# Patient Record
Sex: FEMALE | Race: WHITE | NOT HISPANIC OR LATINO | Employment: FULL TIME | URBAN - METROPOLITAN AREA
[De-identification: names, ages, dates, MRNs, and addresses within clinical notes are randomized per-mention and may not be internally consistent; named-entity substitution may affect disease eponyms.]

---

## 2017-10-31 ENCOUNTER — OFFICE VISIT (OUTPATIENT)
Dept: FAMILY MEDICINE | Facility: CLINIC | Age: 57
End: 2017-10-31
Payer: COMMERCIAL

## 2017-10-31 VITALS
HEIGHT: 67 IN | TEMPERATURE: 98.1 F | SYSTOLIC BLOOD PRESSURE: 104 MMHG | HEART RATE: 84 BPM | DIASTOLIC BLOOD PRESSURE: 70 MMHG | BODY MASS INDEX: 24.28 KG/M2 | OXYGEN SATURATION: 97 % | WEIGHT: 154.7 LBS

## 2017-10-31 DIAGNOSIS — R31.9 HEMATURIA, UNSPECIFIED TYPE: ICD-10-CM

## 2017-10-31 DIAGNOSIS — R39.9 SYMPTOMS INVOLVING URINARY SYSTEM: ICD-10-CM

## 2017-10-31 DIAGNOSIS — R10.9 FLANK PAIN: Primary | ICD-10-CM

## 2017-10-31 LAB
ALBUMIN UR-MCNC: NEGATIVE MG/DL
APPEARANCE UR: ABNORMAL
BACTERIA #/AREA URNS HPF: ABNORMAL /HPF
BILIRUB UR QL STRIP: NEGATIVE
COLOR UR AUTO: YELLOW
GLUCOSE UR STRIP-MCNC: NEGATIVE MG/DL
HGB UR QL STRIP: ABNORMAL
KETONES UR STRIP-MCNC: NEGATIVE MG/DL
LEUKOCYTE ESTERASE UR QL STRIP: NEGATIVE
NITRATE UR QL: NEGATIVE
NON-SQ EPI CELLS #/AREA URNS LPF: ABNORMAL /LPF
PH UR STRIP: 5.5 PH (ref 5–7)
RBC #/AREA URNS AUTO: ABNORMAL /HPF
SOURCE: ABNORMAL
SP GR UR STRIP: 1.02 (ref 1–1.03)
UROBILINOGEN UR STRIP-ACNC: 0.2 EU/DL (ref 0.2–1)
WBC #/AREA URNS AUTO: ABNORMAL /HPF

## 2017-10-31 PROCEDURE — 81001 URINALYSIS AUTO W/SCOPE: CPT | Performed by: PHYSICIAN ASSISTANT

## 2017-10-31 PROCEDURE — 99214 OFFICE O/P EST MOD 30 MIN: CPT | Performed by: PHYSICIAN ASSISTANT

## 2017-10-31 RX ORDER — TAMSULOSIN HYDROCHLORIDE 0.4 MG/1
0.4 CAPSULE ORAL DAILY
Qty: 30 CAPSULE | Refills: 0 | Status: SHIPPED | OUTPATIENT
Start: 2017-10-31 | End: 2018-09-05

## 2017-10-31 NOTE — NURSING NOTE
"Chief Complaint   Patient presents with     UTI       Initial /70 (BP Location: Right arm, Cuff Size: Adult Regular)  Pulse 84  Temp 98.1  F (36.7  C) (Oral)  Ht 5' 7\" (1.702 m)  Wt 154 lb 11.2 oz (70.2 kg)  SpO2 97%  BMI 24.23 kg/m2 Estimated body mass index is 24.23 kg/(m^2) as calculated from the following:    Height as of this encounter: 5' 7\" (1.702 m).    Weight as of this encounter: 154 lb 11.2 oz (70.2 kg).  Medication Reconciliation: complete   Aris Noel CMA      "

## 2017-10-31 NOTE — PROGRESS NOTES
SUBJECTIVE:   Joanne Padilla is a 57 year old female who presents to clinic today for the following health issues:    URINARY TRACT SYMPTOMS      Duration: 4 days    Description  frequency, urgency, back pain and kidney stone    Intensity:  moderate    Accompanying signs and symptoms:  Fever/chills: YES  Flank pain no   Nausea and vomiting: no   Vaginal symptoms: irritated   Abdominal/Pelvic Pain: no     History  History of frequent UTI's: no   History of kidney stones: YES  Sexually Active: no   Possibility of pregnancy: No    Precipitating or alleviating factors: None    Therapies tried and outcome: course of antibiotics - Zpack, increase fluid intake and ibuprofen   Outcome: ineffective         -Patient is a 58yo female with new onset urinary symptoms  -This follows a sinus infection treated with azithromycin  -On Friday she developed increased urinary frequency  -she denies any gross blood in the urine  -Some lower back pain, left sided, feeling worn down  -she denies any fever  -there is no vaginal irritation, or discharge  -not drinking enough fluids      Problem list and histories reviewed & adjusted, as indicated.  Additional history: as documented    Patient Active Problem List   Diagnosis     Tobacco abuse     Chronic rhinitis     Atopic dermatitis     Migraines     Past Surgical History:   Procedure Laterality Date      SECTION       COLONOSCOPY       DAVINCI LAPAROSCOPIC CHOLECYSTECTOMY WITHOUT GRAMS  2013    Procedure: DAVINCI LAPAROSCOPIC CHOLECYSTECTOMY WITHOUT GRAMS;  DAVINCI CHOLECYSTECTOMY;  Surgeon: Jac Stallings MD;  Location: [facility]     GYN SURGERY      repair of fallopian tubes and ovaries     LUMPECTOMY BREAST       LUMPECTOMY BREAST       NOSE SURGERY         Social History   Substance Use Topics     Smoking status: Current Every Day Smoker     Packs/day: 0.50     Years: 15.00     Types: Cigarettes     Smokeless tobacco: Never Used      Comment: Has not been smoking  "lately but has not quit, [place] quit plan -13      Alcohol use No     Family History   Problem Relation Age of Onset     C.A.D. Mother      MI early 50s;  59 heart problems     C.A.D. Father              Reviewed and updated as needed this visit by clinical staffTobacco  Allergies  Med Hx  Surg Hx  Fam Hx  Soc Hx      Reviewed and updated as needed this visit by Provider         ROS:  Constitutional, HEENT, cardiovascular, pulmonary, gi and gu systems are negative, except as otherwise noted.      OBJECTIVE:   /70 (BP Location: Right arm, Cuff Size: Adult Regular)  Pulse 84  Temp 98.1  F (36.7  C) (Oral)  Ht 5' 7\" (1.702 m)  Wt 154 lb 11.2 oz (70.2 kg)  SpO2 97%  BMI 24.23 kg/m2  Body mass index is 24.23 kg/(m^2).  GENERAL: healthy, alert and no distress  RESP: lungs clear to auscultation - no rales, rhonchi or wheezes  CV: regular rates and rhythm  ABDOMEN: soft, nontender, no hepatosplenomegaly, no masses and bowel sounds normal  BACK: mild CVA tenderness, no paralumbar tenderness    Diagnostic Test Results:  Results for orders placed or performed in visit on 10/31/17 (from the past 24 hour(s))   *UA reflex to Microscopic and Culture ([place] and [facility] (except [place] and [place])   Result Value Ref Range    Color Urine Yellow     Appearance Urine Slightly Cloudy     Glucose Urine Negative NEG^Negative mg/dL    Bilirubin Urine Negative NEG^Negative    Ketones Urine Negative NEG^Negative mg/dL    Specific Gravity Urine 1.020 1.003 - 1.035    Blood Urine Moderate (A) NEG^Negative    pH Urine 5.5 5.0 - 7.0 pH    Protein Albumin Urine Negative NEG^Negative mg/dL    Urobilinogen Urine 0.2 0.2 - 1.0 EU/dL    Nitrite Urine Negative NEG^Negative    Leukocyte Esterase Urine Negative NEG^Negative    Source Midstream Urine    Urine Microscopic   Result Value Ref Range    WBC Urine O - 2 OTO2^O - 2 /HPF    RBC Urine 10-25 (A) OTO2^O - 2 /HPF    Squamous Epithelial /LPF Urine Moderate (A) " FEW^Few /LPF    Bacteria Urine Many (A) NEG^Negative /HPF       ASSESSMENT/PLAN:   1. Flank pain  2. Hematuria, unspecified type  3. Symptoms involving urinary system  With hx of stone and urine today, along with exam, suspect stone. She admits that she does not drink much in the way of fluids. We did review other possible etiologies to her symptoms and she'll monitor for these. She is otherwise stable. We'll have her start flomax, push fluids and begin straining her urine. If symptoms worsen or dont improve over the next few days we've arranged for CT scan to further investigate.   - tamsulosin (FLOMAX) 0.4 MG capsule; Take 1 capsule (0.4 mg) by mouth daily  Dispense: 30 capsule; Refill: 0  - CT Abdomen Pelvis w/o & w Contrast; Future  - Stone analysis; Future  - *UA reflex to Microscopic and Culture ([place] and [facility] (except [place] and [place])  - Urine Microscopic      Fermín Larios PA-C  [facility]

## 2017-10-31 NOTE — MR AVS SNAPSHOT
"              After Visit Summary   10/31/2017    Joanne Padilla    MRN: [id number]           Patient Information     Date Of Birth          1960        Visit Information        Provider Department      10/31/2017 6:20 PM Fermín Larios PA-C [facility]        Today's Diagnoses     Flank pain    -  1    Hematuria, unspecified type        Symptoms involving urinary system           Follow-ups after your visit        Future tests that were ordered for you today     Open Future Orders        Priority Expected Expires Ordered    CT Abdomen Pelvis w/o & w Contrast Routine  10/31/2018 10/31/2017    Stone analysis Routine  10/31/2018 10/31/2017            Who to contact     If you have questions or need follow up information about today's clinic visit or your schedule please contact [facility] directly at [phone].  Normal or non-critical lab and imaging results will be communicated to you by MyChart, letter or phone within 4 business days after the clinic has received the results. If you do not hear from us within 7 days, please contact the clinic through MyChart or phone. If you have a critical or abnormal lab result, we will notify you by phone as soon as possible.  Submit refill requests through [employer] or call your pharmacy and they will forward the refill request to us. Please allow 3 business days for your refill to be completed.          Additional Information About Your Visit        MyChart Information     [employer] lets you send messages to your doctor, view your test results, renew your prescriptions, schedule appointments and more. To sign up, go to [email or web address] . Click on \"Log in\" on the left side of the screen, which will take you to the Welcome page. Then click on \"Sign up Now\" on the right side of the page.     You will be asked to enter the access code listed below, as well as some personal information. Please follow the directions to create " "your username and password.     Your access code is: [email or web address]  Expires: 2018  7:07 PM     Your access code will  in 90 days. If you need help or a new code, please call your [place] clinic or [phone].        Care EveryWhere ID     This is your Care EveryWhere ID. This could be used by other organizations to access your [place] medical records  [id number]        Your Vitals Were     Pulse Temperature Height Pulse Oximetry BMI (Body Mass Index)       84 98.1  F (36.7  C) (Oral) 5' 7\" (1.702 m) 97% 24.23 kg/m2        Blood Pressure from Last 3 Encounters:   10/31/17 104/70   13 104/62   13 100/60    Weight from Last 3 Encounters:   10/31/17 154 lb 11.2 oz (70.2 kg)   13 164 lb 7.4 oz (74.6 kg)   13 165 lb 9.1 oz (75.1 kg)              We Performed the Following     *UA reflex to Microscopic and Culture ([place] and [facility] (except [place] and [place])     Urine Microscopic          Today's Medication Changes          These changes are accurate as of: 10/31/17  7:07 PM.  If you have any questions, ask your nurse or doctor.               Start taking these medicines.        Dose/Directions    tamsulosin 0.4 MG capsule   Commonly known as:  FLOMAX   Used for:  Flank pain, Hematuria, unspecified type   Started by:  Fermín Larios PA-C        Dose:  0.4 mg   Take 1 capsule (0.4 mg) by mouth daily   Quantity:  30 capsule   Refills:  0            Where to get your medicines      [facility] [address] [facility] [address] [address] [facility] [address] [facility]  [address] [address] [facility] [address]     Phone:  [phone]     tamsulosin 0.4 MG capsule                Primary Care Provider Office Phone # Fax #    Lance Alcocer [phone]       [facility] [address]  [address]  [facility] [address]        Equal Access to Services     KHADIJAH DIAZ AH: Veena Marcus, " wahalleilani cazaresmarcialha, qaybta kanirmal marsh, isaac hernandezdhruv ah. So [facility] [phone].    ATENCIÓN: Si darrius hagan, tiene a gonzales disposición servicios gratuitos de asistencia lingüística. Modeame al [phone].    We comply with applicable federal civil rights laws and [place] laws. We do not discriminate on the basis of race, color, national origin, age, disability, sex, sexual orientation, or gender identity.            Thank you!     Thank you for choosing [facility]  for your care. Our goal is always to provide you with excellent care. Hearing back from our patients is one way we can continue to improve our services. Please take a few minutes to complete the written survey that you may receive in the mail after your visit with us. Thank you!             Your Updated Medication List - Protect others around you: Learn how to safely use, store and throw away your medicines at www.disposemymeds.org.          This list is accurate as of: 10/31/17  7:07 PM.  Always use your most recent med list.                   Brand Name Dispense Instructions for use Diagnosis    ORTHO TRI-CYCLEN LO PO      Take 1 tablet by mouth daily        tamsulosin 0.4 MG capsule    FLOMAX    30 capsule    Take 1 capsule (0.4 mg) by mouth daily    Flank pain, Hematuria, unspecified type       vitamin D 1000 UNITS capsule      Take 1 capsule by mouth daily

## 2018-01-04 ENCOUNTER — TELEPHONE (OUTPATIENT)
Dept: FAMILY MEDICINE | Facility: CLINIC | Age: 58
End: 2018-01-04

## 2018-01-04 NOTE — TELEPHONE ENCOUNTER
Panel Management Review      Patient has the following on her problem list: None      Composite cancer screening  Chart review shows that this patient is due/due soon for the following Pap Smear, Mammogram and Colonoscopy  Summary:    Patient is due/failing the following:   COLONOSCOPY, MAMMOGRAM, PAP and PHYSICAL    Action needed:   Patient needs office visit for physical and pap. Also due for a mammogram and a colonoscopy.    Type of outreach:    Phone, spoke to patient.  Patient stated that she would like to establish care here at [place], and will call back to schedule her physical.     Questions for provider review:    None                                                                                                                                    Cari Dias MA     Chart Closed.

## 2018-05-07 ENCOUNTER — TELEPHONE (OUTPATIENT)
Dept: FAMILY MEDICINE | Facility: CLINIC | Age: 58
End: 2018-05-07

## 2018-05-07 NOTE — TELEPHONE ENCOUNTER
Type of outreach:  Phone, spoke to patient.  Patient stated that she will try to get a physical scheduled.   Health Maintenance Due   Topic Date Due     PAP Q1 YR  06/20/1975     HIV SCREEN (SYSTEM ASSIGNED)  06/20/1980     HEPATITIS C SCREENING  06/20/1980     LIPID SCREEN Q5 YR FEMALE (SYSTEM ASSIGNED)  06/20/2007     MAMMO SCREEN Q2 YR (SYSTEM ASSIGNED)  06/20/2012     COLON CANCER SCREEN (SYSTEM ASSIGNED)  06/20/2012     TETANUS IMMUNIZATION (SYSTEM ASSIGNED)  01/01/2017     ADVANCE DIRECTIVE PLANNING Q5 YRS  06/20/2017     Due for physical and pap, also due for colon screening and mammogram.   Cari Dias MA

## 2018-09-05 ENCOUNTER — OFFICE VISIT (OUTPATIENT)
Dept: FAMILY MEDICINE | Facility: CLINIC | Age: 58
End: 2018-09-05
Payer: COMMERCIAL

## 2018-09-05 VITALS
HEART RATE: 87 BPM | SYSTOLIC BLOOD PRESSURE: 100 MMHG | RESPIRATION RATE: 17 BRPM | OXYGEN SATURATION: 99 % | WEIGHT: 171.1 LBS | TEMPERATURE: 99 F | DIASTOLIC BLOOD PRESSURE: 72 MMHG | BODY MASS INDEX: 26.8 KG/M2

## 2018-09-05 DIAGNOSIS — Z83.49 FAMILY HISTORY OF THYROID DISEASE: ICD-10-CM

## 2018-09-05 DIAGNOSIS — F43.9 STRESS: ICD-10-CM

## 2018-09-05 DIAGNOSIS — L30.9 DERMATITIS: Primary | ICD-10-CM

## 2018-09-05 PROCEDURE — 99214 OFFICE O/P EST MOD 30 MIN: CPT | Performed by: INTERNAL MEDICINE

## 2018-09-05 RX ORDER — DEXTROAMPHETAMINE SACCHARATE, AMPHETAMINE ASPARTATE, DEXTROAMPHETAMINE SULFATE AND AMPHETAMINE SULFATE 5; 5; 5; 5 MG/1; MG/1; MG/1; MG/1
20 TABLET ORAL DAILY
COMMUNITY
Start: 2018-08-29 | End: 2019-04-12

## 2018-09-05 RX ORDER — NORGESTIMATE AND ETHINYL ESTRADIOL 7DAYSX3 28
1 KIT ORAL DAILY
COMMUNITY
Start: 2018-05-16 | End: 2019-01-31

## 2018-09-05 RX ORDER — DOXYCYCLINE 100 MG/1
100 TABLET ORAL 2 TIMES DAILY
Qty: 28 TABLET | Refills: 0 | Status: SHIPPED | OUTPATIENT
Start: 2018-09-05 | End: 2019-01-31

## 2018-09-05 RX ORDER — PERMETHRIN 50 MG/G
CREAM TOPICAL
Qty: 60 G | Refills: 1 | Status: SHIPPED | OUTPATIENT
Start: 2018-09-05 | End: 2019-01-31

## 2018-09-05 RX ORDER — DEXTROAMPHETAMINE SACCHARATE, AMPHETAMINE ASPARTATE MONOHYDRATE, DEXTROAMPHETAMINE SULFATE AND AMPHETAMINE SULFATE 5; 5; 5; 5 MG/1; MG/1; MG/1; MG/1
20 CAPSULE, EXTENDED RELEASE ORAL DAILY
COMMUNITY
Start: 2018-07-06 | End: 2019-01-31

## 2018-09-05 NOTE — MR AVS SNAPSHOT
"              After Visit Summary   9/5/2018    Joanne Padilla    MRN: [id number]           Patient Information     Date Of Birth          1960        Visit Information        Provider Department      9/5/2018 10:00 AM Jody Trujillo MD [facility]        Today's Diagnoses     Dermatitis    -  1    Family history of thyroid disease        Stress           Follow-ups after your visit        Future tests that were ordered for you today     Open Future Orders        Priority Expected Expires Ordered    CBC with platelets differential Routine  10/5/2018 9/5/2018    TSH with free T4 reflex Routine  10/5/2018 9/5/2018    Comprehensive metabolic panel Routine  10/5/2018 9/5/2018    Cortisol Routine  10/5/2018 9/5/2018            Who to contact     If you have questions or need follow up information about today's clinic visit or your schedule please contact [facility] directly at [phone].  Normal or non-critical lab and imaging results will be communicated to you by MyChart, letter or phone within 4 business days after the clinic has received the results. If you do not hear from us within 7 days, please contact the clinic through [employer] or phone. If you have a critical or abnormal lab result, we will notify you by phone as soon as possible.  Submit refill requests through [employer] or call your pharmacy and they will forward the refill request to us. Please allow 3 business days for your refill to be completed.          Additional Information About Your Visit        MyChart Information     [employer] lets you send messages to your doctor, view your test results, renew your prescriptions, schedule appointments and more. To sign up, go to [email or web address] . Click on \"Log in\" on the left side of the screen, which will take you to the Welcome page. Then click on \"Sign up Now\" on the right side of the page.     You will be asked to enter the access code listed below, as " well as some personal information. Please follow the directions to create your username and password.     Your access code is: [email or web address]  Expires: 2018 10:47 AM     Your access code will  in 90 days. If you need help or a new code, please call your [place] clinic or [phone].        Care EveryWhere ID     This is your Care EveryWhere ID. This could be used by other organizations to access your [place] medical records  [id number]        Your Vitals Were     Pulse Temperature Respirations Last Period Pulse Oximetry BMI (Body Mass Index)    87 99  F (37.2  C) (Oral) 17 2018 (Exact Date) 99% 26.8 kg/m2       Blood Pressure from Last 3 Encounters:   18 100/72   10/31/17 104/70   13 104/62    Weight from Last 3 Encounters:   18 171 lb 1.6 oz (77.6 kg)   10/31/17 154 lb 11.2 oz (70.2 kg)   13 164 lb 7.4 oz (74.6 kg)                 Today's Medication Changes          These changes are accurate as of 18 10:47 AM.  If you have any questions, ask your nurse or doctor.               Start taking these medicines.        Dose/Directions    doxycycline Monohydrate 100 MG Tabs   Used for:  Dermatitis   Started by:  Jody Trujillo MD        Dose:  100 mg   Take 100 mg by mouth 2 times daily for 14 days   Quantity:  28 tablet   Refills:  0       permethrin 5 % cream   Commonly known as:  ELIMITE   Used for:  Dermatitis   Started by:  Jody Trujillo MD        Apply cream from head to toe (except the face); leave on for 8-14 hours then wash off with water; reapply in 1 week if live mites appear.   Quantity:  60 g   Refills:  1            Where to get your medicines      These medications were [facility] to [facility] [address]  [place] [address] [address] [address] [facility] [address] [facility]  [address] [place] [address]     Phone:  [phone]     doxycycline Monohydrate 100 MG Tabs    permethrin 5 % cream                Primary Care Provider  Office Phone # Fax #    Fermín Larios PA-C [phone]       [address] [address] [address]        Equal Access to Services     KHADIJAH DIAZ : Hadii peace ku hadambero Somangoali, waaxda luqadaha, qaybta kaalmada adeegyada, isaac bolaños laDianneverónica rucker. So [facility] [phone].    ATENCIÓN: Si habla español, tiene a gonzales disposición servicios gratuitos de asistencia lingüística. Llame al [phone].    We comply with applicable federal civil rights laws and [place] laws. We do not discriminate on the basis of race, color, national origin, age, disability, sex, sexual orientation, or gender identity.            Thank you!     Thank you for choosing [facility]  for your care. Our goal is always to provide you with excellent care. Hearing back from our patients is one way we can continue to improve our services. Please take a few minutes to complete the written survey that you may receive in the mail after your visit with us. Thank you!             Your Updated Medication List - Protect others around you: Learn how to safely use, store and throw away your medicines at www.disposemymeds.org.          This list is accurate as of 9/5/18 10:47 AM.  Always use your most recent med list.                   Brand Name Dispense Instructions for use Diagnosis    * ADDERALL XR 20 MG per 24 hr capsule   Generic drug:  amphetamine-dextroamphetamine      Take 20 mg by mouth daily        * amphetamine-dextroamphetamine 20 MG per tablet    ADDERALL     Take 20 mg by mouth daily        doxycycline Monohydrate 100 MG Tabs     28 tablet    Take 100 mg by mouth 2 times daily for 14 days    Dermatitis       permethrin 5 % cream    ELIMITE    60 g    Apply cream from head to toe (except the face); leave on for 8-14 hours then wash off with water; reapply in 1 week if live mites appear.    Dermatitis       TRINESSA (28) 0.18/0.215/0.25 MG-35 MCG per tablet   Generic drug:  norgestim-eth  estrad triphasic      Take 1 tablet by mouth daily        * Notice:  This list has 2 medication(s) that are the same as other medications prescribed for you. Read the directions carefully, and ask your doctor or other care provider to review them with you.

## 2018-09-05 NOTE — PROGRESS NOTES
SUBJECTIVE:   Joanne Padilla is a 58 year old female who presents to clinic today for the following health issues:      Rash, bumps and itching    Patient presents with:  Derm Problem: itching and has small sores all over her ankles and on other areas of her body.   itching is all over her body and gets small lumps that she scratches.   symptoms since            Duration: has been ongoing since     Description  Location: all over her body   Sores on her ankles and does scratch areas  Itching: severe    Intensity:  moderate    Accompanying signs and symptoms: as noted    History (similar episodes/previous evaluation): intermittent over several years ago, but this is much worse than she has ever experienced.     Precipitating or alleviating factors:  New exposures:  None  Recent travel: no      Therapies tried and outcome: hydrocortisone cream -  not effective and calamine lotion      Problem list and histories reviewed & adjusted, as indicated.  Additional history: as documented    Patient Active Problem List   Diagnosis     Tobacco abuse     Chronic rhinitis     Atopic dermatitis     Migraines     Past Surgical History:   Procedure Laterality Date      SECTION       COLONOSCOPY       DAVINCI LAPAROSCOPIC CHOLECYSTECTOMY WITHOUT GRAMS  2013    Procedure: DAVINCI LAPAROSCOPIC CHOLECYSTECTOMY WITHOUT GRAMS;  DAVINCI CHOLECYSTECTOMY;  Surgeon: Jac Stallings MD;  Location: [facility]     GYN SURGERY      repair of fallopian tubes and ovaries     LUMPECTOMY BREAST       LUMPECTOMY BREAST       NOSE SURGERY         Social History   Substance Use Topics     Smoking status: Current Every Day Smoker     Packs/day: 0.50     Years: 15.00     Types: Cigarettes     Smokeless tobacco: Never Used      Comment: Has not been smoking lately but has not quit, [place] -13      Alcohol use No     Family History   Problem Relation Age of Onset     C.A.D. Mother      MI early 50s;  59 heart problems      C.A.D. Father          Current Outpatient Prescriptions   Medication Sig Dispense Refill     amphetamine-dextroamphetamine (ADDERALL XR) 20 MG per 24 hr capsule Take 20 mg by mouth daily       amphetamine-dextroamphetamine (ADDERALL) 20 MG per tablet Take 20 mg by mouth daily       norgestim-eth estrad triphasic (TRINESSA, 28,) 0.18/0.215/0.25 MG-35 MCG per tablet Take 1 tablet by mouth daily       permethrin (ELIMITE) 5 % cream Apply cream from head to toe (except the face); leave on for 8-14 hours then wash off with water; reapply in 1 week if live mites appear. 60 g 1     Allergies   Allergen Reactions     Codeine Nausea     Latex      BP Readings from Last 3 Encounters:   09/05/18 100/72   10/31/17 104/70   09/24/13 104/62    Wt Readings from Last 3 Encounters:   09/05/18 171 lb 1.6 oz (77.6 kg)   10/31/17 154 lb 11.2 oz (70.2 kg)   09/24/13 164 lb 7.4 oz (74.6 kg)                  Labs reviewed in EPIC    Reviewed and updated as needed this visit by clinical staff  Tobacco  Allergies  Meds  Problems  Med Hx  Surg Hx  Fam Hx  Soc Hx        Reviewed and updated as needed this visit by Provider  Allergies  Meds  Problems         ROS:  CONSTITUTIONAL: NEGATIVE for fever, chills, change in weight  INTEGUMENTARY/SKIN: see above  RESP: NEGATIVE for significant cough or SOB  CV: NEGATIVE for chest pain, palpitations or peripheral edema  GI: NEGATIVE for nausea, abdominal pain, heartburn, or change in bowel habits  ENDOCRINE: family hx of thyroid disease  HEME/ALLERGY/IMMUNE: no hx of anemia or immune changes.  PSYCHIATRIC: NEGATIVE for changes in mood or affect    OBJECTIVE:     /72  Pulse 87  Temp 99  F (37.2  C) (Oral)  Resp 17  Wt 171 lb 1.6 oz (77.6 kg)  LMP 08/26/2018 (Exact Date)  SpO2 99%  BMI 26.8 kg/m2  Body mass index is 26.8 kg/(m^2).  GENERAL: healthy, alert and no distress  NECK: no adenopathy, no asymmetry, masses, or scars and thyroid normal to palpation  RESP: lungs clear to  auscultation - no rales, rhonchi or wheezes  CV: regular rates and rhythm, normal S1 S2, no S3 or S4, peripheral pulses strong and no peripheral edema  ABDOMEN: soft, nontender, no hepatosplenomegaly, no masses and bowel sounds normal  MS: no gross musculoskeletal defects noted, no edema  SKIN: currently, lower extremities with excoriations and secondary infection- around ankles an dlower extremities; ; also few on her trunk;  None in groin;  Hands/webs of fingers irritated dry skin without open areas.  NEURO: Normal strength and tone, mentation intact and speech normal  PSYCH: mentation appears normal, affect normal/bright      ASSESSMENT/PLAN:     (L30.9) Dermatitis  (primary encounter diagnosis)  Comment: possible scabies; no clear exposure; some with secondary infection.  Check with lab; unable to assess for scabies ( do not have necessary oil)  recommend antibiotics to treat secondary infection and topical for probable scabies; benefits outweigh risk.  Discussed course of treatment; she understands to application process for medications and to repeat in one week.   Plan: CBC with platelets differential, Comprehensive         metabolic panel, permethrin (ELIMITE) 5 %         cream, doxycycline Monohydrate 100 MG TABS          (Z83.49) Family history of thyroid disease  Comment: FH reviewed; pt is concerns about dry, itchy skin; she is concerned about thyroid.   Plan: TSH with free T4 reflex         (F43.9) Stress  Comment: increased stress and anxiety; reviewed pt request for labs.   Plan: Comprehensive metabolic panel, Cortisol            Jody Trujillo MD  Internal Medicine   [facility]

## 2018-09-06 DIAGNOSIS — Z83.49 FAMILY HISTORY OF THYROID DISEASE: ICD-10-CM

## 2018-09-06 DIAGNOSIS — F43.9 STRESS: ICD-10-CM

## 2018-09-06 DIAGNOSIS — L30.9 DERMATITIS: ICD-10-CM

## 2018-09-06 LAB
BASOPHILS # BLD AUTO: 0 10E9/L (ref 0–0.2)
BASOPHILS NFR BLD AUTO: 0.4 %
CORTIS SERPL-MCNC: 13.6 UG/DL (ref 4–22)
DIFFERENTIAL METHOD BLD: NORMAL
EOSINOPHIL # BLD AUTO: 0.1 10E9/L (ref 0–0.7)
EOSINOPHIL NFR BLD AUTO: 1.3 %
ERYTHROCYTE [DISTWIDTH] IN BLOOD BY AUTOMATED COUNT: 13.7 % (ref 10–15)
HCT VFR BLD AUTO: 41.1 % (ref 35–47)
HGB BLD-MCNC: 13.5 G/DL (ref 11.7–15.7)
LYMPHOCYTES # BLD AUTO: 2.2 10E9/L (ref 0.8–5.3)
LYMPHOCYTES NFR BLD AUTO: 29.3 %
MCH RBC QN AUTO: 31.2 PG (ref 26.5–33)
MCHC RBC AUTO-ENTMCNC: 32.8 G/DL (ref 31.5–36.5)
MCV RBC AUTO: 95 FL (ref 78–100)
MONOCYTES # BLD AUTO: 0.4 10E9/L (ref 0–1.3)
MONOCYTES NFR BLD AUTO: 4.8 %
NEUTROPHILS # BLD AUTO: 4.8 10E9/L (ref 1.6–8.3)
NEUTROPHILS NFR BLD AUTO: 64.2 %
PLATELET # BLD AUTO: 344 10E9/L (ref 150–450)
RBC # BLD AUTO: 4.33 10E12/L (ref 3.8–5.2)
WBC # BLD AUTO: 7.5 10E9/L (ref 4–11)

## 2018-09-06 PROCEDURE — 84443 ASSAY THYROID STIM HORMONE: CPT | Performed by: INTERNAL MEDICINE

## 2018-09-06 PROCEDURE — 80053 COMPREHEN METABOLIC PANEL: CPT | Performed by: INTERNAL MEDICINE

## 2018-09-06 PROCEDURE — 85025 COMPLETE CBC W/AUTO DIFF WBC: CPT | Performed by: INTERNAL MEDICINE

## 2018-09-06 PROCEDURE — 36415 COLL VENOUS BLD VENIPUNCTURE: CPT | Performed by: INTERNAL MEDICINE

## 2018-09-06 PROCEDURE — 82533 TOTAL CORTISOL: CPT | Performed by: INTERNAL MEDICINE

## 2018-09-07 ENCOUNTER — TELEPHONE (OUTPATIENT)
Dept: FAMILY MEDICINE | Facility: CLINIC | Age: 58
End: 2018-09-07

## 2018-09-07 LAB
ALBUMIN SERPL-MCNC: 3.3 G/DL (ref 3.4–5)
ALP SERPL-CCNC: 58 U/L (ref 40–150)
ALT SERPL W P-5'-P-CCNC: 16 U/L (ref 0–50)
ANION GAP SERPL CALCULATED.3IONS-SCNC: 9 MMOL/L (ref 3–14)
AST SERPL W P-5'-P-CCNC: 12 U/L (ref 0–45)
BILIRUB SERPL-MCNC: 0.3 MG/DL (ref 0.2–1.3)
BUN SERPL-MCNC: 17 MG/DL (ref 7–30)
CALCIUM SERPL-MCNC: 8.1 MG/DL (ref 8.5–10.1)
CHLORIDE SERPL-SCNC: 108 MMOL/L (ref 94–109)
CO2 SERPL-SCNC: 23 MMOL/L (ref 20–32)
CREAT SERPL-MCNC: 0.77 MG/DL (ref 0.52–1.04)
GFR SERPL CREATININE-BSD FRML MDRD: 77 ML/MIN/1.7M2
GLUCOSE SERPL-MCNC: 105 MG/DL (ref 70–99)
POTASSIUM SERPL-SCNC: 4.4 MMOL/L (ref 3.4–5.3)
PROT SERPL-MCNC: 6.9 G/DL (ref 6.8–8.8)
SODIUM SERPL-SCNC: 140 MMOL/L (ref 133–144)
TSH SERPL DL<=0.005 MIU/L-ACNC: 2 MU/L (ref 0.4–4)

## 2018-09-07 NOTE — TELEPHONE ENCOUNTER
Type of outreach:  None  Health Maintenance Due   Topic Date Due     PHQ-2 Q1 YR  06/20/1974     PAP Q1 YR  06/20/1975     HIV SCREEN (SYSTEM ASSIGNED)  06/20/1980     HEPATITIS C SCREENING  06/20/1980     LIPID SCREEN Q5 YR FEMALE (SYSTEM ASSIGNED)  06/20/2007     MAMMO SCREEN Q2 YR (SYSTEM ASSIGNED)  06/20/2012     COLON CANCER SCREEN (SYSTEM ASSIGNED)  06/20/2012     TETANUS IMMUNIZATION (SYSTEM ASSIGNED)  01/01/2017     ADVANCE DIRECTIVE PLANNING Q5 YRS  06/20/2017     INFLUENZA VACCINE (1) 09/01/2018     Patient seen on 9/5/18 and  clinic, Health Maintenance should have been discussed at this visit.   Cari Dias MA

## 2018-09-08 ENCOUNTER — HEALTH MAINTENANCE LETTER (OUTPATIENT)
Age: 58
End: 2018-09-08

## 2018-10-19 ENCOUNTER — ALLIED HEALTH/NURSE VISIT (OUTPATIENT)
Dept: NURSING | Facility: CLINIC | Age: 58
End: 2018-10-19
Payer: COMMERCIAL

## 2018-10-19 DIAGNOSIS — Z23 NEED FOR PROPHYLACTIC VACCINATION AND INOCULATION AGAINST INFLUENZA: Primary | ICD-10-CM

## 2018-10-19 PROCEDURE — 90682 RIV4 VACC RECOMBINANT DNA IM: CPT

## 2018-10-19 PROCEDURE — 90471 IMMUNIZATION ADMIN: CPT

## 2018-10-19 PROCEDURE — 99207 ZZC NO CHARGE NURSE ONLY: CPT

## 2018-10-19 NOTE — MR AVS SNAPSHOT
After Visit Summary   10/19/2018    Joanne Padilla    MRN: [id number]           Patient Information     Date Of Birth          1960        Visit Information        Provider Department      10/19/2018 2:30 PM  NURSE Mai [facility]        Today's Diagnoses     Need for prophylactic vaccination and inoculation against influenza    -  1       Follow-ups after your visit        Who to contact     If you have questions or need follow up information about today's clinic visit or your schedule please contact [facility] directly at [phone].  Normal or non-critical lab and imaging results will be communicated to you by [employer], letter or phone within 4 business days after the clinic has received the results. If you do not hear from us within 7 days, please contact the clinic through [facility] or phone. If you have a critical or abnormal lab result, we will notify you by phone as soon as possible.  Submit refill requests through Office Center or call your pharmacy and they will forward the refill request to us. Please allow 3 business days for your refill to be completed.          Additional Information About Your Visit        MyChart Information     Office Center gives you secure access to your electronic health record. If you see a primary care provider, you can also send messages to your care team and make appointments. If you have questions, please call your primary care clinic.  If you do not have a primary care provider, please call [phone] and they will assist you.        Care EveryWhere ID     This is your Care EveryWhere ID. This could be used by other organizations to access your [facility] medical records  [id number]         Blood Pressure from Last 3 Encounters:   09/05/18 100/72   10/31/17 104/70   09/24/13 104/62    Weight from Last 3 Encounters:   09/05/18 171 lb 1.6 oz (77.6 kg)   10/31/17 154 lb 11.2 oz (70.2 kg)   09/24/13 164 lb 7.4 oz (74.6 kg)               We Performed the Following     FLU VACCINE, (RIV4) RECOMBINANT HA  , IM (FluBlok, egg free) [28185]- >18 YRS (FMG recommended  50-64 YRS)     Vaccine Administration, Initial [15824]        Primary Care Provider Office Phone # Fax #    Fermín Larios PA-C [phone] [address] [facility] [address]        Equal Access to Services     [facility] JOE : Hadii aad ku hadasho Soomaali, waaxda luqadaha, qaybta kaalmada adeegyada, waxay idiin hayaan adeeg khmichelesh la'pattin . So [facility] [phone].    ATENCIÓN: Si habla espandrey, tiene a gonzales disposición servicios gratuitos de asistencia lingüística. Llame al [phone].    We comply with applicable federal civil rights laws and [place] laws. We do not discriminate on the basis of race, color, national origin, age, disability, sex, sexual orientation, or gender identity.            Thank you!     Thank you for choosing [facility]  for your care. Our goal is always to provide you with excellent care. Hearing back from our patients is one way we can continue to improve our services. Please take a few minutes to complete the written survey that you may receive in the mail after your visit with us. Thank you!             Your Updated Medication List - Protect others around you: Learn how to safely use, store and throw away your medicines at www.disposemymeds.org.          This list is accurate as of 10/19/18  3:12 PM.  Always use your most recent med list.                   Brand Name Dispense Instructions for use Diagnosis    * ADDERALL XR 20 MG per 24 hr capsule   Generic drug:  amphetamine-dextroamphetamine      Take 20 mg by mouth daily        * amphetamine-dextroamphetamine 20 MG per tablet    ADDERALL     Take 20 mg by mouth daily        permethrin 5 % cream    ELIMITE    60 g    Apply cream from head to toe (except the face); leave on for 8-14 hours then wash off with water; reapply in 1 week if live mites appear.     Dermatitis       TRINESSA (28) 0.18/0.215/0.25 MG-35 MCG per tablet   Generic drug:  norgestim-eth estrad triphasic      Take 1 tablet by mouth daily        * Notice:  This list has 2 medication(s) that are the same as other medications prescribed for you. Read the directions carefully, and ask your doctor or other care provider to review them with you.

## 2018-12-13 ENCOUNTER — OFFICE VISIT (OUTPATIENT)
Dept: FAMILY MEDICINE | Facility: CLINIC | Age: 58
End: 2018-12-13
Payer: COMMERCIAL

## 2018-12-13 VITALS
BODY MASS INDEX: 26.72 KG/M2 | WEIGHT: 176.3 LBS | HEART RATE: 96 BPM | HEIGHT: 68 IN | OXYGEN SATURATION: 100 % | SYSTOLIC BLOOD PRESSURE: 102 MMHG | RESPIRATION RATE: 20 BRPM | DIASTOLIC BLOOD PRESSURE: 62 MMHG | TEMPERATURE: 97.9 F

## 2018-12-13 DIAGNOSIS — Z12.11 SPECIAL SCREENING FOR MALIGNANT NEOPLASMS, COLON: ICD-10-CM

## 2018-12-13 DIAGNOSIS — Z12.31 ENCOUNTER FOR SCREENING MAMMOGRAM FOR BREAST CANCER: ICD-10-CM

## 2018-12-13 DIAGNOSIS — J32.9 RHINOSINUSITIS: Primary | ICD-10-CM

## 2018-12-13 PROCEDURE — 99213 OFFICE O/P EST LOW 20 MIN: CPT | Performed by: PHYSICIAN ASSISTANT

## 2018-12-13 RX ORDER — ALBUTEROL SULFATE 90 UG/1
2 AEROSOL, METERED RESPIRATORY (INHALATION)
COMMUNITY
Start: 2018-11-19 | End: 2019-04-12

## 2018-12-13 RX ORDER — ESTRADIOL 1 MG/1
TABLET ORAL
Refills: 1 | COMMUNITY
Start: 2018-10-24 | End: 2019-01-31

## 2018-12-13 RX ORDER — ESTRADIOL 1 MG/1
1 TABLET ORAL
COMMUNITY
Start: 2018-10-22 | End: 2019-01-31

## 2018-12-13 RX ORDER — LANOLIN ALCOHOL/MO/W.PET/CERES
1000 CREAM (GRAM) TOPICAL
COMMUNITY
Start: 2016-08-01 | End: 2020-08-05

## 2018-12-13 ASSESSMENT — MIFFLIN-ST. JEOR: SCORE: 1438.19

## 2018-12-13 NOTE — PROGRESS NOTES
SUBJECTIVE:   Joanne Padilla is a 58 year old female who presents to clinic today for the following health issues:    RESPIRATORY SYMPTOMS      Duration: 1 month worse this past week    Description  nasal congestion, rhinorrhea, sore throat, facial pain/pressure, ear pain bilateral, headache and hoarse voice    Severity: moderate    Accompanying signs and symptoms: None    History (predisposing factors):  none    Precipitating or alleviating factors: None    Therapies tried and outcome:  none    -Patient is a 59yo female with a hx of upper respiratory symptoms for the past month  -She does have hx of allergies that started the symptoms but they continued to get worse  -Within the past week her symptoms have worsened; discharge has gotten thicker and yellow  -Feeling sluggish  -there is frontal and maxillary pain; equilibrium feels off  -snot feels stuck  -no temps checked, but no specific fevers  -has not taken over the counter meds    -in the past she has used flonase but got a lot of nasal bleeding  -using humidifier regularly     Problem list and histories reviewed & adjusted, as indicated.  Additional history: as documented    Patient Active Problem List   Diagnosis     Tobacco abuse     Chronic rhinitis     Atopic dermatitis     Migraines     Past Surgical History:   Procedure Laterality Date      SECTION       COLONOSCOPY       DAVINCI LAPAROSCOPIC CHOLECYSTECTOMY WITHOUT GRAMS  2013    Procedure: DAVINCI LAPAROSCOPIC CHOLECYSTECTOMY WITHOUT GRAMS;  DAVINCI CHOLECYSTECTOMY;  Surgeon: Jac Stallings MD;  Location: [facility] OR     GYN SURGERY      repair of fallopian tubes and ovaries     LUMPECTOMY BREAST       LUMPECTOMY BREAST       NOSE SURGERY         Social History     Tobacco Use     Smoking status: Current Every Day Smoker     Packs/day: 0.50     Years: 15.00     Pack years: 7.50     Types: Cigarettes     Smokeless tobacco: Never Used     Tobacco comment: Has not been smoking lately but  "has not quit, [place] -13    Substance Use Topics     Alcohol use: No     Family History   Problem Relation Age of Onset     C.A.D. Mother         MI early 50s;  59 heart problems     C.A.D. Father            Reviewed and updated as needed this visit by clinical staff  Tobacco  Allergies  Meds  Med Hx  Surg Hx  Fam Hx  Soc Hx      Reviewed and updated as needed this visit by Provider         ROS:  Constitutional, HEENT, cardiovascular, pulmonary, gi and gu systems are negative, except as otherwise noted.    OBJECTIVE:     /62   Pulse 96   Temp 97.9  F (36.6  C) (Oral)   Resp 20   Ht 1.727 m (5' 8\")   Wt 80 kg (176 lb 4.8 oz)   SpO2 100%   BMI 26.81 kg/m    Body mass index is 26.81 kg/m .  GENERAL: healthy, alert and no distress  EYES: Eyes grossly normal to inspection, PERRL and conjunctivae and sclerae normal  HENT: normal cephalic/atraumatic, right ear: clear effusion, left ear: clear effusion, nasal mucosa edematous , oropharynx clear and sinuses: maxillary, frontal tenderness on bilateral  RESP: lungs clear to auscultation - no rales, rhonchi or wheezes  CV: regular rates and rhythm    Diagnostic Test Results:  none     ASSESSMENT/PLAN:   1. Rhinosinusitis  With duration and exam will start below. Begin otc supportive cares additionally. Follow-up if not improving.   - amoxicillin-clavulanate (AUGMENTIN) 875-125 MG tablet; Take 1 tablet by mouth 2 times daily for 10 days  Dispense: 20 tablet; Refill: 0    2. Encounter for screening mammogram for breast cancer  Due.  - *[place] Screening Digital Bilateral; Future    3. Special screening for malignant neoplasms, colon  Way overdue. Reviewed recommendations.   - GASTROENTEROLOGY ADULT REF PROCEDURE ONLY [facility] [phone]; No Provider Preference    RECOMMEND annual physical, will need pap in April      Fermín Larios PA-C  [facility]  "

## 2019-01-03 ENCOUNTER — TELEPHONE (OUTPATIENT)
Dept: FAMILY MEDICINE | Facility: CLINIC | Age: 59
End: 2019-01-03

## 2019-01-03 NOTE — TELEPHONE ENCOUNTER
Type of outreach:  None, discussed with patient at visit on 12/13/18.  Health Maintenance Due   Topic Date Due     HIV SCREEN (SYSTEM ASSIGNED)  06/20/1980     HEPATITIS C SCREENING  06/20/1980     LIPID SCREEN Q5 YR FEMALE (SYSTEM ASSIGNED)  06/20/2007     MAMMO SCREEN Q2 YR (SYSTEM ASSIGNED)  07/01/2009     COLON CANCER SCREEN (SYSTEM ASSIGNED)  06/20/2012     ZOSTER IMMUNIZATION (1 of 2) 06/20/2012     PAP Q1 YR  04/28/2017     ADVANCE DIRECTIVE PLANNING Q5 YRS  06/20/2017     Patient has mammogram scheduled for 1/4/19. Knows that she is due for a colonoscopy, order placed. Also knows she is due for pap and physical in April.   Cari Dias MA

## 2019-01-04 ENCOUNTER — ANCILLARY PROCEDURE (OUTPATIENT)
Dept: MAMMOGRAPHY | Facility: CLINIC | Age: 59
End: 2019-01-04
Attending: PHYSICIAN ASSISTANT

## 2019-01-04 DIAGNOSIS — Z12.31 ENCOUNTER FOR SCREENING MAMMOGRAM FOR BREAST CANCER: ICD-10-CM

## 2019-01-04 PROCEDURE — 77067 SCR MAMMO BI INCL CAD: CPT | Mod: TC

## 2019-01-30 ENCOUNTER — TELEPHONE (OUTPATIENT)
Dept: FAMILY MEDICINE | Facility: CLINIC | Age: 59
End: 2019-01-30

## 2019-01-30 RX ORDER — DEXTROAMPHETAMINE SACCHARATE, AMPHETAMINE ASPARTATE MONOHYDRATE, DEXTROAMPHETAMINE SULFATE AND AMPHETAMINE SULFATE 5; 5; 5; 5 MG/1; MG/1; MG/1; MG/1
20 CAPSULE, EXTENDED RELEASE ORAL EVERY MORNING
Qty: 30 CAPSULE | Status: CANCELLED | OUTPATIENT
Start: 2019-01-30

## 2019-01-30 RX ORDER — DEXTROAMPHETAMINE SACCHARATE, AMPHETAMINE ASPARTATE, DEXTROAMPHETAMINE SULFATE AND AMPHETAMINE SULFATE 5; 5; 5; 5 MG/1; MG/1; MG/1; MG/1
20 TABLET ORAL DAILY
Qty: 30 TABLET | Status: CANCELLED | OUTPATIENT
Start: 2019-01-30

## 2019-01-30 NOTE — TELEPHONE ENCOUNTER
Pt called today wanting Adderall refilled today by the end of the day. Adv that Sacha Larios and Dr Trujillo have not prescribed this medication and that they saw her for acute issues. She said that her and Sacha talked about the medications and refills and he said just call. Adv that I could send it to Sacha, but he is off today and it is not noted in his office note so another provider will not fill. Also adv that we get a lot of refill requests and she needs to give us at least 3 business days. She is very frustrated that she cannot get it today at 345pm. I offered to send to Sacha and offered appointment first thing 8am tomorrow morning. She was very hesitant did not want to schedule or know how she would do without her meds tomorrow. Adv that we have a pharm here or she could always go back to her last prescribing physician. Pt reluctantly scheduled (I put in a 40min appt). She is wanting to be prescribed Adderall 20mg xr q am and 20mg q afternoon.       checked.   12/30/18 #30 20 mg       #30 20 mg ER  11/30/18 #30 20 mg      #30  20 mg ER  11/2/18   #30  20 mg  10/30/18 #30  20 mg ER

## 2019-01-31 ENCOUNTER — OFFICE VISIT (OUTPATIENT)
Dept: FAMILY MEDICINE | Facility: CLINIC | Age: 59
End: 2019-01-31

## 2019-01-31 VITALS
RESPIRATION RATE: 12 BRPM | TEMPERATURE: 97 F | OXYGEN SATURATION: 100 % | BODY MASS INDEX: 27.95 KG/M2 | HEART RATE: 84 BPM | WEIGHT: 184.4 LBS | DIASTOLIC BLOOD PRESSURE: 80 MMHG | SYSTOLIC BLOOD PRESSURE: 115 MMHG | HEIGHT: 68 IN

## 2019-01-31 DIAGNOSIS — F98.8 ATTENTION DEFICIT DISORDER (ADD) WITHOUT HYPERACTIVITY: Primary | ICD-10-CM

## 2019-01-31 DIAGNOSIS — R09.81 NASAL CONGESTION: ICD-10-CM

## 2019-01-31 PROCEDURE — 99213 OFFICE O/P EST LOW 20 MIN: CPT | Performed by: PHYSICIAN ASSISTANT

## 2019-01-31 RX ORDER — DEXTROAMPHETAMINE SACCHARATE, AMPHETAMINE ASPARTATE, DEXTROAMPHETAMINE SULFATE AND AMPHETAMINE SULFATE 5; 5; 5; 5 MG/1; MG/1; MG/1; MG/1
20 TABLET ORAL 2 TIMES DAILY
Qty: 60 TABLET | Refills: 0 | Status: SHIPPED | OUTPATIENT
Start: 2019-01-31 | End: 2019-03-01

## 2019-01-31 ASSESSMENT — MIFFLIN-ST. JEOR: SCORE: 1474.93

## 2019-01-31 NOTE — PATIENT INSTRUCTIONS
Consider using generic mucinex or flonase to help with the current congestion    Come back soon for your physical and PAP

## 2019-01-31 NOTE — PROGRESS NOTES
SUBJECTIVE:   Joanne Padilla is a 58 year old female who presents to clinic today for the following health issues:    Patient is here to discuss ADHD and have medication refilled.   She has been on adderall xr 20 and adderall 20 sa for many years  She has a hx of ADD; diagnosed by Dr. Alcocer  Pt has been getting medication from [facility] (Dr. Alcocer)  She began with XR only but noted she was struggling in the afternoons   -added SA at that point  She does tolerate this well; denies any chest pain, palpitations  Does not affect sleep  Today found out her Ex stopped paying for insurance so was wondering about getting 20mg SA twice daily       Problem list and histories reviewed & adjusted, as indicated.  Additional history: as documented    Patient Active Problem List   Diagnosis     Tobacco abuse     Chronic rhinitis     Atopic dermatitis     Migraines     Past Surgical History:   Procedure Laterality Date      SECTION       COLONOSCOPY       DAVINCI LAPAROSCOPIC CHOLECYSTECTOMY WITHOUT GRAMS  2013    Procedure: DAVINCI LAPAROSCOPIC CHOLECYSTECTOMY WITHOUT GRAMS;  DAVINCI CHOLECYSTECTOMY;  Surgeon: Jac Stallings MD;  Location: [facility] OR     GYN SURGERY      repair of fallopian tubes and ovaries     LUMPECTOMY BREAST       LUMPECTOMY BREAST       NOSE SURGERY         Social History     Tobacco Use     Smoking status: Current Every Day Smoker     Packs/day: 0.50     Years: 15.00     Pack years: 7.50     Types: Cigarettes     Smokeless tobacco: Never Used     Tobacco comment: Has not been smoking lately but has not quit, [place] -13    Substance Use Topics     Alcohol use: No     Family History   Problem Relation Age of Onset     C.A.D. Mother         MI early 50s;  59 heart problems     C.A.D. Father            Reviewed and updated as needed this visit by clinical staff       Reviewed and updated as needed this visit by Provider         ROS:   CONSTITUTIONAL: NEGATIVE for  "fever, chills, change in weight  ENT/MOUTH: POSITIVE for nasal congestion, postnasal drainage and headache  RESP: NEGATIVE for significant cough or SOB  CV: NEGATIVE for chest pain, palpitations or peripheral edema  ROS otherwise negative    OBJECTIVE:     /80   Pulse 84   Temp 97  F (36.1  C) (Tympanic)   Resp 12   Ht 1.727 m (5' 8\")   Wt 83.6 kg (184 lb 6.4 oz)   SpO2 100%   BMI 28.04 kg/m    Body mass index is 28.04 kg/m .  GENERAL: healthy, alert and no distress  EYES: Eyes grossly normal to inspection, PERRL and conjunctivae and sclerae normal  HENT: normal cephalic/atraumatic, ear canals and TM's normal, nasal mucosa edematous , rhinorrhea thick, oropharynx raw and sinuses: maxillary, frontal tenderness on both sides  RESP: lungs clear to auscultation - no rales, rhonchi or wheezes  CV: regular rates and rhythm, normal S1 S2, no S3 or S4 and no murmur, click or rub  PSYCH: mentation appears normal, affect normal/bright    Diagnostic Test Results:  none     ASSESSMENT/PLAN:   1. Attention deficit disorder (ADD) without hyperactivity  She has been on this historically for quite some time, first dx and prescribed by her former primary care provider. Today she is without insurance and hesitant to pay for LA. Ok to trial SA for one month. Additionally, we reviewed the elevated doses that I would prefer to see her lower. Especially if there is some chance for other dx playing a role. Mood? We'll review at her next appt.   - amphetamine-dextroamphetamine (ADDERALL) 20 MG tablet; Take 1 tablet (20 mg) by mouth 2 times daily  Dispense: 60 tablet; Refill: 0    2. Nasal congestion  Recommend trial of flonase either in replacement of or instead of her antihistamines.      Fermín Larios PA-C  [facility]  "

## 2019-02-15 ENCOUNTER — TELEPHONE (OUTPATIENT)
Dept: FAMILY MEDICINE | Facility: CLINIC | Age: 59
End: 2019-02-15

## 2019-02-15 NOTE — TELEPHONE ENCOUNTER
Type of outreach:  None  Health Maintenance Due   Topic Date Due     HIV SCREEN (SYSTEM ASSIGNED)  06/20/1980     HEPATITIS C SCREENING  06/20/1980     LIPID SCREEN Q5 YR FEMALE (SYSTEM ASSIGNED)  06/20/2007     COLON CANCER SCREEN (SYSTEM ASSIGNED)  06/20/2012     ZOSTER IMMUNIZATION (1 of 2) 06/20/2012     PAP Q1 YR  04/28/2017     ADVANCE DIRECTIVE PLANNING Q5 YRS  06/20/2017     Pt will schedule a pap and PX sometime soon, discussed at last OV. Last pap was sometime in 2016.  Cari Dias MA

## 2019-03-01 DIAGNOSIS — F98.8 ATTENTION DEFICIT DISORDER (ADD) WITHOUT HYPERACTIVITY: ICD-10-CM

## 2019-03-01 RX ORDER — DEXTROAMPHETAMINE SACCHARATE, AMPHETAMINE ASPARTATE, DEXTROAMPHETAMINE SULFATE AND AMPHETAMINE SULFATE 5; 5; 5; 5 MG/1; MG/1; MG/1; MG/1
20 TABLET ORAL 2 TIMES DAILY
Qty: 60 TABLET | Refills: 0 | Status: SHIPPED | OUTPATIENT
Start: 2019-03-01 | End: 2019-04-12

## 2019-03-01 NOTE — TELEPHONE ENCOUNTER
Pt is calling today and only has one pill left. She wants it filled today.  Would like 3 scripts post dated.    Pt would like phone call. She wants to pick it up now. She works in [place].  [phone]

## 2019-03-01 NOTE — TELEPHONE ENCOUNTER
Requested Prescriptions   Signed Prescriptions Disp Refills     amphetamine-dextroamphetamine (ADDERALL) 20 MG tablet  Last Written Prescription Date:  03/01/2019  Last Fill Quantity: 60 tablet,  # refills: 0   Last office visit: 1/31/2019 with prescribing provider:  Fermín Larios PA-C    Future Office Visit:     60 tablet 0     Sig: Take 1 tablet (20 mg) by mouth 2 times daily    There is no refill protocol information for this order     Routing refill request to provider for review/approval because:  Drug not on the [facility] [place], P or [facility] refill protocol or controlled substance

## 2019-04-09 NOTE — PROGRESS NOTES
SUBJECTIVE:   Joanne Padilla is a 58 year old female who presents to clinic today for the following   health issues:    Medication Followup of Adderall     Taking Medication as prescribed: yes    Side Effects:  None    Medication Helping Symptoms:  yes     -Patient presents to follow up for adderall  -we had switched her to 20mg SA when XR became too expensive without insurance  -unfortunately she notes she is having insurance problems with work currently as well and still does not have insurance  -20mg twice daily continues to go well without side effects  -sleep is ok  -still some stress with work and kids  -denies chest pain, palpitations      RESPIRATORY SYMPTOMS/ALLERGIES    Duration: On and off x 6 months    Description  nasal congestion, rhinorrhea, facial pain/pressure and headache    Severity: moderate    Accompanying signs and symptoms: Nose bleeds, vomiting    History (predisposing factors):  none    Precipitating or alleviating factors: None    Therapies tried and outcome:  OTC NSAID    ALLERGIES    Duration: 2 weeks    Description:   Nasal congestion: YES  Sneezing: no   Red, itchy eyes: no     Accompanying signs and symptoms: Skin allergies    History (similar episodes/allergy testing): Yes    Precipitating or alleviating factors: None    Therapies tried and outcome: Ice with relief      Joanne presents today also to discuss increased allergy symptoms  -she notes a hx of multiple allergy symptoms both with sinus symptoms as well as skin rash   -the skin rash seems to move locations: back, neck, extremities   -she often needs to treat with ice which seems to help  -has had the symptoms for at least 6 months  -there is lots of congestion/sinus pressure - can cause migraines  -some nose bleeds; ears feeling plugged  -wheezing coming and going  -has had these symptoms previously but not this bad   -did actually need allergy shot in the past  -today she takes almost no medications for  these      Additional history: as documented    Reviewed  and updated as needed this visit by clinical staff         Reviewed and updated as needed this visit by Provider         Patient Active Problem List   Diagnosis     Tobacco abuse     Chronic rhinitis     Atopic dermatitis     Migraines     Past Surgical History:   Procedure Laterality Date      SECTION       COLONOSCOPY       DAVINCI LAPAROSCOPIC CHOLECYSTECTOMY WITHOUT GRAMS  2013    Procedure: DAVINCI LAPAROSCOPIC CHOLECYSTECTOMY WITHOUT GRAMS;  DAVINCI CHOLECYSTECTOMY;  Surgeon: Jac Stallings MD;  Location: [facility]     GYN SURGERY      repair of fallopian tubes and ovaries     LUMPECTOMY BREAST       LUMPECTOMY BREAST       NOSE SURGERY         Social History     Tobacco Use     Smoking status: Current Every Day Smoker     Packs/day: 0.50     Years: 15.00     Pack years: 7.50     Types: Cigarettes     Smokeless tobacco: Never Used     Tobacco comment: Has not been smoking lately but has not quit, [place] -13    Substance Use Topics     Alcohol use: No     Family History   Problem Relation Age of Onset     C.A.D. Mother         MI early 50s;  59 heart problems     C.A.D. Father            ROS:  Constitutional, HEENT, cardiovascular, pulmonary, gi and gu systems are negative, except as otherwise noted.    OBJECTIVE:     /72   Pulse 84   Temp 96.8  F (36  C) (Tympanic)   Wt 83 kg (182 lb 14.4 oz)   SpO2 98%   BMI 27.81 kg/m    Body mass index is 27.81 kg/m .  GENERAL: healthy, alert and no distress  EYES: Eyes grossly normal to inspection, PERRL and conjunctivae and sclerae normal  HENT: normal cephalic/atraumatic, ear canals and TM's normal, nasal mucosa edematous , rhinorrhea clear, oropharynx clear and oral mucous membranes moist  RESP: lungs clear to auscultation - no rales, rhonchi or wheezes  CV: regular rates and rhythm, normal S1 S2, no S3 or S4 and no murmur, click or rub  SKIN: along the upper extremities  bilaterally are erythematous patches but no urticarial lesions. Evidence of excoriation    Diagnostic Test Results:  none     ASSESSMENT/PLAN:   1. Attention deficit disorder (ADD) without hyperactivity  Refilling. Continue present management.   - amphetamine-dextroamphetamine (ADDERALL) 20 MG tablet; Take 1 tablet (20 mg) by mouth 2 times daily  Dispense: 60 tablet; Refill: 0    2. Allergic rhinitis, unspecified seasonality, unspecified trigger  3. Skin rash  Lengthy hx of these symptoms including rash. She has not used any treatments recently and was at one time on injections. I will start the below but given the severity will additionally refer her to allergy  - fluticasone (FLONASE) 50 MCG/ACT nasal spray; Spray 1 spray into both nostrils 2 times daily  Dispense: 16 g; Refill: 3  - cetirizine (ZYRTEC) 10 MG tablet; Take 1 tablet (10 mg) by mouth daily  Dispense: 90 tablet; Refill: 0  - albuterol (VENTOLIN HFA) 108 (90 Base) MCG/ACT inhaler; Inhale 2 puffs into the lungs every 6 hours as needed for shortness of breath / dyspnea or wheezing  Dispense: 8.5 g; Refill: 0  - ALLERGY/ASTHMA ADULT REFERRAL      Fermín Larios PA-C  [facility]

## 2019-04-12 ENCOUNTER — OFFICE VISIT (OUTPATIENT)
Dept: FAMILY MEDICINE | Facility: CLINIC | Age: 59
End: 2019-04-12

## 2019-04-12 VITALS
DIASTOLIC BLOOD PRESSURE: 72 MMHG | SYSTOLIC BLOOD PRESSURE: 118 MMHG | TEMPERATURE: 96.8 F | OXYGEN SATURATION: 98 % | WEIGHT: 182.9 LBS | BODY MASS INDEX: 27.81 KG/M2 | HEART RATE: 84 BPM

## 2019-04-12 DIAGNOSIS — J30.9 ALLERGIC RHINITIS, UNSPECIFIED SEASONALITY, UNSPECIFIED TRIGGER: ICD-10-CM

## 2019-04-12 DIAGNOSIS — F98.8 ATTENTION DEFICIT DISORDER (ADD) WITHOUT HYPERACTIVITY: Primary | ICD-10-CM

## 2019-04-12 DIAGNOSIS — R21 SKIN RASH: ICD-10-CM

## 2019-04-12 PROCEDURE — 99214 OFFICE O/P EST MOD 30 MIN: CPT | Performed by: PHYSICIAN ASSISTANT

## 2019-04-12 RX ORDER — FLUTICASONE PROPIONATE 50 MCG
1 SPRAY, SUSPENSION (ML) NASAL 2 TIMES DAILY
Qty: 16 G | Refills: 3 | Status: SHIPPED | OUTPATIENT
Start: 2019-04-12 | End: 2019-10-11

## 2019-04-12 RX ORDER — DEXTROAMPHETAMINE SACCHARATE, AMPHETAMINE ASPARTATE, DEXTROAMPHETAMINE SULFATE AND AMPHETAMINE SULFATE 5; 5; 5; 5 MG/1; MG/1; MG/1; MG/1
20 TABLET ORAL DAILY
Status: CANCELLED | OUTPATIENT
Start: 2019-04-12

## 2019-04-12 RX ORDER — CETIRIZINE HYDROCHLORIDE 10 MG/1
10 TABLET ORAL DAILY
Qty: 90 TABLET | Refills: 0 | Status: SHIPPED | OUTPATIENT
Start: 2019-04-12 | End: 2020-02-24

## 2019-04-12 RX ORDER — ALBUTEROL SULFATE 90 UG/1
2 AEROSOL, METERED RESPIRATORY (INHALATION) EVERY 6 HOURS PRN
Qty: 8.5 G | Refills: 0 | Status: SHIPPED | OUTPATIENT
Start: 2019-04-12 | End: 2021-07-15

## 2019-04-12 RX ORDER — DEXTROAMPHETAMINE SACCHARATE, AMPHETAMINE ASPARTATE, DEXTROAMPHETAMINE SULFATE AND AMPHETAMINE SULFATE 5; 5; 5; 5 MG/1; MG/1; MG/1; MG/1
20 TABLET ORAL 2 TIMES DAILY
Qty: 60 TABLET | Refills: 0 | Status: SHIPPED | OUTPATIENT
Start: 2019-04-12 | End: 2019-05-15

## 2019-04-12 RX ORDER — ALBUTEROL SULFATE 90 UG/1
2 AEROSOL, METERED RESPIRATORY (INHALATION)
Qty: 8.5 G | Status: CANCELLED | OUTPATIENT
Start: 2019-04-12

## 2019-04-17 ENCOUNTER — ALLIED HEALTH/NURSE VISIT (OUTPATIENT)
Dept: NURSING | Facility: CLINIC | Age: 59
End: 2019-04-17

## 2019-04-17 DIAGNOSIS — Z53.9 DIAGNOSIS NOT YET DEFINED: Primary | ICD-10-CM

## 2019-04-17 NOTE — PROGRESS NOTES
Patient c/o hard, tender warm area Lt shoulder blade. Onset yesterday.    Reddened hard area size of quarter on lower Lt shoulder blade. No drainage.    Advised patient to be seen for possible infection. Patient did not want to be seen today. Made appt for tomorrow 4/18.    Advised area size of quarter. If increases should be seen in [facility].    Radha Arguello RN

## 2019-04-18 ENCOUNTER — OFFICE VISIT (OUTPATIENT)
Dept: FAMILY MEDICINE | Facility: CLINIC | Age: 59
End: 2019-04-18

## 2019-04-18 VITALS
BODY MASS INDEX: 27.81 KG/M2 | DIASTOLIC BLOOD PRESSURE: 70 MMHG | HEIGHT: 68 IN | RESPIRATION RATE: 16 BRPM | TEMPERATURE: 98 F | SYSTOLIC BLOOD PRESSURE: 115 MMHG | HEART RATE: 89 BPM | OXYGEN SATURATION: 100 %

## 2019-04-18 DIAGNOSIS — L72.0 INFECTED EPIDERMOID CYST: Primary | ICD-10-CM

## 2019-04-18 DIAGNOSIS — L08.9 INFECTED EPIDERMOID CYST: Primary | ICD-10-CM

## 2019-04-18 PROCEDURE — 99213 OFFICE O/P EST LOW 20 MIN: CPT | Performed by: PHYSICIAN ASSISTANT

## 2019-04-18 RX ORDER — SULFAMETHOXAZOLE/TRIMETHOPRIM 800-160 MG
1 TABLET ORAL 2 TIMES DAILY
Qty: 14 TABLET | Refills: 0 | Status: SHIPPED | OUTPATIENT
Start: 2019-04-18 | End: 2019-06-17

## 2019-04-18 NOTE — PROGRESS NOTES
SUBJECTIVE:   Joanne Padilla is a 58 year old female who presents to clinic today for the following   health issues:    Musculoskeletal problem/pain    Duration: Noticed with last few days     Description  Location: left shoulder blade     Intensity:  moderate    Accompanying signs and symptoms: warmth, hard, redness, itching and tender     History  Previous similar problem: no   Previous evaluation:  none    Precipitating or alleviating factors:  Trauma or overuse: no   Aggravating factors include: touching     Therapies tried and outcome: hot water in shower     -Patient presents with a new bump along the left shoulder blade  -she notes it looks red in the mirror  -unsure how long its been there but worse over past few days  -it is quite tender, starting to itch  -she does feel it when leaning against the chair   -no fevers or chills  -does not think any discharge    Additional history: as documented    Reviewed  and updated as needed this visit by clinical staff         Reviewed and updated as needed this visit by Provider         Patient Active Problem List   Diagnosis     Tobacco abuse     Chronic rhinitis     Atopic dermatitis     Migraines     Allergic rhinitis     Past Surgical History:   Procedure Laterality Date      SECTION       COLONOSCOPY       DAVINCI LAPAROSCOPIC CHOLECYSTECTOMY WITHOUT GRAMS  2013    Procedure: DAVINCI LAPAROSCOPIC CHOLECYSTECTOMY WITHOUT GRAMS;  DAVINCI CHOLECYSTECTOMY;  Surgeon: Jac Stallings MD;  Location: [facility]     GYN SURGERY      repair of fallopian tubes and ovaries     LUMPECTOMY BREAST       LUMPECTOMY BREAST       NOSE SURGERY         Social History     Tobacco Use     Smoking status: Current Every Day Smoker     Packs/day: 0.50     Years: 15.00     Pack years: 7.50     Types: Cigarettes     Smokeless tobacco: Never Used     Tobacco comment: Has not been smoking lately but has not quit, [place] -13    Substance Use Topics     Alcohol use:  "No     Family History   Problem Relation Age of Onset     C.A.D. Mother         MI early 50s;  59 heart problems     C.A.D. Father            ROS:  Constitutional, HEENT, cardiovascular, pulmonary, gi and gu systems are negative, except as otherwise noted.    OBJECTIVE:     /70   Pulse 89   Temp 98  F (36.7  C) (Tympanic)   Resp 16   Ht 1.727 m (5' 8\")   SpO2 100%   BMI 27.81 kg/m    Body mass index is 27.81 kg/m .  GENERAL: healthy, alert and no distress  SKIN: there is a small 2cm diameter area of tender induration with central punctum. It is mildly red. There is no discharge able to be expressed.    Diagnostic Test Results:  none     ASSESSMENT/PLAN:   1. Infected epidermoid cyst  At this point the area did not seem to be fully amenable to I&D given the limited swelling actually away from the skin. Will try to calm this down some with below and heat. If not improving or worsening, consider repeat eval and I&D.  - sulfamethoxazole-trimethoprim (BACTRIM DS/SEPTRA DS) 800-160 MG tablet; Take 1 tablet by mouth 2 times daily for 7 days  Dispense: 14 tablet; Refill: 0      Fermín Larios PA-C  [facility]      "

## 2019-05-15 ENCOUNTER — OFFICE VISIT (OUTPATIENT)
Dept: FAMILY MEDICINE | Facility: CLINIC | Age: 59
End: 2019-05-15

## 2019-05-15 VITALS
TEMPERATURE: 98 F | SYSTOLIC BLOOD PRESSURE: 110 MMHG | OXYGEN SATURATION: 97 % | HEART RATE: 94 BPM | DIASTOLIC BLOOD PRESSURE: 74 MMHG | RESPIRATION RATE: 18 BRPM

## 2019-05-15 DIAGNOSIS — R05.9 COUGH: ICD-10-CM

## 2019-05-15 DIAGNOSIS — J01.90 ACUTE SINUSITIS WITH SYMPTOMS > 10 DAYS: Primary | ICD-10-CM

## 2019-05-15 DIAGNOSIS — F98.8 ATTENTION DEFICIT DISORDER (ADD) WITHOUT HYPERACTIVITY: ICD-10-CM

## 2019-05-15 PROCEDURE — 99213 OFFICE O/P EST LOW 20 MIN: CPT | Performed by: NURSE PRACTITIONER

## 2019-05-15 RX ORDER — DOXYCYCLINE 100 MG/1
100 CAPSULE ORAL 2 TIMES DAILY
Qty: 20 CAPSULE | Refills: 0 | Status: SHIPPED | OUTPATIENT
Start: 2019-05-15 | End: 2019-06-17

## 2019-05-15 RX ORDER — DEXTROAMPHETAMINE SACCHARATE, AMPHETAMINE ASPARTATE, DEXTROAMPHETAMINE SULFATE AND AMPHETAMINE SULFATE 5; 5; 5; 5 MG/1; MG/1; MG/1; MG/1
20 TABLET ORAL 2 TIMES DAILY
Qty: 60 TABLET | Refills: 0 | Status: SHIPPED | OUTPATIENT
Start: 2019-05-15 | End: 2019-06-14

## 2019-05-15 NOTE — PROGRESS NOTES
SUBJECTIVE:   Joanne Padilla is a 58 year old female who presents to clinic today for the following   health issues:      RESPIRATORY SYMPTOMS      Duration: 1 month    Description  nasal congestion, facial pain/pressure, chills and headache    Severity: severe    Accompanying signs and symptoms: cough    History (predisposing factors):  none    Precipitating or alleviating factors: None    Therapies tried and outcome:  none    Congestion, cough.  Sinus pressure.  Has started to have chills intermittently.  No GI symptoms.    Additional history: as documented    Reviewed  and updated as needed this visit by clinical staff         Reviewed and updated as needed this visit by Provider         Patient Active Problem List   Diagnosis     Tobacco abuse     Chronic rhinitis     Atopic dermatitis     Migraines     Allergic rhinitis     Past Surgical History:   Procedure Laterality Date      SECTION       COLONOSCOPY       DAVINCI LAPAROSCOPIC CHOLECYSTECTOMY WITHOUT GRAMS  2013    Procedure: DAVINCI LAPAROSCOPIC CHOLECYSTECTOMY WITHOUT GRAMS;  DAVINCI CHOLECYSTECTOMY;  Surgeon: Jac Stallings MD;  Location: [facility]     GYN SURGERY      repair of fallopian tubes and ovaries     LUMPECTOMY BREAST       LUMPECTOMY BREAST       NOSE SURGERY         Social History     Tobacco Use     Smoking status: Current Every Day Smoker     Packs/day: 0.50     Years: 15.00     Pack years: 7.50     Types: Cigarettes     Smokeless tobacco: Never Used     Tobacco comment: Has not been smoking lately but has not quit, [place] -13    Substance Use Topics     Alcohol use: No     Family History   Problem Relation Age of Onset     C.A.D. Mother         MI early 50s;  59 heart problems     C.A.D. Father            ROS:  SEE HPI.    OBJECTIVE:     /74 (BP Location: Right arm, Patient Position: Chair, Cuff Size: Adult Regular)   Pulse 94   Temp 98  F (36.7  C) (Tympanic)   Resp 18   LMP 2019  (Approximate)   SpO2 97%   There is no height or weight on file to calculate BMI.  GENERAL: healthy, alert and no distress  EYES: Eyes grossly normal to inspection  HENT: ear canals and TM's normal, nose and mouth without ulcers or lesions  NECK: no adenopathy, no asymmetry, masses, or scars and thyroid normal to palpation  RESP: lungs clear to auscultation - no rales, rhonchi or wheezes  CV: regular rates and rhythm and normal S1 S2, no S3 or S4  PSYCH: mentation appears normal, affect normal/bright    Diagnostic Test Results:  none     ASSESSMENT/PLAN:   1. Acute sinusitis with symptoms > 10 days  Symptoms x1 month, worsening.  Start doxy.  Continue symptomatic care.  Pt agrees with plan and verbalized understanding.  - doxycycline monohydrate (MONODOX) 100 MG capsule; Take 1 capsule (100 mg) by mouth 2 times daily for 10 days  Dispense: 20 capsule; Refill: 0    2. Cough  Doxy to cover cough as well.    - doxycycline monohydrate (MONODOX) 100 MG capsule; Take 1 capsule (100 mg) by mouth 2 times daily for 10 days  Dispense: 20 capsule; Refill: 0    3. Attention deficit disorder (ADD) without hyperactivity  Tolerating well, follow up with pcp as planned.  - amphetamine-dextroamphetamine (ADDERALL) 20 MG tablet; Take 1 tablet (20 mg) by mouth 2 times daily  Dispense: 60 tablet; Refill: 0    DONALD Perez Ra [facility]

## 2019-06-14 DIAGNOSIS — F98.8 ATTENTION DEFICIT DISORDER (ADD) WITHOUT HYPERACTIVITY: ICD-10-CM

## 2019-06-14 RX ORDER — DEXTROAMPHETAMINE SACCHARATE, AMPHETAMINE ASPARTATE, DEXTROAMPHETAMINE SULFATE AND AMPHETAMINE SULFATE 5; 5; 5; 5 MG/1; MG/1; MG/1; MG/1
20 TABLET ORAL 2 TIMES DAILY
Qty: 6 TABLET | Refills: 0 | Status: SHIPPED | OUTPATIENT
Start: 2019-06-14 | End: 2020-02-24

## 2019-06-14 NOTE — TELEPHONE ENCOUNTER
Adderall      Last Written Prescription Date:  5/15/19  Last Fill Quantity: 60 tablets,   # refills: 0  Last Office Visit: 5/15/19  Future Office visit:       Routing refill request to provider for review/approval because:  Drug not on the [facility], [facility] or [facility] refill protocol or controlled substance    Delfina Jeronimo RN, BSN

## 2019-06-14 NOTE — TELEPHONE ENCOUNTER
Requested Prescriptions   Pending Prescriptions Disp Refills     amphetamine-dextroamphetamine (ADDERALL) 20 MG tablet 60 tablet 0     Sig: Take 1 tablet (20 mg) by mouth 2 times daily       There is no refill protocol information for this order        Last Written Prescription Date:  5/15/19  Last Fill Quantity: 60,  # refills: 0    Last office visit: 5/15/2019 with prescribing provider:  Radha Joseph Ra, APRN CNP       Future Office Visit:

## 2019-06-17 ENCOUNTER — OFFICE VISIT (OUTPATIENT)
Dept: FAMILY MEDICINE | Facility: CLINIC | Age: 59
End: 2019-06-17

## 2019-06-17 VITALS
SYSTOLIC BLOOD PRESSURE: 101 MMHG | OXYGEN SATURATION: 100 % | HEART RATE: 87 BPM | DIASTOLIC BLOOD PRESSURE: 78 MMHG | HEIGHT: 68 IN | RESPIRATION RATE: 18 BRPM | BODY MASS INDEX: 27.81 KG/M2 | TEMPERATURE: 97 F

## 2019-06-17 DIAGNOSIS — Z72.820 LACK OF ADEQUATE SLEEP: ICD-10-CM

## 2019-06-17 DIAGNOSIS — L72.0 INFECTED EPIDERMOID CYST: ICD-10-CM

## 2019-06-17 DIAGNOSIS — F98.8 ATTENTION DEFICIT DISORDER (ADD) WITHOUT HYPERACTIVITY: Primary | ICD-10-CM

## 2019-06-17 DIAGNOSIS — L08.9 INFECTED EPIDERMOID CYST: ICD-10-CM

## 2019-06-17 PROCEDURE — 99213 OFFICE O/P EST LOW 20 MIN: CPT | Performed by: PHYSICIAN ASSISTANT

## 2019-06-17 RX ORDER — DEXTROAMPHETAMINE SACCHARATE, AMPHETAMINE ASPARTATE, DEXTROAMPHETAMINE SULFATE AND AMPHETAMINE SULFATE 5; 5; 5; 5 MG/1; MG/1; MG/1; MG/1
20 TABLET ORAL 2 TIMES DAILY
Qty: 60 TABLET | Refills: 0 | Status: SHIPPED | OUTPATIENT
Start: 2019-06-17 | End: 2019-10-11

## 2019-06-17 NOTE — PROGRESS NOTES
"Subjective     Joanne Padilla is a 58 year old female who presents to clinic today for the following health issues:    HPI   Medication Followup of Adderall     Taking Medication as prescribed: yes    Side Effects:  None    Medication Helping Symptoms:  yes   -Patient is a 57yo female who presents to follow up on adderall Rx   -she was previously on [place] adderall but was switched to SA while having problems with insurance as this was cheaper  -She thinks taking 2 SA in the morning provides the best control  -She notes when she finally takes in the morning her \"brain slows down\" and she can \"process more information\"   -later in the day she notes feeling less well controlled  -Does feel like a lot of her symptoms increased during/after her divorce  -At times feels like her \"brain is not connected\"  -Denies current symptoms of depression/anxiety  -doesn't sleep more than 4-5 hours nightly   -blames on busy schedule      Patient Active Problem List   Diagnosis     Tobacco abuse     Chronic rhinitis     Atopic dermatitis     Migraines     Allergic rhinitis     Past Surgical History:   Procedure Laterality Date      SECTION       COLONOSCOPY       DAVINCI LAPAROSCOPIC CHOLECYSTECTOMY WITHOUT GRAMS  2013    Procedure: DAVINCI LAPAROSCOPIC CHOLECYSTECTOMY WITHOUT GRAMS;  DAVINCI CHOLECYSTECTOMY;  Surgeon: Jac Stallings MD;  Location: [facility]     GYN SURGERY      repair of fallopian tubes and ovaries     LUMPECTOMY BREAST       LUMPECTOMY BREAST       NOSE SURGERY         Social History     Tobacco Use     Smoking status: Current Every Day Smoker     Packs/day: 0.50     Years: 15.00     Pack years: 7.50     Types: Cigarettes     Smokeless tobacco: Never Used     Tobacco comment: Has not been smoking lately but has not quit, [place] plan -13    Substance Use Topics     Alcohol use: No     Family History   Problem Relation Age of Onset     C.A.D. Mother         MI early 50s;  59 heart problems     " "C.A.D. Father          Reviewed and updated as needed this visit by Provider         Review of Systems   ROS COMP: Constitutional, HEENT, cardiovascular, pulmonary, gi and gu systems are negative, except as otherwise noted.      Objective    /78   Pulse 87   Temp 97  F (36.1  C) (Tympanic)   Resp 18   Ht 1.727 m (5' 8\")   SpO2 100%   BMI 27.81 kg/m    Body mass index is 27.81 kg/m .  Physical Exam   GENERAL: healthy, alert and no distress  RESP: lungs clear to auscultation - no rales, rhonchi or wheezes  CV: regular rate and rhythm, normal S1 S2, no S3 or S4, no murmur, click or rub, no peripheral edema and peripheral pulses strong  SKIN: there is a small palpable lesion with central punctum to the left upper back; mild discharge can be expressed  PSYCH: mentation appears normal, affect normal/bright    Diagnostic Test Results:  none         Assessment & Plan     1. Attention deficit disorder (ADD) without hyperactivity  Controlled however we again discussed that I preferred she get a neuropsych eval to determine if we are really working with ADD. I would like a concrete neuropsych test. Also to consider sleep study. She sleeps no more than 4-5 hours nightly and sometimes less. May be contributory  - MENTAL HEALTH REFERRAL  - Adult; Outpatient Treatment, Assessments and Testing; Neuropsychological Testing; Other: [facility] [phone]; We will contact you to schedule the appointment or please call with any questions; B...  - SLEEP EVALUATION & MANAGEMENT REFERRAL - ADULT -[facility]  [phone] (Age 18 and up); Future  - amphetamine-dextroamphetamine (ADDERALL) 20 MG tablet; Take 1 tablet (20 mg) by mouth 2 times daily  Dispense: 60 tablet; Refill: 0    2. Lack of adequate sleep  I do think this is an important aspect to her health. She will work to get more sleep. Consider stdy  - MENTAL HEALTH REFERRAL  - Adult; Outpatient Treatment, Assessments and " Testing; Neuropsychological Testing; Other: [facility] [phone]; We will contact you to schedule the appointment or please call with any questions; B...  - SLEEP EVALUATION & MANAGEMENT REFERRAL - ADULT -[facility] [place]  [phone] (Age 18 and up); Future    3. Infected epidermoid cyst  This has improved since receiving abx. I do not think this is amenable to I&d today. Consider removal down the road if not fully improving       Tobacco Cessation:   reports that she has been smoking cigarettes.  She has a 7.50 pack-year smoking history. She has never used smokeless tobacco.    Return in about 3 months (around 9/17/2019).    Fermín Larios PA-C  [facility]

## 2019-07-16 DIAGNOSIS — F98.8 ATTENTION DEFICIT DISORDER (ADD) WITHOUT HYPERACTIVITY: ICD-10-CM

## 2019-07-16 RX ORDER — DEXTROAMPHETAMINE SACCHARATE, AMPHETAMINE ASPARTATE, DEXTROAMPHETAMINE SULFATE AND AMPHETAMINE SULFATE 5; 5; 5; 5 MG/1; MG/1; MG/1; MG/1
20 TABLET ORAL 2 TIMES DAILY
Qty: 6 TABLET | Refills: 0 | Status: CANCELLED | OUTPATIENT
Start: 2019-07-16

## 2019-07-16 NOTE — TELEPHONE ENCOUNTER
Requested Prescriptions   Pending Prescriptions Disp Refills     amphetamine-dextroamphetamine (ADDERALL) 20 MG tablet 6 tablet 0     Sig: Take 1 tablet (20 mg) by mouth 2 times daily       There is no refill protocol information for this order        Last Written Prescription Date:  6/17/19  Last Fill Quantity: 60,  # refills: 0   Last office visit: 6/17/2019 with prescribing provider:  Fermín Larios PA-C    Future Office Visit:

## 2019-07-17 NOTE — TELEPHONE ENCOUNTER
Last 4 fills per [place] ;    6/17/2019, 6/14/2019 qty 6 tabs, 5/15/2019, and 4/12/2019.    OV due 9/17/2019.    T'd up 2 month panel.    Please print,sign and take to [facility].    Radha Arguello RN

## 2019-07-18 RX ORDER — DEXTROAMPHETAMINE SACCHARATE, AMPHETAMINE ASPARTATE, DEXTROAMPHETAMINE SULFATE AND AMPHETAMINE SULFATE 5; 5; 5; 5 MG/1; MG/1; MG/1; MG/1
20 TABLET ORAL 2 TIMES DAILY
Qty: 60 TABLET | Refills: 0 | Status: SHIPPED | OUTPATIENT
Start: 2019-07-18 | End: 2019-08-17

## 2019-09-16 DIAGNOSIS — F98.8 ATTENTION DEFICIT DISORDER (ADD) WITHOUT HYPERACTIVITY: Primary | ICD-10-CM

## 2019-09-16 NOTE — TELEPHONE ENCOUNTER
Requested Prescriptions   Pending Prescriptions Disp Refills     amphetamine-dextroamphetamine (ADDERALL) 20 MG tablet [Pharmacy Med Name: AMPHETAMINE-DEXTROAMPHETAMI 20 TABS] 60 tablet 0     Sig: TAKE ONE TABLET BY MOUTH TWICE A DAY (FILL ON 8.16.19)       There is no refill protocol information for this order        Last Written Prescription Date:  7/18/19  Last Fill Quantity: 60,  # refills: 0   Last office visit: 6/17/2019 with prescribing provider:  Fermín Larios PA-C   Future Office Visit:

## 2019-09-17 RX ORDER — DEXTROAMPHETAMINE SACCHARATE, AMPHETAMINE ASPARTATE, DEXTROAMPHETAMINE SULFATE AND AMPHETAMINE SULFATE 5; 5; 5; 5 MG/1; MG/1; MG/1; MG/1
TABLET ORAL
Qty: 60 TABLET | Refills: 0 | Status: SHIPPED | OUTPATIENT
Start: 2019-09-17 | End: 2020-03-19

## 2019-09-17 NOTE — TELEPHONE ENCOUNTER
Refill request was put in yesterday, can this be done ASAP? Pt is out of medication today.   Cari Dias MA

## 2019-09-17 NOTE — TELEPHONE ENCOUNTER
Pt called to check the status of this Rx, and was informed it was still in process. She was curious if the provider was out of the office. Advised that PCP's schedule was unavailable until 10/1. Pt states that she needs to start this medication today (9/17). Please contact her to advise if this cannot be refilled today.     Pt can be reached at: [phone]  Thank you!    Rebecca Alas on 9/17/2019 at 1:13 PM

## 2019-10-10 NOTE — PROGRESS NOTES
Subjective     Joanne Padilla is a 59 year old female who presents to clinic today for the following health issues:    HPI   Medication Followup of Adderall     Taking Medication as prescribed: yes    Side Effects:  None    Medication Helping Symptoms:  Somewhat effective      Joanne presents today to discuss a number of issues    ADDERALL  She takes 20mg twice daily   Continues to tolerate this  Still not sleeping well; only around 2-3 broken hours nightly  Sinus symptoms contributory  She is not sleeping because she has other things she needs to get done    Patient would like Cyst on left shoulder blade looked at, states that it improved after antibiotics but is still itching and irritation.  She also notes a new rash in arm pits; is mildly itchy; red; since then stopped shaving and using deodorant; also similar rash in between the breast tissue; not itchy  Does think these have been present around one month        Patient would like to discuss sinus issues she had had for more then one month.   States that she has had congestion, ear pressure, headaches, and watery eyes.  Draining at night  Cheeks feel painful  She has not been using flonase (some bloody nose) and zyrtec    Patient Active Problem List   Diagnosis     Tobacco abuse     Chronic rhinitis     Atopic dermatitis     Migraines     Allergic rhinitis     Past Surgical History:   Procedure Laterality Date      SECTION       COLONOSCOPY       DAVINCI LAPAROSCOPIC CHOLECYSTECTOMY WITHOUT GRAMS  2013    Procedure: DAVINCI LAPAROSCOPIC CHOLECYSTECTOMY WITHOUT GRAMS;  DAVINCI CHOLECYSTECTOMY;  Surgeon: Jac Stallings MD;  Location: [facility]     GYN SURGERY      repair of fallopian tubes and ovaries     LUMPECTOMY BREAST       LUMPECTOMY BREAST       NOSE SURGERY         Social History     Tobacco Use     Smoking status: Current Every Day Smoker     Packs/day: 0.50     Years: 15.00     Pack years: 7.50     Types: Cigarettes     Smokeless  tobacco: Never Used     Tobacco comment: Has not been smoking lately but has not quit, [place] plan -13    Substance Use Topics     Alcohol use: No     Family History   Problem Relation Age of Onset     C.A.D. Mother         MI early 50s;  59 heart problems     C.A.D. Father            Reviewed and updated as needed this visit by Provider         Review of Systems   ROS COMP: Constitutional, HEENT, cardiovascular, pulmonary, gi and gu systems are negative, except as otherwise noted.      Objective    /78 (BP Location: Right arm, Patient Position: Chair, Cuff Size: Adult Regular)   Pulse 87   Temp 98.1  F (36.7  C) (Tympanic)   Resp 18   LMP  (LMP Unknown)   SpO2 99%   There is no height or weight on file to calculate BMI.  Physical Exam   GENERAL: healthy, no distress; appears tired  EYES: Eyes grossly normal to inspection; there is some ectropion and scleral injection  HENT: normal cephalic/atraumatic, both ears: clear effusion, nasal mucosa edematous , oropharynx clear, oral mucous membranes moist and sinuses: maxillary tenderness on both sides  RESP: lungs clear to auscultation - no rales, rhonchi or wheezes  CV: regular rates and rhythm  PSYCH: mentation appears normal  SKIN: there is a healed cyst along the left upper scapula; central punctum remains. No active discharge. In the b/l axilla are small erythematous patches. Similar lesion to the sternal space    Diagnostic Test Results:  Labs reviewed in Epic        Assessment & Plan     1. Attention deficit disorder (ADD) without hyperactivity  Refilling. I reviewed with her again my recommendation to improve sleep habits, see sleep therapy. I think a significant portion of her symptoms are related to her limited (2-3 hours) of sleep. She continues to kick this can down the road. Plan to revisit  - amphetamine-dextroamphetamine (ADDERALL) 20 MG tablet; Take 1 tablet (20 mg) by mouth 2 times daily  Dispense: 60 tablet; Refill: 0    2. Mental  exhaustion  See above    3. Chronic rhinitis  4. Acute rhinosinusitis  Nose bleeds with flonase. She will restart zyrtec. Start below   - amoxicillin-clavulanate (AUGMENTIN) 875-125 MG tablet; Take 1 tablet by mouth 2 times daily for 10 days  Dispense: 20 tablet; Refill: 0    5. Intertrigo  Trial of antifungal with some otc steroid over the top. Follow up if not improving       Tobacco Cessation:   reports that she has been smoking cigarettes. She has a 7.50 pack-year smoking history. She has never used smokeless tobacco.  Tobacco Cessation Action Plan: Information offered: Patient not interested at this time        Return in about 3 months (around 1/11/2020) for Physical Exam.    Fermín Larios PA-C  [facility]

## 2019-10-11 ENCOUNTER — OFFICE VISIT (OUTPATIENT)
Dept: FAMILY MEDICINE | Facility: CLINIC | Age: 59
End: 2019-10-11

## 2019-10-11 VITALS
TEMPERATURE: 98.1 F | SYSTOLIC BLOOD PRESSURE: 110 MMHG | RESPIRATION RATE: 18 BRPM | DIASTOLIC BLOOD PRESSURE: 78 MMHG | OXYGEN SATURATION: 99 % | HEART RATE: 87 BPM

## 2019-10-11 DIAGNOSIS — J31.0 CHRONIC RHINITIS: ICD-10-CM

## 2019-10-11 DIAGNOSIS — J01.90 ACUTE RHINOSINUSITIS: ICD-10-CM

## 2019-10-11 DIAGNOSIS — L30.4 INTERTRIGO: ICD-10-CM

## 2019-10-11 DIAGNOSIS — F98.8 ATTENTION DEFICIT DISORDER (ADD) WITHOUT HYPERACTIVITY: Primary | ICD-10-CM

## 2019-10-11 DIAGNOSIS — F48.8 MENTAL EXHAUSTION: ICD-10-CM

## 2019-10-11 PROCEDURE — 99214 OFFICE O/P EST MOD 30 MIN: CPT | Performed by: PHYSICIAN ASSISTANT

## 2019-10-11 RX ORDER — DEXTROAMPHETAMINE SACCHARATE, AMPHETAMINE ASPARTATE, DEXTROAMPHETAMINE SULFATE AND AMPHETAMINE SULFATE 5; 5; 5; 5 MG/1; MG/1; MG/1; MG/1
20 TABLET ORAL 2 TIMES DAILY
Qty: 60 TABLET | Refills: 0 | Status: SHIPPED | OUTPATIENT
Start: 2019-10-11 | End: 2019-11-18

## 2019-11-18 DIAGNOSIS — F98.8 ATTENTION DEFICIT DISORDER (ADD) WITHOUT HYPERACTIVITY: ICD-10-CM

## 2019-11-19 RX ORDER — DEXTROAMPHETAMINE SACCHARATE, AMPHETAMINE ASPARTATE, DEXTROAMPHETAMINE SULFATE AND AMPHETAMINE SULFATE 5; 5; 5; 5 MG/1; MG/1; MG/1; MG/1
TABLET ORAL
Qty: 60 TABLET | Refills: 0 | Status: SHIPPED | OUTPATIENT
Start: 2019-11-19 | End: 2019-12-19

## 2019-11-19 NOTE — TELEPHONE ENCOUNTER
Pt called to check status of the request, states she needs it by the end of the day to be able to start dosage for tomorrow. She is completely out of Rx. Please contact if there are questions or concerns.    Rebecca Alas on 11/19/2019 at 2:56 PM

## 2019-11-19 NOTE — TELEPHONE ENCOUNTER
Requested Prescriptions   Pending Prescriptions Disp Refills     amphetamine-dextroamphetamine (ADDERALL) 20 MG tablet [Pharmacy Med Name: AMPHETAMINE SALTS 20MG TAB] 60 tablet 0     Sig: TAKE ONE TABLET BY MOUTH TWICE A DAY   Last Written Prescription Date:  10/11/19  Last Fill Quantity: 60,  # refills: 0   Last office visit: 10/11/2019 with prescribing provider:  Fermín Larios PA-C    Future Office Visit:        There is no refill protocol information for this order

## 2019-11-19 NOTE — TELEPHONE ENCOUNTER
Routing refill request to provider for review/approval because:  Drug not on the [facility] refill protocol     Controlled Substance Refill Request for Adderall   Problem List Complete:  No     PROVIDER TO CONSIDER COMPLETION OF PROBLEM LIST AND OVERVIEW/CONTROLLED SUBSTANCE AGREEMENT    Last Written Prescription Date:  10/16/19  Last Fill Quantity:30   # refills: 0      Last Office Visit with [facility] primary care provider: 10/11/19    Future Office visit:     Controlled substance agreement:   Encounter-Level CSA:    There are no encounter-level csa.     Patient-Level CSA:    There are no patient-level csa.         Last Urine Drug Screen: No results found for: CDAUT, No results found for: COMDAT, No results found for: THC13, PCP13, COC13, MAMP13, OPI13, AMP13, BZO13, TCA13, MTD13, BAR13, OXY13, PPX13, BUP13     Processing:  Rx to be electronically transmitted to pharmacy by provider      [email or web address]       checked in past 3 months?  Yes 11/19/19      [place] reviewed:  reviewed- no concerns  Filled   10/16/19  9/18/19  8/16/19    [place]:  [email or web address]    Tonya Zeng RN Flex

## 2019-12-15 ENCOUNTER — HEALTH MAINTENANCE LETTER (OUTPATIENT)
Age: 59
End: 2019-12-15

## 2019-12-18 DIAGNOSIS — F98.8 ATTENTION DEFICIT DISORDER (ADD) WITHOUT HYPERACTIVITY: ICD-10-CM

## 2019-12-19 RX ORDER — DEXTROAMPHETAMINE SACCHARATE, AMPHETAMINE ASPARTATE, DEXTROAMPHETAMINE SULFATE AND AMPHETAMINE SULFATE 5; 5; 5; 5 MG/1; MG/1; MG/1; MG/1
TABLET ORAL
Qty: 60 TABLET | Refills: 0 | Status: SHIPPED | OUTPATIENT
Start: 2019-12-19 | End: 2020-01-17

## 2019-12-19 NOTE — TELEPHONE ENCOUNTER
Requested Prescriptions   Pending Prescriptions Disp Refills     amphetamine-dextroamphetamine (ADDERALL) 20 MG tablet [Pharmacy Med Name: AMPHETAMINE SALTS 20MG TAB] 60 tablet 0     Sig: TAKE ONE TABLET BY MOUTH TWICE A DAY       There is no refill protocol information for this order        .Last Written Prescription Date:  11/19/19  Last Fill Quantity: 60,  # refills: 0   Last office visit: 10/11/2019 with prescribing provider:     Future Office Visit:

## 2020-01-17 DIAGNOSIS — F98.8 ATTENTION DEFICIT DISORDER (ADD) WITHOUT HYPERACTIVITY: ICD-10-CM

## 2020-01-17 RX ORDER — DEXTROAMPHETAMINE SACCHARATE, AMPHETAMINE ASPARTATE, DEXTROAMPHETAMINE SULFATE AND AMPHETAMINE SULFATE 5; 5; 5; 5 MG/1; MG/1; MG/1; MG/1
TABLET ORAL
Qty: 60 TABLET | Refills: 0 | Status: SHIPPED | OUTPATIENT
Start: 2020-01-17 | End: 2020-02-18

## 2020-01-17 NOTE — TELEPHONE ENCOUNTER
Pt called stating the Pharmacy directed her to check in with PCP to see if this could be refilled as soon as possible. Pt will be out of Rx on Sunday and the pharmacy is closed. Please contact to discuss and advise.    Rebecca Alas on 1/17/2020 at 3:17 PM

## 2020-01-17 NOTE — TELEPHONE ENCOUNTER
Adderall 20 mg    Last Written Prescription Date:  12/19/2019  Last Fill Quantity: 60,  # refills: 0   Last office visit: 10/11/2019 with prescribing provider:    Future Office Visit:        Last 4 [place] per [place] ;    12/20/2019, 11/19/2019, 10/15/2019, 9/17/2019 qty 60. Prescriptions per DM or RM provider.      See previous message.    Radha Arguello RN

## 2020-02-18 DIAGNOSIS — F98.8 ATTENTION DEFICIT DISORDER (ADD) WITHOUT HYPERACTIVITY: ICD-10-CM

## 2020-02-18 RX ORDER — DEXTROAMPHETAMINE SACCHARATE, AMPHETAMINE ASPARTATE, DEXTROAMPHETAMINE SULFATE AND AMPHETAMINE SULFATE 5; 5; 5; 5 MG/1; MG/1; MG/1; MG/1
TABLET ORAL
Qty: 60 TABLET | Refills: 0 | Status: SHIPPED | OUTPATIENT
Start: 2020-02-18 | End: 2020-02-24

## 2020-02-18 NOTE — TELEPHONE ENCOUNTER
Adderall 20 mg       Last Written Prescription Date:  1/17/2020  Last Fill Quantity: 60,   # refills: 0  Last Office Visit: 10/11/19  Future Office visit:    Next 5 appointments (look out 90 days)    Feb 24, 2020  9:00 AM CST  SHORT with Fermín Larios PA-C  [facility] ([address] [address] [phone]           Routing refill request to provider for review/approval because:  Drug not on the [facility], [facility] or [facility] refill protocol or controlled substance    :   1/17, 12/20, 11/19    Cesilia VAZQUEZ RN

## 2020-02-24 ENCOUNTER — OFFICE VISIT (OUTPATIENT)
Dept: FAMILY MEDICINE | Facility: CLINIC | Age: 60
End: 2020-02-24
Payer: COMMERCIAL

## 2020-02-24 VITALS
TEMPERATURE: 98.1 F | BODY MASS INDEX: 30.59 KG/M2 | HEIGHT: 67 IN | HEART RATE: 81 BPM | SYSTOLIC BLOOD PRESSURE: 108 MMHG | RESPIRATION RATE: 17 BRPM | OXYGEN SATURATION: 99 % | WEIGHT: 194.9 LBS | DIASTOLIC BLOOD PRESSURE: 78 MMHG

## 2020-02-24 DIAGNOSIS — M79.89 REDNESS AND SWELLING OF HAND: ICD-10-CM

## 2020-02-24 DIAGNOSIS — R23.8 REDNESS AND SWELLING OF HAND: ICD-10-CM

## 2020-02-24 DIAGNOSIS — R21 SKIN RASH: Primary | ICD-10-CM

## 2020-02-24 DIAGNOSIS — J32.9 CHRONIC SINUSITIS, UNSPECIFIED LOCATION: ICD-10-CM

## 2020-02-24 LAB
BASOPHILS # BLD AUTO: 0 10E9/L (ref 0–0.2)
BASOPHILS NFR BLD AUTO: 0.7 %
DIFFERENTIAL METHOD BLD: ABNORMAL
EOSINOPHIL # BLD AUTO: 0.2 10E9/L (ref 0–0.7)
EOSINOPHIL NFR BLD AUTO: 5.3 %
ERYTHROCYTE [DISTWIDTH] IN BLOOD BY AUTOMATED COUNT: 13.9 % (ref 10–15)
ERYTHROCYTE [SEDIMENTATION RATE] IN BLOOD BY WESTERGREN METHOD: 7 MM/H (ref 0–30)
HCT VFR BLD AUTO: 42.7 % (ref 35–47)
HGB BLD-MCNC: 14.1 G/DL (ref 11.7–15.7)
LYMPHOCYTES # BLD AUTO: 2.5 10E9/L (ref 0.8–5.3)
LYMPHOCYTES NFR BLD AUTO: 60.4 %
MCH RBC QN AUTO: 30.2 PG (ref 26.5–33)
MCHC RBC AUTO-ENTMCNC: 33 G/DL (ref 31.5–36.5)
MCV RBC AUTO: 91 FL (ref 78–100)
MONOCYTES # BLD AUTO: 0.4 10E9/L (ref 0–1.3)
MONOCYTES NFR BLD AUTO: 9.7 %
NEUTROPHILS # BLD AUTO: 1 10E9/L (ref 1.6–8.3)
NEUTROPHILS NFR BLD AUTO: 23.9 %
PLATELET # BLD AUTO: 160 10E9/L (ref 150–450)
RBC # BLD AUTO: 4.67 10E12/L (ref 3.8–5.2)
WBC # BLD AUTO: 4.1 10E9/L (ref 4–11)

## 2020-02-24 PROCEDURE — 80050 GENERAL HEALTH PANEL: CPT | Performed by: PHYSICIAN ASSISTANT

## 2020-02-24 PROCEDURE — 86140 C-REACTIVE PROTEIN: CPT | Performed by: PHYSICIAN ASSISTANT

## 2020-02-24 PROCEDURE — 99214 OFFICE O/P EST MOD 30 MIN: CPT | Performed by: PHYSICIAN ASSISTANT

## 2020-02-24 PROCEDURE — 85652 RBC SED RATE AUTOMATED: CPT | Performed by: PHYSICIAN ASSISTANT

## 2020-02-24 PROCEDURE — 36415 COLL VENOUS BLD VENIPUNCTURE: CPT | Performed by: PHYSICIAN ASSISTANT

## 2020-02-24 RX ORDER — IPRATROPIUM BROMIDE 42 UG/1
2 SPRAY, METERED NASAL 4 TIMES DAILY
Qty: 15 ML | Refills: 0 | Status: SHIPPED | OUTPATIENT
Start: 2020-02-24 | End: 2021-03-03

## 2020-02-24 ASSESSMENT — MIFFLIN-ST. JEOR: SCORE: 1501.69

## 2020-02-24 NOTE — PROGRESS NOTES
"Subjective     Joanne Padilla is a 59 year old female who presents to clinic today for the following health issues:    HPI   RESPIRATORY SYMPTOMS      Duration: has been ongoing for the past several years but symptoms worse over the past 2 weeks     Description    nasal congestion, rhinorrhea, sore throat, facial pain/pressure, cough, wheezing, chills, ear pain bilateral, headache, fatigue/malaise, hoarse voice,    Severity: severe    Accompanying signs and symptoms: None    History (predisposing factors):  May be exposure to allergens that are in her apartment building where she lives.  Notes increased dust and feels it may be causing some of her symptoms.     Trouble with decrease in smell and taste.      Used to get allergy shots    Precipitating or alleviating factors: None    Therapies tried and outcome:  oral decongestant antihistamine nasal spray/wash - Saline     Has had some dryness and so just uses the Saline.     Joanne Padilla is a 59 year old female who presents today for a constellation of symptoms; many are not new  She is experiencing constant sinus pressure; blowing out a lot of discharge of varying colors  Does have some frontal HA  Nose bleeds following fonase use  Eyes are red and itchy      Getting itchy lesions on skin   -lesions on the extremities and onto trunk; chest   They do seem to come an go  Scab like  Never blisters    Hands seem to be swelling off/on - feels \"almost like they are going to split\"  -also more red than normal   -no pain  -denies cold sensitivity    Continues to feel chronically fatigued  Memory is poor  Sleeping only 2-3 hours/night \"to get things done\"  Denies mood concerns    Patient Active Problem List   Diagnosis     Tobacco abuse     Chronic rhinitis     Atopic dermatitis     Migraines     Allergic rhinitis     Exhaustion due to excessive exertion(994.5)     Past Surgical History:   Procedure Laterality Date      SECTION       COLONOSCOPY       " "DAVINCI LAPAROSCOPIC CHOLECYSTECTOMY WITHOUT GRAMS  2013    Procedure: DAVINCI LAPAROSCOPIC CHOLECYSTECTOMY WITHOUT GRAMS;  DAVINCI CHOLECYSTECTOMY;  Surgeon: Jac Stallings MD;  Location: [facility] OR     GYN SURGERY      repair of fallopian tubes and ovaries     LUMPECTOMY BREAST       LUMPECTOMY BREAST       NOSE SURGERY         Social History     Tobacco Use     Smoking status: Current Every Day Smoker     Packs/day: 0.50     Years: 15.00     Pack years: 7.50     Types: Cigarettes     Smokeless tobacco: Never Used     Tobacco comment: Has not been smoking lately but has not quit, [place] quit plan -13    Substance Use Topics     Alcohol use: No     Family History   Problem Relation Age of Onset     C.A.D. Mother         MI early 50s;  59 heart problems     C.A.D. Father          Reviewed and updated as needed this visit by Provider         Review of Systems   ROS COMP: Constitutional, HEENT, cardiovascular, pulmonary, gi and gu systems are negative, except as otherwise noted.      Objective    /78   Pulse 81   Temp 98.1  F (36.7  C) (Oral)   Resp 17   Ht 1.702 m (5' 7\")   Wt 88.4 kg (194 lb 14.4 oz)   SpO2 99%   BMI 30.53 kg/m    Body mass index is 30.53 kg/m .  Physical Exam   GENERAL: healthy, alert and no distress  EYES: mildly proptotic; there is some ectropion bilaterally; some scleral injection  HENT: normal cephalic/atraumatic, ear canals and TM's normal, nasal mucosa edematous , rhinorrhea clear, oropharynx clear and sinuses: maxillary, frontal tenderness on bilateral  NECK: no adenopathy and thyroid normal to palpation  RESP: lungs clear to auscultation - no rales, rhonchi or wheezes  CV: regular rates and rhythm, normal S1 S2, no S3 or S4 and no murmur, click or rub  ABDOMEN: soft, nontender, no hepatosplenomegaly, no masses and bowel sounds normal  MS: the hands bilaterally are erythematous without overt temperature change. Radial pulses are adequate. There is some pale color change " "to the distal digits. She is able to fully flex/extend the hand/digits  SKIN: small scabbed lesions scattered along extremities and upperback/chest  PSYCH: mentation appears normal, affect normal/bright    Diagnostic Test Results:  Labs reviewed in Epic  See A/p        Assessment & Plan     1. Skin rash  She has had this previously and did not accept referral. This may be related to allergies but would prefer derm eval given duration  - DERMATOLOGY REFERRAL    2. Chronic sinusitis, unspecified location  Start below. Will recommend consult with allergist prior to ENT  - amoxicillin-clavulanate (AUGMENTIN) 875-125 MG tablet; Take 1 tablet by mouth 2 times daily  Dispense: 20 tablet; Refill: 0  - ipratropium (ATROVENT) 0.06 % nasal spray; Spray 2 sprays into both nostrils 4 times daily  Dispense: 15 mL; Refill: 0    3. Redness and swelling of hand  Reynauds phenomenon? Somewhat similar in appearance but without the pain and cold sensitivity. Screening labs below for rule out. Will need to consider reduction of stimulant and of course smoking if this is truly dx  - CBC with platelets and differential  - TSH with free T4 reflex  - Comprehensive metabolic panel  - CRP inflammation  - Erythrocyte sedimentation rate auto     BMI:   Estimated body mass index is 30.53 kg/m  as calculated from the following:    Height as of this encounter: 1.702 m (5' 7\").    Weight as of this encounter: 88.4 kg (194 lb 14.4 oz).   Weight management plan: Discussed healthy diet and exercise guidelines    >50 % of the 25 minutes was spent in face to face counselling or coordination of care for the above issues.      Return in about 4 weeks (around 3/23/2020) for Recheck of symptoms .    Fermín Larios PA-C  [facility]      "

## 2020-02-24 NOTE — PATIENT INSTRUCTIONS
FHN: [facility] [place] [facility] [phone]   [email or web address]  FHN: [facility] [phone]   [email or web address]

## 2020-02-25 LAB
ALBUMIN SERPL-MCNC: 3.4 G/DL (ref 3.4–5)
ALP SERPL-CCNC: 105 U/L (ref 40–150)
ALT SERPL W P-5'-P-CCNC: 25 U/L (ref 0–50)
ANION GAP SERPL CALCULATED.3IONS-SCNC: 10 MMOL/L (ref 3–14)
AST SERPL W P-5'-P-CCNC: 20 U/L (ref 0–45)
BILIRUB SERPL-MCNC: 0.3 MG/DL (ref 0.2–1.3)
BUN SERPL-MCNC: 18 MG/DL (ref 7–30)
CALCIUM SERPL-MCNC: 8.8 MG/DL (ref 8.5–10.1)
CHLORIDE SERPL-SCNC: 112 MMOL/L (ref 94–109)
CO2 SERPL-SCNC: 18 MMOL/L (ref 20–32)
CREAT SERPL-MCNC: 0.56 MG/DL (ref 0.52–1.04)
CRP SERPL-MCNC: <2.9 MG/L (ref 0–8)
GFR SERPL CREATININE-BSD FRML MDRD: >90 ML/MIN/{1.73_M2}
GLUCOSE SERPL-MCNC: 85 MG/DL (ref 70–99)
POTASSIUM SERPL-SCNC: 4.7 MMOL/L (ref 3.4–5.3)
PROT SERPL-MCNC: 6.9 G/DL (ref 6.8–8.8)
SODIUM SERPL-SCNC: 140 MMOL/L (ref 133–144)
TSH SERPL DL<=0.005 MIU/L-ACNC: 2.69 MU/L (ref 0.4–4)

## 2020-03-13 DIAGNOSIS — F98.8 ATTENTION DEFICIT DISORDER (ADD) WITHOUT HYPERACTIVITY: ICD-10-CM

## 2020-03-13 NOTE — TELEPHONE ENCOUNTER
Requested Prescriptions   Pending Prescriptions Disp Refills     amphetamine-dextroamphetamine (ADDERALL) 20 MG tablet [Pharmacy Med Name: AMPHETAMINE SALTS 20MG TAB] 60 tablet 0     Sig: TAKE ONE TABLET BY MOUTH TWICE A DAY   Last Written Prescription Date:  9/17/19  Last Fill Quantity: 60,  # refills: 0   Last office visit: 2/24/2020 with prescribing provider:  Fermín Larios PA-C   Future Office Visit:        There is no refill protocol information for this order

## 2020-03-17 NOTE — TELEPHONE ENCOUNTER
Routing refill request to provider for review/approval because:  Drug not on the [facility], [facility] or [facility] refill protocol or controlled substance    : 2/18, 1/17, 12/20    Cesilia VAZQUEZ RN

## 2020-03-19 RX ORDER — DEXTROAMPHETAMINE SACCHARATE, AMPHETAMINE ASPARTATE, DEXTROAMPHETAMINE SULFATE AND AMPHETAMINE SULFATE 5; 5; 5; 5 MG/1; MG/1; MG/1; MG/1
TABLET ORAL
Qty: 60 TABLET | Refills: 0 | Status: SHIPPED | OUTPATIENT
Start: 2020-03-19 | End: 2020-04-17

## 2020-04-09 ENCOUNTER — NURSE TRIAGE (OUTPATIENT)
Dept: FAMILY MEDICINE | Facility: CLINIC | Age: 60
End: 2020-04-09

## 2020-04-09 ENCOUNTER — VIRTUAL VISIT (OUTPATIENT)
Dept: FAMILY MEDICINE | Facility: OTHER | Age: 60
End: 2020-04-09

## 2020-04-09 NOTE — TELEPHONE ENCOUNTER
Reason for call:  Symptom   Symptom or request: nausea, diarrhea, congestion, fatigue, no fever.     Duration (how long have symptoms been present): 2 days.  Have you been treated for this before? No    Additional comments: none    Phone number to reach patient:  Cell number on file:    Telephone Information:   Mobile [phone]       Best Time:  any    Can we leave a detailed message on this number?  YES    Travel screening: Not Applicable     Joanne Tong on 4/9/2020 at 11:43 AM

## 2020-04-09 NOTE — TELEPHONE ENCOUNTER
"Patient states worst symptoms for last few days has been fatigue. Nauseous but no appetite and not throwing up, no dry heaves. She has chills and cold sweats but but no fever. Has had cough and SOB but has chalked it up to allergies.     Reason for Disposition    [1] COVID-19 suspected (e.g., cough, fever, shortness of breath) AND [2] public health department recommends testing    Additional Information    Negative: SEVERE difficulty breathing (e.g., struggling for each breath, speaks in single words)    Negative: Difficult to awaken or acting confused (e.g., disoriented, slurred speech)    Negative: Bluish (or gray) lips or face now    Negative: Shock suspected (e.g., cold/pale/clammy skin, too weak to stand, low BP, rapid pulse)    Negative: Sounds like a life-threatening emergency to the triager    Answer Assessment - Initial Assessment Questions  1. COVID-19 DIAGNOSIS: \"Who made your Coronavirus (COVID-19) diagnosis?\" \"Was it confirmed by a positive lab test?\" If not diagnosed by a HCP, ask \"Are there lots of cases (community spread) where you live?\" (See public health department website, if unsure)    * MAJOR community spread: high number of cases; numbers of cases are increasing; many people hospitalized.    * MINOR community spread: low number of cases; not increasing; few or no people hospitalized      undiagnosed  2. ONSET: \"When did the COVID-19 symptoms start?\"       2 days ago  3. WORST SYMPTOM: \"What is your worst symptom?\" (e.g., cough, fever, shortness of breath, muscle aches)      Fatigue, sweat, chills, body aches  4. COUGH: \"How bad is the cough?\"        Not bacck  5. FEVER: \"Do you have a fever?\" If so, ask: \"What is your temperature, how was it measured, and when did it start?\"      No fever, but experiencing chills  6. RESPIRATORY STATUS: \"Describe your breathing?\" (e.g., shortness of breath, wheezing, unable to speak)       Wheezing - using son's inhaler, chest congestion  7. BETTER-SAME-WORSE: " "\"Are you getting better, staying the same or getting worse compared to yesterday?\"  If getting worse, ask, \"In what way?\"      Today she felt worse  8. HIGH RISK DISEASE: \"Do you have any chronic medical problems?\" (e.g., asthma, heart or lung disease, weak immune system, etc.)      Allergies, smoker  9. PREGNANCY: \"Is there any chance you are pregnant?\" \"When was your last menstrual period?\"      no  10. OTHER SYMPTOMS: \"Do you have any other symptoms?\"  (e.g., runny nose, headache, sore throat, loss of smell)        Headache, sore throat and runny nose and nausea    [facility] Specific Disposition  - REQUIRED: [facility] Specific Patient Instructions  COVID 19 Nurse Triage Plan/Patient Instructions    Please be aware that novel coronavirus (COVID-19) may be circulating in the community. If you develop symptoms such as fever, cough, or SOB or if you have concerns about the presence of another infection including coronavirus (COVID-19), please contact your health care provider or visit [email or web address].       Patient to have an OnCare Visit with a provider (Preferred option). Follow System Ambulatory Workflow for COVID 19.     To do this follow these instructions:    1. Go to the website [email or web address]  2. Create an account (you will need your insurance information)  3. Start a new visit  4. Choose your diagnosis (e.g. COVID19)  5. Fill out the information about your symptoms  6. A provider will reach out to you by text, phone call or video visit based on your request    Call Back If: Your symptoms worsen before you are able to complete your OnCare Visit with a provider.    Thank you for limiting contact with others, wearing a simple mask to cover your cough, practice good hand hygiene habits and accessing our virtual services where possible to limit the spread of this virus.    For more information about COVID19 and options for caring for yourself at home, please visit the CDC website at " https://www.cdc.gov/coronavirus/2019-ncov/about/steps-when-sick.html  For more options for care at [facility], please visit our website at [email or web address]    For more information, please use the [place] Department of Health ([facility]) COVID-19 Hotlines (Interpreters available):   Health questions: Phone Number: [phone] or [phone] and Hours: 7 a.m. to 7 p.m.  Schools and  questions: Phone Number: [phone] or [phone] and Hours 7 a.m. to 7 p.m.    Protocols used: CORONAVIRUS (COVID-19) DIAGNOSED OR ENBTCZFMO-S-OP 3.30.20

## 2020-04-09 NOTE — PROGRESS NOTES
"Date: 2020 16:33:04  Clinician: Grazyna Eddy  Clinician NPI: [id number]  Patient: Joanne Padilla  Patient : 1960  Patient Address: [address] [place] [address] [address] [place] [id number]  Patient Phone: [phone]  Visit Protocol: URI  Patient Summary:  Joanne is a 59 year old ( : 1960 ) female who initiated a Visit for COVID-19 (Coronavirus) evaluation and screening. When asked the question \"Please sign me up to receive news, health information and promotions. \", Joanne responded \"Yes\".    Joanne states her symptoms started 1-2 days ago.   Her symptoms consist of rhinitis, facial pain or pressure, myalgia, a sore throat, a cough, nasal congestion, malaise, chills, diarrhea, nausea, a headache, and wheezing. She is experiencing mild difficulty breathing with activities but can speak normally in full sentences. Joanne also feels feverish.   Symptom details     Nasal secretions: The color of her mucus is white.    Cough: Joanne coughs a few times an hour and her cough is more bothersome at night. Phlegm comes into her throat when she coughs. She believes her cough is caused by post-nasal drip. The color of the phlegm is white.     Sore throat: Joanne reports having mild throat pain (1-3 on a 10 point pain scale), does not have exudate on her tonsils, and can swallow liquids. She is not sure if the lymph nodes in her neck are enlarged. A rash has not appeared on the skin since the sore throat started.     Temperature: Her current temperature is 97.2 degrees Fahrenheit.     Wheezing: Joanne has been diagnosed with asthma. The wheezing does not interfere with her normal daily activities.    Facial pain or pressure: The facial pain or pressure does not feel worse when bending or leaning forward.     Headache: She states the headache is moderate (4-6 on a 10 point pain scale).      Joanne denies having ear pain, vomiting, and teeth pain. She also denies taking antibiotic medication for the " symptoms and having recent facial or sinus surgery in the past 60 days.   Precipitating events  Within the past week, Joanne has not been exposed to someone with strep throat. She has not recently been exposed to someone with influenza. Joanne has been in close contact with the following high risk individuals: people with asthma, heart disease or diabetes.   Pertinent COVID-19 (Coronavirus) information  Joanne has not traveled internationally or to the areas where COVID-19 (Coronavirus) is widespread, including cruise ship travel in the last 14 days before the start of her symptoms.   Joanne has not had a close contact with a laboratory-confirmed COVID-19 patient within 14 days of symptom onset. She also has not had a close contact with a suspected COVID-19 patient within 14 days of symptom onset.   Joanne does not work or volunteer as healthcare worker or a  and does not work or volunteer in a healthcare facility. She does not live with a healthcare worker.   Pertinent medical history  Joanne had 3 sinus infections within the past year.   Joanne does not get yeast infections when she takes antibiotics.   Joanne needs a return to work/school note.   Weight: 185 lbs   Joanne smokes or uses smokeless tobacco.   Additional information as reported by the patient (free text): Some of the symptoms I am experiencing that were not included in the previous questions:  I am exhausted to the point where I felt like going back to bed shortly after waking up, I have body aches and chills, I am nauseous, I have diarrhea, and when I woke up this morning I was drenched in sweat.   Weight: 185 lbs    MEDICATIONS: vitamin A-vitamin D3-vitamin E-vitamin K oral, Complete Multivitamin-Multimineral oral, ALLERGIES: NKDA  Clinician Response:  Dear Joanne,   Based on the information you have provided, you do have symptoms that are consistent with Coronavirus (COVID-19).  The coronavirus causes mild to  severe respiratory illness with the most common symptoms including fever, cough and difficulty breathing. Unfortunately, many viruses cause similar symptoms and it can be difficult to distinguish between viruses, especially in mild cases, so we are presuming that anyone with cough or fever has coronavirus at this time.  Coronavirus/COVID-19 has reached the point of community spread in [place], meaning that we are finding the virus in people with no known exposure risk for ronny the virus. Given the increasing commonness of coronavirus in the community we are no longer testing patients who are not critically ill.  If you are a health care worker, you should refer to your employee health office for instructions about testing and returning to work.  For everyone else who has cough or fever, you should assume you are infected with coronavirus. Since you will not be tested but have symptoms that may be consistent with coronavirus, the CDC recommends you stay in self-isolation until these three things have happened:    You have had no fever for at least 72 hours (that is three full days of no fever without the use of medicine that reduces fevers)    AND   Other symptoms have improved (for example, when your cough or shortness of breath have improved)   AND   At least 7 days have passed since your symptoms first appeared.   How to Isolate:   Isolate yourself at home.  Do Not allow any visitors  Do Not go to work or school  Do Not go to Worship,  centers, shopping, or other public places.  Do Not shake hands.  Avoid close contact with others (hugging, kissing).   Protect Others:   Cover Your Mouth and Nose with a mask, disposable tissue or wash cloth to avoid spreading germs to others.  Wash your hands and face frequently with soap and water.   We know it can be scary to hear that you might have COVID-19. Our team can help track your symptoms and make sure you are doing ok over the next two weeks using a  program called [employer] to keep in touch. When you receive an email from [employer], please consider enrolling in our monitoring program. There is no cost to you for monitoring. Here is a URL where you can learn more: [email or web address]  Managing Symptoms:   At this time, we primarily recommend Tylenol (Acetaminophen) for fever or pain. If you have liver or kidney problems, contact your primary care provider for instructions on use of tylenol. Adults can take 650 mg (two 325 mg pills) by mouth every 4-6 hours as needed OR 1,000 mg (two 500 mg pills) every 8 hours as needed. MAXIMUM DAILY DOSE: 3,000mg. For children, refer to dosing on bottle based on age or weight.   If you develop significant shortness of breath that prevents you from doing normal activities, please call 911 or proceed to the nearest emergency room and alert them immediately that you have been in self-isolation for possible coronavirus.  If you have a higher risk medical condition such as cancer, heart failure, end stage renal disease on dialysis or have a transplant, please reach out to your specialist's clinic to advise them of your OnCare visit should you not improve within the next two days.   For more information about COVID19 and options for caring for yourself at home, please visit the CDC website at https://www.cdc.gov/coronavirus/2019-ncov/about/steps-when-sick.htmlFor more options for care at [facility], please visit our website at [email or web address]    Diagnosis: Cough  Diagnosis ICD: R05  Additional Clinician Notes: Given your asthma history, I will prescribe an albuterol inhaler to use every 4-6 hours as needed for cough/wheezing/shortness of breath. If you develop sudden shortness of breath or difficulty breathing you should go to the Emergency Room for further evaluation  Prescription: albuterol sulfate (Ventolin HFA) 90 mcg/actuation inhalation HFA aerosol inhaler 1 60  inhalation canister (ventolin hfa or equivalent), 0 days supply. Inhale 2 puffs every 4 hours as needed. Refills: 0, Refill as needed: no, Allow substitutions: yes  Pharmacy: [facility] - [phone] - [address] [place] [address]

## 2020-04-17 ENCOUNTER — TELEPHONE (OUTPATIENT)
Dept: FAMILY MEDICINE | Facility: CLINIC | Age: 60
End: 2020-04-17

## 2020-04-17 DIAGNOSIS — F98.8 ATTENTION DEFICIT DISORDER (ADD) WITHOUT HYPERACTIVITY: ICD-10-CM

## 2020-04-17 DIAGNOSIS — J30.9 ALLERGIC RHINITIS, UNSPECIFIED SEASONALITY, UNSPECIFIED TRIGGER: Primary | ICD-10-CM

## 2020-04-17 RX ORDER — DEXTROAMPHETAMINE SACCHARATE, AMPHETAMINE ASPARTATE, DEXTROAMPHETAMINE SULFATE AND AMPHETAMINE SULFATE 5; 5; 5; 5 MG/1; MG/1; MG/1; MG/1
TABLET ORAL
Qty: 60 TABLET | Refills: 0 | Status: SHIPPED | OUTPATIENT
Start: 2020-04-17 | End: 2020-05-18

## 2020-04-17 NOTE — TELEPHONE ENCOUNTER
Patient will be out tomorrow.    Last 4 fills per [place] ;    3/19/2020, 2/8/2020, 1/17/2020, 12/20/2019.    Radha Arguello RN

## 2020-04-17 NOTE — TELEPHONE ENCOUNTER
Symptoms  Describe your symptoms:  Did an oncare visit for Covid 19 and has had a sinus infection now  Blood noses but thinks has sinus infection   Any pain: No  How long have you been having symptoms:  3 weeks but 2 days ago for nose bleed   days  Have you been seen for this:  Yes:    Appointment offered?: No  Triage offered?: Yes:  Via phone   Home remedies tried:  Tylenol   Requested Pharmacy:  N/a   Okay to leave a detailed message? Yes at Cell number on file:    Telephone Information:   Mobile [phone]

## 2020-04-17 NOTE — TELEPHONE ENCOUNTER
Patient has had symptoms of facial pain and nose bleeds for over a year. Discussed referral to allergy from 2019. Had not seen yet.    Advised [facility] does video visits and could determine if need to be seen there, imaging and tx.    Patient will contact them for appt.    Referral . Please sign if approved.    Radha Arguello RN

## 2020-04-17 NOTE — TELEPHONE ENCOUNTER
amphetamine-dextroamphetamine (ADDERALL) 20 MG tablet       Last Written Prescription Date:  3/19/2020  Last Fill Quantity: 60,   # refills: 0  Last Office Visit: Ric Johnson Office visit:       Routing refill request to provider for review/approval because:  Drug not on the [facility], P or [facility] refill protocol or controlled substance

## 2020-05-15 DIAGNOSIS — F98.8 ATTENTION DEFICIT DISORDER (ADD) WITHOUT HYPERACTIVITY: ICD-10-CM

## 2020-05-15 NOTE — TELEPHONE ENCOUNTER
RX monitoring program ([place]) reviewed:  reviewed- no concerns    [place]:  [email or web address]    Controlled Substance Refill Request for amphetamine-dextroamphetamine (ADDERALL) 20 MG tablet   Problem List Complete:  Yes   checked in past 3 months?  Yes 4/20/20, 3/19/20, 2/18/20    amphetamine-dextroamphetamine (ADDERALL) 20 MG tablet     Last Written Prescription Date:  4/17/20  Last Fill Quantity: 60,   # refills: 0  Last Office Visit: 2/24/20  Future Office visit:       Routing refill request to provider for review/approval because:  Drug not on the [facility], UMP or  Health refill protocol or controlled substance    Madeline Reina RN on 5/15/2020 at 3:37 PM

## 2020-05-18 RX ORDER — DEXTROAMPHETAMINE SACCHARATE, AMPHETAMINE ASPARTATE, DEXTROAMPHETAMINE SULFATE AND AMPHETAMINE SULFATE 5; 5; 5; 5 MG/1; MG/1; MG/1; MG/1
TABLET ORAL
Qty: 60 TABLET | Refills: 0 | Status: SHIPPED | OUTPATIENT
Start: 2020-05-19 | End: 2020-06-18

## 2020-06-09 ENCOUNTER — OFFICE VISIT (OUTPATIENT)
Dept: URGENT CARE | Facility: URGENT CARE | Age: 60
End: 2020-06-09
Payer: COMMERCIAL

## 2020-06-09 VITALS
DIASTOLIC BLOOD PRESSURE: 66 MMHG | OXYGEN SATURATION: 95 % | HEART RATE: 97 BPM | TEMPERATURE: 97.8 F | SYSTOLIC BLOOD PRESSURE: 100 MMHG

## 2020-06-09 DIAGNOSIS — R30.0 DYSURIA: Primary | ICD-10-CM

## 2020-06-09 LAB
ALBUMIN UR-MCNC: NEGATIVE MG/DL
APPEARANCE UR: CLEAR
BILIRUB UR QL STRIP: NEGATIVE
COLOR UR AUTO: YELLOW
GLUCOSE UR STRIP-MCNC: NEGATIVE MG/DL
HGB UR QL STRIP: ABNORMAL
KETONES UR STRIP-MCNC: NEGATIVE MG/DL
LEUKOCYTE ESTERASE UR QL STRIP: ABNORMAL
NITRATE UR QL: NEGATIVE
NON-SQ EPI CELLS #/AREA URNS LPF: ABNORMAL /LPF
PH UR STRIP: 5.5 PH (ref 5–7)
RBC #/AREA URNS AUTO: ABNORMAL /HPF
SOURCE: ABNORMAL
SP GR UR STRIP: 1.02 (ref 1–1.03)
SPECIMEN SOURCE: NORMAL
UROBILINOGEN UR STRIP-ACNC: 0.2 EU/DL (ref 0.2–1)
WBC #/AREA URNS AUTO: ABNORMAL /HPF
WET PREP SPEC: NORMAL

## 2020-06-09 PROCEDURE — 87086 URINE CULTURE/COLONY COUNT: CPT | Performed by: PHYSICIAN ASSISTANT

## 2020-06-09 PROCEDURE — 99213 OFFICE O/P EST LOW 20 MIN: CPT | Performed by: PHYSICIAN ASSISTANT

## 2020-06-09 PROCEDURE — 87210 SMEAR WET MOUNT SALINE/INK: CPT | Performed by: PHYSICIAN ASSISTANT

## 2020-06-09 PROCEDURE — 81001 URINALYSIS AUTO W/SCOPE: CPT | Performed by: PHYSICIAN ASSISTANT

## 2020-06-09 RX ORDER — SULFAMETHOXAZOLE/TRIMETHOPRIM 800-160 MG
1 TABLET ORAL 2 TIMES DAILY
Qty: 6 TABLET | Refills: 0 | Status: SHIPPED | OUTPATIENT
Start: 2020-06-09 | End: 2020-08-05

## 2020-06-09 RX ORDER — TAMSULOSIN HYDROCHLORIDE 0.4 MG/1
0.4 CAPSULE ORAL DAILY
Qty: 7 CAPSULE | Refills: 0 | Status: SHIPPED | OUTPATIENT
Start: 2020-06-09 | End: 2020-08-05

## 2020-06-09 NOTE — PATIENT INSTRUCTIONS
Patient Education     Flank Pain, Uncertain Cause  The flank is the area between your upper abdomen and your back. Pain there is often caused by a problem with your kidneys. It might be a kidney infection or a kidney stone. Other causes of flank pain include spinal arthritis, a pinched nerve from a back injury, or a back muscle strain or spasm.  The cause of your flank pain is not certain. You may need other tests.  Home care  Follow these tips when caring for yourself at home:    You may use acetaminophen or ibuprofen to control pain, unless your health care provider prescribed another medicine. If you have chronic liver or kidney disease, talk with your provider before taking these medicines. Also talk with your provider first if you ve ever had a stomach ulcer or GI bleeding.    If the pain is coming from your muscles, you may get relief with ice or heat. During the first 2 days after the injury, put an ice pack on the painful area for 20 minutes every 2 to 4 hours. This will reduce swelling and pain. A hot shower, hot bath, or heating pad works well for a muscle spasm. You can start with ice, then switch to heat after 2 days. You might find that alternating ice and heat works well. Use the method that feels the best to you.  Follow-up care  Follow up with your healthcare provider if your symptoms don t get better over the next few days.  When to seek medical advice  Call your healthcare provider right away if any of these happen:    Repeated vomiting    Fever of 100.4 F (38 C) or higher, or as directed by your health care provider    Flank pain that gets worse    Pain that spreads to the front of your belly (abdomen)    Dizziness, weakness, or fainting    Blood in your urine    Burning feeling when you urinate or the need to urinate often    Pain in one of your legs that gets worse    Numbness or weakness in a leg  Date Last Reviewed: 10/1/2016    1908-4773 [employer]. [address] [facility],  [place] [address]. All rights reserved. This information is not intended as a substitute for professional medical care. Always follow your healthcare professional's instructions.

## 2020-06-09 NOTE — PROGRESS NOTES
SUBJECTIVE:   Joanne Padilla is a 59 year old female who  presents today for a possible UTI. Symptoms of urgency, frequency and hesitancy have been going on for 1week(s).  Hematuria no.  gradual onsetand mild.  There is no history of fever, chills, nausea or vomiting.  No history of vaginal or penile discharge. This patient does have a history of urinary tract infections. Patient denies long duration, rigors, flank pain, temperature > 101 degrees F., Vomiting, significant nausea or diarrhea, taking Coumadin and GFR less than 30 within the last year or vaginal discharge, vaginal odor, vaginal itching and dyspareunia (pain in labia/pelvis)     Past Medical History:   Diagnosis Date     Allergic rhinitis, cause unspecified      Anxiety state, unspecified      Atopic dermatitis 10/10/2011     Cardiac dysrhythmia, unspecified      Chronic rhinitis 10/10/2011     Colitis      Exhaustion due to excessive exertion(994.5)      Gallstones      Hypercholesterolemia      Insomnia, unspecified      Major depressive disorder, single episode, unspecified      Migraines 12/17/2012     Osteoarthritis of CMC joint of thumb      Other acne      Other malaise and fatigue      Palpitations      PONV (postoperative nausea and vomiting)      Current Outpatient Medications   Medication Sig Dispense Refill     albuterol (VENTOLIN HFA) 108 (90 Base) MCG/ACT inhaler Inhale 2 puffs into the lungs every 6 hours as needed for shortness of breath / dyspnea or wheezing 8.5 g 0     amphetamine-dextroamphetamine (ADDERALL) 20 MG tablet TAKE ONE TABLET BY MOUTH TWICE A DAY 60 tablet 0     cholecalciferol (VITAMIN D-1000 MAX ST) 1000 units TABS Take 1,000 Units by mouth       ipratropium (ATROVENT) 0.06 % nasal spray Spray 2 sprays into both nostrils 4 times daily (Patient not taking: Reported on 6/9/2020) 15 mL 0     vitamin B-12 (CYANOCOBALAMIN) 1000 MCG tablet Take 1,000 mcg by mouth       Social History     Tobacco Use     Smoking status:  Current Every Day Smoker     Packs/day: 0.50     Years: 15.00     Pack years: 7.50     Types: Cigarettes     Smokeless tobacco: Never Used     Tobacco comment: Has not been smoking lately but has not quit, [place] quit plan -9/24/13    Substance Use Topics     Alcohol use: No       ROS:   10 point ROS negative except as listed above      OBJECTIVE:  /66   Pulse 97   Temp 97.8  F (36.6  C) (Tympanic)   SpO2 95%   GENERAL APPEARANCE: healthy, alert and no distress  RESP: lungs clear to auscultation - no rales, rhonchi or wheezes  CV: regular rates and rhythm, normal S1 S2, no murmur noted  BACK: No CVA tenderness  SKIN: no suspicious lesions or rashes      Results for orders placed or performed in visit on 06/09/20   *UA reflex to Microscopic and Culture ([facility] (except [place] and [place])     Status: Abnormal    Specimen: Midstream Urine   Result Value Ref Range    Color Urine Yellow     Appearance Urine Clear     Glucose Urine Negative NEG^Negative mg/dL    Bilirubin Urine Negative NEG^Negative    Ketones Urine Negative NEG^Negative mg/dL    Specific Gravity Urine 1.020 1.003 - 1.035    Blood Urine Moderate (A) NEG^Negative    pH Urine 5.5 5.0 - 7.0 pH    Protein Albumin Urine Negative NEG^Negative mg/dL    Urobilinogen Urine 0.2 0.2 - 1.0 EU/dL    Nitrite Urine Negative NEG^Negative    Leukocyte Esterase Urine Trace (A) NEG^Negative    Source Midstream Urine    Urine Microscopic     Status: Abnormal   Result Value Ref Range    WBC Urine 0 - 5 OTO5^0 - 5 /HPF    RBC Urine 2-5 (A) OTO2^O - 2 /HPF    Squamous Epithelial /LPF Urine Few FEW^Few /LPF   Wet prep     Status: None    Specimen: Vagina   Result Value Ref Range    Specimen Description Vagina     Wet Prep No Trichomonas seen     Wet Prep No clue cells seen     Wet Prep No yeast seen     Wet Prep Few  WBC'S seen            ASSESSMENT:   (R30.0) Dysuria  (primary encounter diagnosis)  Comment: covering for bacteria and stone  Plan:  *UA reflex to Microscopic and Culture ([place]         and [facility] (except [place] and         Anupam), Wet prep, Urine Microscopic, Urine         Culture Aerobic Bacterial,         sulfamethoxazole-trimethoprim (BACTRIM DS)         800-160 MG tablet, tamsulosin (FLOMAX) 0.4 MG         capsule, Stone analysis      Culture pending      Red flags and emergent follow up discussed, and understood by patient  Follow up with PCP if symptoms worsen or fail to improve      Patient Instructions     Patient Education     Flank Pain, Uncertain Cause  The flank is the area between your upper abdomen and your back. Pain there is often caused by a problem with your kidneys. It might be a kidney infection or a kidney stone. Other causes of flank pain include spinal arthritis, a pinched nerve from a back injury, or a back muscle strain or spasm.  The cause of your flank pain is not certain. You may need other tests.  Home care  Follow these tips when caring for yourself at home:    You may use acetaminophen or ibuprofen to control pain, unless your health care provider prescribed another medicine. If you have chronic liver or kidney disease, talk with your provider before taking these medicines. Also talk with your provider first if you ve ever had a stomach ulcer or GI bleeding.    If the pain is coming from your muscles, you may get relief with ice or heat. During the first 2 days after the injury, put an ice pack on the painful area for 20 minutes every 2 to 4 hours. This will reduce swelling and pain. A hot shower, hot bath, or heating pad works well for a muscle spasm. You can start with ice, then switch to heat after 2 days. You might find that alternating ice and heat works well. Use the method that feels the best to you.  Follow-up care  Follow up with your healthcare provider if your symptoms don t get better over the next few days.  When to seek medical advice  Call your healthcare provider right away if any  of these happen:    Repeated vomiting    Fever of 100.4 F (38 C) or higher, or as directed by your health care provider    Flank pain that gets worse    Pain that spreads to the front of your belly (abdomen)    Dizziness, weakness, or fainting    Blood in your urine    Burning feeling when you urinate or the need to urinate often    Pain in one of your legs that gets worse    Numbness or weakness in a leg  Date Last Reviewed: 10/1/2016    9859-0268 [employer]. [address] [place] [address]. All rights reserved. This information is not intended as a substitute for professional medical care. Always follow your healthcare professional's instructions.

## 2020-06-10 LAB
BACTERIA SPEC CULT: NO GROWTH
SPECIMEN SOURCE: NORMAL

## 2020-06-15 DIAGNOSIS — F98.8 ATTENTION DEFICIT DISORDER (ADD) WITHOUT HYPERACTIVITY: ICD-10-CM

## 2020-06-16 ENCOUNTER — TRANSFERRED RECORDS (OUTPATIENT)
Dept: HEALTH INFORMATION MANAGEMENT | Facility: CLINIC | Age: 60
End: 2020-06-16

## 2020-06-16 NOTE — TELEPHONE ENCOUNTER
amphetamine-dextroamphetamine (ADDERALL) 20 MG tablet   Last Written Prescription Date:  5/19/20  Last Fill Quantity: 60,   # refills: 0  Last Office Visit: 2/24/20  Future Office visit:       Routing refill request to provider for review/approval because:  Drug not on the [facility], P or [facility] refill protocol or controlled substance    :  5/19/20, 4/20/20, 3/19/20    Madeline Reina RN on 6/16/2020 at 9:21 AM

## 2020-06-18 RX ORDER — DEXTROAMPHETAMINE SACCHARATE, AMPHETAMINE ASPARTATE, DEXTROAMPHETAMINE SULFATE AND AMPHETAMINE SULFATE 5; 5; 5; 5 MG/1; MG/1; MG/1; MG/1
TABLET ORAL
Qty: 60 TABLET | Refills: 0 | Status: SHIPPED | OUTPATIENT
Start: 2020-06-18 | End: 2020-07-20

## 2020-07-03 DIAGNOSIS — Z11.59 SCREENING FOR VIRAL DISEASE: ICD-10-CM

## 2020-07-09 DIAGNOSIS — Z11.59 SCREENING FOR VIRAL DISEASE: ICD-10-CM

## 2020-07-09 PROCEDURE — 36415 COLL VENOUS BLD VENIPUNCTURE: CPT | Performed by: FAMILY MEDICINE

## 2020-07-09 PROCEDURE — 86769 SARS-COV-2 COVID-19 ANTIBODY: CPT | Performed by: FAMILY MEDICINE

## 2020-07-09 NOTE — LETTER
July 16, 2020        Joanne Padilla  [address]      [place] [address]      COVID-19 Antibody, IgG   Date Value Ref Range Status   07/09/2020 Negative NEG^Negative Final     Comment:     Negative results do not rule out SARS-CoV-2 infection, particularly in those   who have been in contact with the virus.  Follow-up testing with a molecular   diagnostic should be considered to rule out infection in these individuals.  Results from antibody testing should not be used as the sole basis to diagnose   or exclude SARS-CoV-2 infection or to inform infection status.           You have tested NEGATIVE for COVID-19 antibodies. This suggests you have not had or been exposed to COVID-19. But it does not mean that for sure.     The test finds antibodies in most people 10 days after they get sick. For some people, it takes longer than 10 days for antibodies to show up. Others may never show antibodies against COVID-19, especially if they have weak immune systems.    If you have COVID-19 symptoms now, please stay home and away from others.     What is antibody testing?    This is a kind of blood test. We take a small sample of your blood, and then test it for something called  antibodies.      Your body makes antibodies to fight infection. If your blood has antibodies for a certain germ, it means you ve been infected with that germ in the past.     Sometimes, antibodies stay in your body for years after you ve had the infection. They can be there even if the germ didn t make you sick. They are a sign that your body fought off the infection.    Will this test find antibodies in everyone who s had COVID-19?    No. The test finds antibodies in most people 10 days after they get sick. For some people, it takes longer than 10 days for antibodies to show up. Others may never show antibodies against COVID-19, especially if they have weak immune systems.    What does it mean if the test finds COVID-19 antibodies?    If  we find these antibodies, it suggests:     This person has had the virus.     Their body s immune system fought the virus.     We don t know if this will help protect someone from getting COVID-19 again. Scientists are still learning about this.    What are the signs of COVID-19?    Signs of COVID-19 can appear from 2 to 14 days (up to 2 weeks) after you re infected. Some people have no symptoms or only mild symptoms. Others get very sick. The most common symptoms are:      Cough    Shortness of breath or trouble breathing  Or at least 2 of these symptoms:    Fever    Chills    Repeated shaking with chills    Muscle pain    Headache    Sore throat    Losing your sense of taste or smell    You may have other symptoms. Please contact your doctor or clinic for any symptoms that worry you.    Where can I get more information?     To learn the [facility] guidelines for staying home, please visit the [place] Department of Health website at [email or web address]/basics.[email or web address]    To learn more about COVID-19 and how to care for yourself at home, please visit the CDC website at https://www.cdc.gov/coronavirus/2019-ncov/about/steps-when-sick.html    For more options for care at [facility], please visit our website at [email or web address]    [facility] ([facility]) COVID-19 Hotline:  [phone]

## 2020-07-10 LAB
COVID-19 ANTIBODY IGG: NEGATIVE
LAB TEST METHOD: NORMAL

## 2020-07-17 DIAGNOSIS — F98.8 ATTENTION DEFICIT DISORDER (ADD) WITHOUT HYPERACTIVITY: ICD-10-CM

## 2020-07-20 RX ORDER — DEXTROAMPHETAMINE SACCHARATE, AMPHETAMINE ASPARTATE, DEXTROAMPHETAMINE SULFATE AND AMPHETAMINE SULFATE 5; 5; 5; 5 MG/1; MG/1; MG/1; MG/1
TABLET ORAL
Qty: 60 TABLET | Refills: 0 | Status: SHIPPED | OUTPATIENT
Start: 2020-07-20 | End: 2020-08-19

## 2020-07-20 NOTE — TELEPHONE ENCOUNTER
RX monitoring program ([place]MP) reviewed:  reviewed- no concerns    [place]MP profile:  [email or web address]    Controlled Substance Refill Request for amphetamine-dextroamphetamine (ADDERALL) 20 MG tablet  Problem List Complete:  Yes   checked in past 3 months?  Yes 6/18/20, 5/19/20, 4/20/20      amphetamine-dextroamphetamine (ADDERALL) 20 MG tablet  Last Written Prescription Date:  6/18/20  Last Fill Quantity: 60,   # refills: 0  Last Office Visit: 2/24/20  Future Office visit:       Routing refill request to provider for review/approval because:  Drug not on the [facility], UMP or  Health refill protocol or controlled substance    Madeline Reina RN on 7/20/2020 at 11:39 AM

## 2020-08-04 ENCOUNTER — NURSE TRIAGE (OUTPATIENT)
Dept: FAMILY MEDICINE | Facility: CLINIC | Age: 60
End: 2020-08-04

## 2020-08-04 NOTE — TELEPHONE ENCOUNTER
Blood in urine (noting in underwear) and back pain. May have kidney stone.   June she had blood in urine and bad back ache - worried about kidney stones, Sacha not availlable. Scheduled appt at [facility]. When she got there they didn't want to see her because they were concerned with Covid. She was eventually seen.   In June she was given Bacrtim DS and tamsulosin. She is unsure if a stone ever passed but symptoms went away.     Today patient has temperature at 99.something. She has not taken anything for it.     Advised ED as best option because they could do imaging for kidney stone. Huddled with PCP and he would like her seen as early as possible - [facility] She is a smoker and besides UTI and kidney stone, should also r/o other concerns like cancer. Patient would like to wait until tomorrow, but understands if she feels worse or bleeding becomes more chris she will go to ED.     is working on finding patient an appointment tomorrow at another clinic as the  clinic has no availability.    Juliet SOSA, Triage RN      Additional Information    Negative: Shock suspected (e.g., cold/pale/clammy skin, too weak to stand, low BP, rapid pulse)    Negative: Sounds like a life-threatening emergency to the triager    Negative: Urinary catheter, questions about    Negative: Recent back or abdominal injury    Negative: Recent genital injury    Negative: Unable to urinate (or only a few drops) > 4 hours and bladder feels very full (e.g., palpable bladder or strong urge to urinate)    Negative: Passing pure blood or large blood clots (i.e., larger than a dime or grape)    Negative: Fever > 100.5 F (38.1 C)    Negative: Patient sounds very sick or weak to the triager    Negative: Known sickle cell disease    Negative: Taking Coumadin (warfarin) or other strong blood thinner, or known bleeding disorder (e.g., thrombocytopenia)    Side (flank) or back pain present    Answer  "Assessment - Initial Assessment Questions  1. COLOR of URINE: \"Describe the color of the urine.\"  (e.g., tea-colored, pink, red, blood clots, bloody)      Dark brownish yellow, no clots  2. ONSET: \"When did the bleeding start?\"       A couple of days ago  3. EPISODES: \"How many times has there been blood in the urine?\" or \"How many times today?\"      \"it's not a ton, not like spotting, but fairly consistent\"  4. PAIN with URINATION: \"Is there any pain with passing your urine?\" If so, ask: \"How bad is the pain?\"  (Scale 1-10; or mild, moderate, severe)     - MILD - complains slightly about urination hurting     - MODERATE - interferes with normal activities       - SEVERE - excruciating, unwilling or unable to urinate because of the pain       No pain with urination   5. FEVER: \"Do you have a fever?\" If so, ask: \"What is your temperature, how was it measured, and when did it start?\"      Temperature was 99.? This morning, oral measurement  6. ASSOCIATED SYMPTOMS: \"Are you passing urine more frequently than usual?\"      no  7. OTHER SYMPTOMS: \"Do you have any other symptoms?\" (e.g., back/flank pain, abdominal pain, vomiting).  Back pain on the right, in the morning she rates 4 or 5 but as she's up during the day it gets better. This is the same as she was experiencing in June, but she's trying to catch it early.   Patient is also having a lot of sinus issues lately and wonders if her fever is related to a sinus infection - has had problems for a bout three years but over the last week it has worsened and she also feels off balance, dizzy and has been having nose bleeds.   8. PREGNANCY: \"Is there any chance you are pregnant?\" \"When was your last menstrual period?\"      No, definitely    Protocols used: URINE - BLOOD IN-A-OH      "

## 2020-08-05 ENCOUNTER — OFFICE VISIT (OUTPATIENT)
Dept: INTERNAL MEDICINE | Facility: CLINIC | Age: 60
End: 2020-08-05
Payer: COMMERCIAL

## 2020-08-05 VITALS
HEIGHT: 67 IN | WEIGHT: 198.5 LBS | TEMPERATURE: 97.9 F | BODY MASS INDEX: 31.16 KG/M2 | HEART RATE: 95 BPM | DIASTOLIC BLOOD PRESSURE: 68 MMHG | SYSTOLIC BLOOD PRESSURE: 106 MMHG | RESPIRATION RATE: 15 BRPM | OXYGEN SATURATION: 94 %

## 2020-08-05 DIAGNOSIS — R39.15 URINARY URGENCY: Primary | ICD-10-CM

## 2020-08-05 LAB
ALBUMIN UR-MCNC: NEGATIVE MG/DL
APPEARANCE UR: CLEAR
BILIRUB UR QL STRIP: NEGATIVE
COLOR UR AUTO: YELLOW
GLUCOSE UR STRIP-MCNC: NEGATIVE MG/DL
HGB UR QL STRIP: ABNORMAL
KETONES UR STRIP-MCNC: NEGATIVE MG/DL
LEUKOCYTE ESTERASE UR QL STRIP: NEGATIVE
NITRATE UR QL: NEGATIVE
NON-SQ EPI CELLS #/AREA URNS LPF: ABNORMAL /LPF
PH UR STRIP: 6 PH (ref 5–7)
RBC #/AREA URNS AUTO: ABNORMAL /HPF
SOURCE: ABNORMAL
SP GR UR STRIP: 1.02 (ref 1–1.03)
SPECIMEN SOURCE: NORMAL
UROBILINOGEN UR STRIP-ACNC: 0.2 EU/DL (ref 0.2–1)
WBC #/AREA URNS AUTO: ABNORMAL /HPF
WET PREP SPEC: NORMAL

## 2020-08-05 PROCEDURE — 81001 URINALYSIS AUTO W/SCOPE: CPT | Performed by: INTERNAL MEDICINE

## 2020-08-05 PROCEDURE — 87210 SMEAR WET MOUNT SALINE/INK: CPT | Performed by: INTERNAL MEDICINE

## 2020-08-05 PROCEDURE — 99214 OFFICE O/P EST MOD 30 MIN: CPT | Performed by: INTERNAL MEDICINE

## 2020-08-05 RX ORDER — SULFAMETHOXAZOLE/TRIMETHOPRIM 800-160 MG
1 TABLET ORAL 2 TIMES DAILY
Qty: 10 TABLET | Refills: 0 | Status: SHIPPED | OUTPATIENT
Start: 2020-08-05 | End: 2020-10-14

## 2020-08-05 ASSESSMENT — MIFFLIN-ST. JEOR: SCORE: 1513.02

## 2020-08-05 NOTE — PROGRESS NOTES
Subjective     Joanne Padilla is a 60 year old female who presents to clinic today for the following health issues:    HPI     Patient states that she tried to get in with her primary physician but was told she could not get a appointment until September or October.    Patient states that a lot of her symptoms are similar to when she was seen in  of this year for dysuria at which time a wet prep was negative and a urinalysis was relatively bland with the exception of blood.  It appears that a kidney stone was suspected but no imaging study was done although a kidney stone analysis was ordered for which the patient did not complete.    She comes back to the clinic today with the same similar symptoms for roughly 2 days as defined below.    URINARY TRACT SYMPTOMS      Duration: 2 days     Description  hematuria and back pain    Intensity:  moderate    Accompanying signs and symptoms: Fever/chills: YES  Flank pain YES- right   Nausea and vomiting: YES- nausea   Vaginal symptoms: Brownish stain in underpants, unsure if coming from vaginal area   Abdominal/Pelvic Pain: no     History  History of frequent UTI's: no   History of kidney stones: YES  Sexually Active: no   Possibility of pregnancy: No    Precipitating or alleviating factors: None  Therapies tried and outcome: Treated for dysuria in  with sulfamethizole and tamsulosin. Never saw a stone pass        Patient Active Problem List   Diagnosis     Tobacco abuse     Chronic rhinitis     Atopic dermatitis     Migraines     Allergic rhinitis     Exhaustion due to excessive exertion(994.5)     Past Surgical History:   Procedure Laterality Date      SECTION       COLONOSCOPY       DAVINCI LAPAROSCOPIC CHOLECYSTECTOMY WITHOUT GRAMS  2013    Procedure: DAVINCI LAPAROSCOPIC CHOLECYSTECTOMY WITHOUT GRAMS;  DAVINCI CHOLECYSTECTOMY;  Surgeon: Jac Stallings MD;  Location: [facility]     GYN SURGERY      repair of fallopian tubes and ovaries      LUMPECTOMY BREAST       LUMPECTOMY BREAST       NOSE SURGERY         Social History     Tobacco Use     Smoking status: Current Every Day Smoker     Packs/day: 0.50     Years: 15.00     Pack years: 7.50     Types: Cigarettes     Smokeless tobacco: Never Used     Tobacco comment: Has not been smoking lately but has not quit, [place] -13    Substance Use Topics     Alcohol use: No     Family History   Problem Relation Age of Onset     C.A.D. Mother         MI early 50s;  59 heart problems     C.A.D. Father          Current Outpatient Medications   Medication Sig Dispense Refill     albuterol (VENTOLIN HFA) 108 (90 Base) MCG/ACT inhaler Inhale 2 puffs into the lungs every 6 hours as needed for shortness of breath / dyspnea or wheezing 8.5 g 0     amphetamine-dextroamphetamine (ADDERALL) 20 MG tablet TAKE ONE TABLET BY MOUTH TWICE A DAY 60 tablet 0     cholecalciferol (VITAMIN D-1000 MAX ST) 1000 units TABS Take 1,000 Units by mouth       ipratropium (ATROVENT) 0.06 % nasal spray Spray 2 sprays into both nostrils 4 times daily 15 mL 0     Multiple Vitamins-Minerals (MULTIVITAMIN ADULT PO) Take by mouth daily       vitamin B-12 (CYANOCOBALAMIN) 1000 MCG tablet Take 1,000 mcg by mouth       Allergies   Allergen Reactions     Codeine Nausea     Latex      BP Readings from Last 3 Encounters:   20 100/66   20 108/78   10/11/19 110/78    Wt Readings from Last 3 Encounters:   20 88.4 kg (194 lb 14.4 oz)   19 83 kg (182 lb 14.4 oz)   19 83.6 kg (184 lb 6.4 oz)           Reviewed and updated as needed this visit by Provider         Review of Systems   CONSTITUTIONAL: NEGATIVE for fever, chills, change in weight  ENT/MOUTH: NEGATIVE for ear, mouth and throat problems  RESP: NEGATIVE for significant cough or SOB  CV: NEGATIVE for chest pain, palpitations or peripheral edema  GI: NEGATIVE for nausea, abdominal pain, heartburn, or change in bowel habits  MUSCULOSKELETAL: NEGATIVE for  "significant arthralgias or myalgia  HEME: NEGATIVE for bleeding problems  PSYCHIATRIC: NEGATIVE for changes in mood or affect      Objective    /68   Pulse 95   Temp 97.9  F (36.6  C)   Resp 15   Ht 1.702 m (5' 7\")   Wt 90 kg (198 lb 8 oz)   SpO2 94%   BMI 31.09 kg/m    Body mass index is 31.09 kg/m .     Physical Exam   GENERAL: alert and no distress  EYES: Eyes grossly normal to inspection, PERRL and conjunctivae and sclerae normal  HENT: ear canals and TM's normal, nose and mouth without ulcers or lesions  NECK: no adenopathy, no asymmetry, masses, or scars and thyroid normal to palpation  RESP: lungs clear to auscultation - no rales, rhonchi or wheezes  CV: regular rate and rhythm, normal S1 S2, no S3 or S4, no click or rub, no peripheral edema and peripheral pulses strong  MS: no gross musculoskeletal defects noted  She has no obvious nor distinct abdominal pain or flank discomfort  NEURO: No focal changes  PSYCH: mentation appears normal, affect normal/bright      Creatinine   Date Value Ref Range Status   02/24/2020 0.56 0.52 - 1.04 mg/dL Final       Assessment & Plan     1. Urinary urgency  Suggest repeat urinalysis and wet prep to rule out infectious etiology.  Bactrim DS potentially of benefit pending urinalysis results.  At this point patient may benefit from CT imaging of the abdomen to rule out a renal stone as a source of her symptoms as we do not have recent documentation of such although prior urinalysis may be suggestive.  CT would also rule out a secondary source.  - *UA reflex to Microscopic and Culture ([place] and [facility] (except [place] and [place])  - CT Abdomen wo & w & Pelvis w Contrast; Future  - sulfamethoxazole-trimethoprim (BACTRIM DS) 800-160 MG tablet; Take 1 tablet by mouth 2 times daily  Dispense: 10 tablet; Refill: 0  - Wet prep     Smoking cessation was advised and the risks of continued smoking in regards to this patients health history was reiterated. " Options of smoking cessation were also discussed. This time extended beyond the routine exam.    See Patient Instructions    Return for if symptoms recur or worsen, diagnostic test as ordered.    Benny Mendoza MD  [facility]

## 2020-08-18 DIAGNOSIS — F98.8 ATTENTION DEFICIT DISORDER (ADD) WITHOUT HYPERACTIVITY: ICD-10-CM

## 2020-08-18 NOTE — TELEPHONE ENCOUNTER
amphetamine-dextroamphetamine (ADDERALL) 20 MG tablet  Last Written Prescription Date:  7/20/20  Last Fill Quantity: 60,   # refills: 0  Last Office Visit: 2/24/20  Future Office visit:       Routing refill request to provider for review/approval because:  Drug not on the [facility], [facility] or [facility] refill protocol or controlled substance    ; 7/21/20, 6/18/20, 5/19/20    Madeline Reina RN on 8/18/2020 at 3:51 PM

## 2020-08-19 RX ORDER — DEXTROAMPHETAMINE SACCHARATE, AMPHETAMINE ASPARTATE, DEXTROAMPHETAMINE SULFATE AND AMPHETAMINE SULFATE 5; 5; 5; 5 MG/1; MG/1; MG/1; MG/1
TABLET ORAL
Qty: 60 TABLET | Refills: 0 | Status: SHIPPED | OUTPATIENT
Start: 2020-08-19 | End: 2020-09-18

## 2020-09-17 DIAGNOSIS — F98.8 ATTENTION DEFICIT DISORDER (ADD) WITHOUT HYPERACTIVITY: ICD-10-CM

## 2020-09-17 NOTE — TELEPHONE ENCOUNTER
Adderall 20 mg    Last Written Prescription Date:  8/19/2020  Last Fill Quantity: 60,  # refills: 0   Last office visit: 2/24/2020 with prescribing provider:  LOV for ADHD 10/11/2019   Future Office Visit:        [place]  No concerns.    Radha Arguello RN

## 2020-09-18 RX ORDER — DEXTROAMPHETAMINE SACCHARATE, AMPHETAMINE ASPARTATE, DEXTROAMPHETAMINE SULFATE AND AMPHETAMINE SULFATE 5; 5; 5; 5 MG/1; MG/1; MG/1; MG/1
TABLET ORAL
Qty: 60 TABLET | Refills: 0 | Status: SHIPPED | OUTPATIENT
Start: 2020-09-18 | End: 2020-10-18

## 2020-09-20 ENCOUNTER — OFFICE VISIT (OUTPATIENT)
Dept: URGENT CARE | Facility: URGENT CARE | Age: 60
End: 2020-09-20
Payer: COMMERCIAL

## 2020-09-20 VITALS
WEIGHT: 201.3 LBS | HEART RATE: 91 BPM | OXYGEN SATURATION: 99 % | BODY MASS INDEX: 31.53 KG/M2 | TEMPERATURE: 98.2 F | SYSTOLIC BLOOD PRESSURE: 118 MMHG | DIASTOLIC BLOOD PRESSURE: 72 MMHG

## 2020-09-20 DIAGNOSIS — H11.32 SUBCONJUNCTIVAL HEMORRHAGE OF LEFT EYE: Primary | ICD-10-CM

## 2020-09-20 PROCEDURE — 99213 OFFICE O/P EST LOW 20 MIN: CPT | Performed by: INTERNAL MEDICINE

## 2020-09-21 NOTE — PATIENT INSTRUCTIONS
Use warm packing for the eyes before you go to bed to help cleanse the eyes and keep a healthy tear film.  Can also use an OTC soothing eye drop to keep the eye moist and lubricated.    Examples would be Systane or other brands of artificial tears.    Lubricant eye gel for soothing and temporary relief.  OTC options include:  Systane Gel Drops  Soothe Lubricant Eye Drops  Genteal Lubricant Eye Gel    Lubricant ingredients to look for include glycerin, propylene glycol, carboxymethylcellulose, hypromellose

## 2020-09-21 NOTE — PROGRESS NOTES
SUBJECTIVE:  Joanne Padilla, a 60 year old female, presents for evaluation of redness of the left eye. She had noticed this initially yesterday and it seems to be getting bigger in the past day. She does have some allergies.  Not too much pain. Vision is normal. She isn't using any soothing eye drops.      OBJECTIVE:  /72   Pulse 91   Temp 98.2  F (36.8  C) (Tympanic)   Wt 91.3 kg (201 lb 4.8 oz)   SpO2 99%   BMI 31.53 kg/m    GEN: healthy adult female  EYE: there is a subconjunctival hemorrhage localized to the superior nasal quadrant of the left sclera; cornea is clear; conjunctiva is otherwise clear    ASSESSMENT/PLAN:    ICD-10-CM    1. Subconjunctival hemorrhage of left eye  H11.32      Patient Instructions   Use warm packing for the eyes before you go to bed to help cleanse the eyes and keep a healthy tear film.  Can also use an OTC soothing eye drop to keep the eye moist and lubricated.    Examples would be Systane or other brands of artificial tears.    Lubricant eye gel for soothing and temporary relief.  OTC options include:  Systane Gel Drops  Soothe Lubricant Eye Drops  Genteal Lubricant Eye Gel    Lubricant ingredients to look for include glycerin, propylene glycol, carboxymethylcellulose, hypromellose         Kannan Montoya MD

## 2020-10-14 ENCOUNTER — OFFICE VISIT (OUTPATIENT)
Dept: URGENT CARE | Facility: URGENT CARE | Age: 60
End: 2020-10-14
Payer: COMMERCIAL

## 2020-10-14 ENCOUNTER — ANCILLARY PROCEDURE (OUTPATIENT)
Dept: GENERAL RADIOLOGY | Facility: CLINIC | Age: 60
End: 2020-10-14
Attending: FAMILY MEDICINE
Payer: COMMERCIAL

## 2020-10-14 VITALS
SYSTOLIC BLOOD PRESSURE: 121 MMHG | TEMPERATURE: 97.8 F | DIASTOLIC BLOOD PRESSURE: 88 MMHG | HEART RATE: 96 BPM | OXYGEN SATURATION: 97 % | BODY MASS INDEX: 31.79 KG/M2 | WEIGHT: 203 LBS

## 2020-10-14 DIAGNOSIS — J22 LOWER RESPIRATORY TRACT INFECTIOUS DISEASE: ICD-10-CM

## 2020-10-14 DIAGNOSIS — R42 DIZZINESS: ICD-10-CM

## 2020-10-14 DIAGNOSIS — R53.83 FATIGUE, UNSPECIFIED TYPE: ICD-10-CM

## 2020-10-14 DIAGNOSIS — R06.02 SOB (SHORTNESS OF BREATH): Primary | ICD-10-CM

## 2020-10-14 LAB
ALBUMIN SERPL-MCNC: 3.5 G/DL (ref 3.4–5)
ALP SERPL-CCNC: 85 U/L (ref 40–150)
ALT SERPL W P-5'-P-CCNC: 23 U/L (ref 0–50)
ANION GAP SERPL CALCULATED.3IONS-SCNC: 6 MMOL/L (ref 3–14)
AST SERPL W P-5'-P-CCNC: 26 U/L (ref 0–45)
BASOPHILS # BLD AUTO: 0.1 10E9/L (ref 0–0.2)
BASOPHILS NFR BLD AUTO: 1.3 %
BILIRUB SERPL-MCNC: 0.6 MG/DL (ref 0.2–1.3)
BUN SERPL-MCNC: 13 MG/DL (ref 7–30)
CALCIUM SERPL-MCNC: 9.8 MG/DL (ref 8.5–10.1)
CHLORIDE SERPL-SCNC: 104 MMOL/L (ref 94–109)
CO2 SERPL-SCNC: 29 MMOL/L (ref 20–32)
CREAT SERPL-MCNC: 0.7 MG/DL (ref 0.52–1.04)
DIFFERENTIAL METHOD BLD: ABNORMAL
EOSINOPHIL # BLD AUTO: 0.2 10E9/L (ref 0–0.7)
EOSINOPHIL NFR BLD AUTO: 3.8 %
ERYTHROCYTE [DISTWIDTH] IN BLOOD BY AUTOMATED COUNT: 14.4 % (ref 10–15)
GFR SERPL CREATININE-BSD FRML MDRD: >90 ML/MIN/{1.73_M2}
GLUCOSE SERPL-MCNC: 96 MG/DL (ref 70–99)
HCT VFR BLD AUTO: 43.3 % (ref 35–47)
HGB BLD-MCNC: 14.3 G/DL (ref 11.7–15.7)
LYMPHOCYTES # BLD AUTO: 2.4 10E9/L (ref 0.8–5.3)
LYMPHOCYTES NFR BLD AUTO: 59.5 %
MCH RBC QN AUTO: 30.8 PG (ref 26.5–33)
MCHC RBC AUTO-ENTMCNC: 33 G/DL (ref 31.5–36.5)
MCV RBC AUTO: 93 FL (ref 78–100)
MONOCYTES # BLD AUTO: 0.4 10E9/L (ref 0–1.3)
MONOCYTES NFR BLD AUTO: 9.8 %
NEUTROPHILS # BLD AUTO: 1 10E9/L (ref 1.6–8.3)
NEUTROPHILS NFR BLD AUTO: 25.6 %
PLATELET # BLD AUTO: 289 10E9/L (ref 150–450)
POTASSIUM SERPL-SCNC: 4.5 MMOL/L (ref 3.4–5.3)
PROT SERPL-MCNC: 6.7 G/DL (ref 6.8–8.8)
RBC # BLD AUTO: 4.65 10E12/L (ref 3.8–5.2)
SODIUM SERPL-SCNC: 139 MMOL/L (ref 133–144)
WBC # BLD AUTO: 4 10E9/L (ref 4–11)

## 2020-10-14 PROCEDURE — 71046 X-RAY EXAM CHEST 2 VIEWS: CPT | Performed by: RADIOLOGY

## 2020-10-14 PROCEDURE — 36415 COLL VENOUS BLD VENIPUNCTURE: CPT | Performed by: FAMILY MEDICINE

## 2020-10-14 PROCEDURE — 99214 OFFICE O/P EST MOD 30 MIN: CPT | Performed by: FAMILY MEDICINE

## 2020-10-14 PROCEDURE — 80050 GENERAL HEALTH PANEL: CPT | Performed by: FAMILY MEDICINE

## 2020-10-14 RX ORDER — ALBUTEROL SULFATE 90 UG/1
2 AEROSOL, METERED RESPIRATORY (INHALATION) EVERY 6 HOURS
Qty: 1 INHALER | Refills: 0 | Status: SHIPPED | OUTPATIENT
Start: 2020-10-14 | End: 2021-01-20

## 2020-10-14 RX ORDER — DOXYCYCLINE 100 MG/1
100 CAPSULE ORAL 2 TIMES DAILY
Qty: 20 CAPSULE | Refills: 0 | Status: SHIPPED | OUTPATIENT
Start: 2020-10-14 | End: 2020-10-24

## 2020-10-15 LAB — TSH SERPL DL<=0.005 MIU/L-ACNC: 3.31 MU/L (ref 0.4–4)

## 2020-10-15 NOTE — PATIENT INSTRUCTIONS
Use albuterol inhaler to help with cough and shortness of breath  Okay to try combivent inhaler as this works better with individuals with TOB use - this already has albuterol in it so do not use combivent and albuterol together.  Take full course of antibiotic for coverage of bronchitis/lower respiratory tract infection.    Stop smoking.    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    Patient Education     Bronchitis, Antibiotic Treatment (Adult)    Bronchitis is an infection of the air passages (bronchial tubes) in your lungs. It often occurs when you have a cold. This illness is contagious during the first few days and is spread through the air by coughing and sneezing, or by direct contact (touching the sick person and then touching your own eyes, nose, or mouth).  Symptoms of bronchitis include cough with mucus (phlegm) and low-grade fever. Bronchitis usually lasts 7 to 14 days. Mild cases can be treated with simple home remedies. More severe infection is treated with an antibiotic.  Home care  Follow these guidelines when caring for yourself at home:    If your symptoms are severe, rest at home for the first 2 to 3 days. When you go back to your usual activities, don't let yourself get too tired.    Don't smoke. Also stay away from secondhand smoke.    You may use over-the-counter medicines to control fever or pain, unless another medicine was prescribed. If you have chronic liver or kidney disease or have ever had a stomach ulcer or gastrointestinal bleeding, talk with your healthcare provider before using these medicines. Also talk to your provider if you are taking medicine to prevent blood clots. Aspirin should never be given to anyone younger than 18 who is ill with a viral infection or fever. It may cause severe liver or brain damage.    Your appetite may be low, so a light diet is fine. Stay well hydrated by drinking 6 to 8 glasses of fluids per day. This includes water,  soft drinks, sports drinks, juices, tea, or soup. Extra fluids will help loosen mucus in your nose and lungs.    Over-the-counter cough, cold, and sore-throat medicines will not shorten the length of the illness, but they may be helpful to reduce your symptoms. Don't use decongestants if you have high blood pressure.    Finish all antibiotic medicine. Do this even if you are feeling better after only a few days.  Follow-up care  Follow up with your healthcare provider, or as advised. If you had an X-ray or ECG (electrocardiogram), a specialist will review it. You will be told of any new test results that may affect your care.  If you are age 65 or older, if you smoke, or if you have a chronic lung disease or condition that affects your immune system, ask your healthcare provider about getting a pneumococcal vaccine and a yearly flu shot (influenza vaccine).  When to seek medical advice  Call your healthcare provider right away if any of these occur:    Fever of 100.4 F (38 C) or higher, or as directed by your healthcare provider    Coughing up more sputum    Weakness, drowsiness, headache, facial pain, ear pain, or a stiff neck  Call 911  Call 911 if any of these occur.    Coughing up blood    Weakness, drowsiness, headache, or stiff neck that get worse    Trouble breathing, wheezing, or pain with breathing  Date Last Reviewed: 6/1/2018 2000-2019 [employer]. [address] [place] [address]. All rights reserved. This information is not intended as a substitute for professional medical care. Always follow your healthcare professional's instructions.           Patient Education     What is COPD?  COPD stands for chronic obstructive pulmonary disease. It means the airways in your lungs are blocked (obstructed). This makes it hard to breathe. You may have trouble with daily activities because of shortness of breath. Over time the shortness of breath often gets worse. This makes it harder to  take care of yourself and take part in activities. Chronic bronchitis and emphysema are 2 common types of COPD.  How did I get COPD?  Most people get COPD from smoking. Cigarette smoke damages lungs. This can develop into COPD over many years.  How COPD affects you  COPD makes you work harder to breathe. Air may get trapped in the lungs. This prevents your lungs from filling completely with fresh oxygen-filled air when you breathe in (inhale). It's harder to take deep breaths, especially when you are active and start breathing faster. Over time your lungs may become enlarged, filled with air that does not transfer oxygen into the blood. These problems lead to shortness of breath (also called dyspnea). Hoarse, whistling breathing (wheezing) and a chronic cough are common. So is feeling tired and worn out (fatigue).   What happens in chronic bronchitis?    The cells in the airways make more mucus than normal. The mucus builds up, narrowing the airways. This means less air travels into and out of the lungs. The lining of the airways may also become swollen (inflamed). This causes the airways to narrow even more.  What happens in emphysema?    The small airways are damaged and lose their stretchiness. The airways collapse when you exhale. This causes air to get trapped in the air sacs. This means that less oxygen enters the blood vessels. And less oxygen is delivered to all of the cells of your body. This makes it hard to breathe.  Damage to cilia    Cilia are small hairs that line and protect the airways. Smoking damages the cilia. Damaged cilia can t sweep mucus and particles away. Some of the cilia are destroyed. This damage makes COPD worse.  Date Last Reviewed: 8/1/2018 2000-2019 [employer]. [address] [place] [address], [place] [address]. All rights reserved. This information is not intended as a substitute for professional medical care. Always follow your healthcare professional's instructions.

## 2020-10-15 NOTE — PROGRESS NOTES
SUBJECTIVE:   Joanne Padilla is a 60 year old female presenting with a chief complaint of dizziness, fatigue, SOB, coughing.    Works from home.  No exposure to COVID.  No fever.    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  Started to notice that has been more fatigue.  Feels like she is not getting enough oxygen, after she wakes up will feel like she was in a garage filled with exhaust fumes.  Feeling more dizzy, brain fog.    Has been struggling with cough and SOB for several years which is not new but is feeling worse.  Endorsed loss of taste and smell.  Has underlying allergic rhinitis, TOB use but is working on decreasing now on 5 cigarettes a day.      Past Medical History:   Diagnosis Date     Allergic rhinitis, cause unspecified      Anxiety state, unspecified      Atopic dermatitis 10/10/2011     Cardiac dysrhythmia, unspecified      Chronic rhinitis 10/10/2011     Colitis      Exhaustion due to excessive exertion(994.5)      Gallstones      Hypercholesterolemia      Insomnia, unspecified      Major depressive disorder, single episode, unspecified      Migraines 12/17/2012     Osteoarthritis of CMC joint of thumb      Other acne      Other malaise and fatigue      Palpitations      PONV (postoperative nausea and vomiting)      Current Outpatient Medications   Medication Sig Dispense Refill     albuterol (VENTOLIN HFA) 108 (90 Base) MCG/ACT inhaler Inhale 2 puffs into the lungs every 6 hours as needed for shortness of breath / dyspnea or wheezing 8.5 g 0     amphetamine-dextroamphetamine (ADDERALL) 20 MG tablet TAKE ONE TABLET BY MOUTH TWICE A DAY 60 tablet 0     cholecalciferol (VITAMIN D-1000 MAX ST) 1000 units TABS Take 1,000 Units by mouth       ipratropium (ATROVENT) 0.06 % nasal spray Spray 2 sprays into both nostrils 4 times daily 15 mL 0     Multiple Vitamins-Minerals (MULTIVITAMIN ADULT PO) Take by mouth daily       Social History     Tobacco Use      Smoking status: Current Every Day Smoker     Packs/day: 0.50     Years: 15.00     Pack years: 7.50     Types: Cigarettes     Smokeless tobacco: Never Used     Tobacco comment: Has not been smoking lately but has not quit, [place] -9/24/13    Substance Use Topics     Alcohol use: No       ROS:  Review of systems negative except as stated above.    OBJECTIVE:  /88   Pulse 96   Temp 97.8  F (36.6  C) (Tympanic)   Wt 92.1 kg (203 lb)   SpO2 97%   BMI 31.79 kg/m    GENERAL APPEARANCE: healthy, alert and no distress  EYES: EOMI,  PERRL, conjunctiva clear  RESP: lungs coarse with a few rhonchi, no crackles or wheezes  CV: regular rates and rhythm, normal S1 S2, no murmur noted  PSYCH: mentation appears normal and affect normal/bright    CXR - no acute infiltrate, no pleural effusion, no pneumothorax personally viewed by me    Results for orders placed or performed in visit on 10/14/20   CBC with platelets and differential     Status: Abnormal   Result Value Ref Range    WBC 4.0 4.0 - 11.0 10e9/L    RBC Count 4.65 3.8 - 5.2 10e12/L    Hemoglobin 14.3 11.7 - 15.7 g/dL    Hematocrit 43.3 35.0 - 47.0 %    MCV 93 78 - 100 fl    MCH 30.8 26.5 - 33.0 pg    MCHC 33.0 31.5 - 36.5 g/dL    RDW 14.4 10.0 - 15.0 %    Platelet Count 289 150 - 450 10e9/L    Diff Method Automated Method     % Neutrophils 25.6 %    % Lymphocytes 59.5 %    % Monocytes 9.8 %    % Eosinophils 3.8 %    % Basophils 1.3 %    Absolute Neutrophil 1.0 (L) 1.6 - 8.3 10e9/L    Absolute Lymphocytes 2.4 0.8 - 5.3 10e9/L    Absolute Monocytes 0.4 0.0 - 1.3 10e9/L    Absolute Eosinophils 0.2 0.0 - 0.7 10e9/L    Absolute Basophils 0.1 0.0 - 0.2 10e9/L   Comprehensive metabolic panel     Status: Abnormal   Result Value Ref Range    Sodium 139 133 - 144 mmol/L    Potassium 4.5 3.4 - 5.3 mmol/L    Chloride 104 94 - 109 mmol/L    Carbon Dioxide 29 20 - 32 mmol/L    Anion Gap 6 3 - 14 mmol/L    Glucose 96 70 - 99 mg/dL    Urea Nitrogen 13 7 - 30 mg/dL     Creatinine 0.70 0.52 - 1.04 mg/dL    GFR Estimate >90 >60 mL/min/[1.73_m2]    GFR Estimate If Black >90 >60 mL/min/[1.73_m2]    Calcium 9.8 8.5 - 10.1 mg/dL    Bilirubin Total 0.6 0.2 - 1.3 mg/dL    Albumin 3.5 3.4 - 5.0 g/dL    Protein Total 6.7 (L) 6.8 - 8.8 g/dL    Alkaline Phosphatase 85 40 - 150 U/L    ALT 23 0 - 50 U/L    AST 26 0 - 45 U/L         ASSESSMENT/PLAN:  (R06.02) SOB (shortness of breath)  (primary encounter diagnosis)  Plan: XR Chest 2 Views, albuterol (PROAIR         HFA/PROVENTIL HFA/VENTOLIN HFA) 108 (90 Base)         MCG/ACT inhaler, ipratropium-albuterol         (COMBIVENT RESPIMAT)  MCG/ACT inhaler            (R42) Dizziness  Plan: CBC with platelets and differential,         Comprehensive metabolic panel, TSH with free T4        reflex            (R53.83) Fatigue, unspecified type  Plan: CBC with platelets and differential,         Comprehensive metabolic panel, TSH with free T4        reflex           (J22) Lower respiratory tract infectious disease  Plan: albuterol (PROAIR HFA/PROVENTIL HFA/VENTOLIN         HFA) 108 (90 Base) MCG/ACT inhaler,         ipratropium-albuterol (COMBIVENT RESPIMAT)          MCG/ACT inhaler, doxycycline hyclate         (VIBRAMYCIN) 100 MG capsule            Reassurance given, reviewed that vitals and oxygenation is normal.  Discussed that may be more sensitive to environmental exposures and should contact  in regards to duration of work that is being performed on the building and its effects on her.  Due to prolong symptoms and comorbid medical conditions and TOB use, RX doxycycline given for coverage for possible bacterial lower respiratory tract infection.  RX albuterol inhaler and RX combivent inhaler given to help with cough and SOB.  Encourage TOB cessation.  Will follow up on pending labs and notify if any abnormalities.    Follow up with primary provider if no improvement of symptoms in 1-2 weeks.    Deon De La Torre MD  October 14,  2020 9:35 PM

## 2020-10-16 DIAGNOSIS — F98.8 ATTENTION DEFICIT DISORDER (ADD) WITHOUT HYPERACTIVITY: ICD-10-CM

## 2020-10-17 NOTE — TELEPHONE ENCOUNTER
adderall  Last Written Prescription Date: 09/018/20  Last Fill Quantity: 60  # refills: 0  Last office visit: 2/24/2020 with prescribing provider:  02/24/20  Future Office Visit:

## 2020-10-18 RX ORDER — DEXTROAMPHETAMINE SACCHARATE, AMPHETAMINE ASPARTATE, DEXTROAMPHETAMINE SULFATE AND AMPHETAMINE SULFATE 5; 5; 5; 5 MG/1; MG/1; MG/1; MG/1
TABLET ORAL
Qty: 60 TABLET | Refills: 0 | Status: SHIPPED | OUTPATIENT
Start: 2020-10-18 | End: 2020-11-18

## 2020-11-16 DIAGNOSIS — F98.8 ATTENTION DEFICIT DISORDER (ADD) WITHOUT HYPERACTIVITY: ICD-10-CM

## 2020-11-16 NOTE — LETTER
November 25, 2020      Joanne Merna Valerie  [address]      [facility] [address]        Dear Ms. Joanne Padilla,    We are contacting you today to notify you that you are due for a medication follow up for further refills for your Adderall.    This appointment needs to be an in clinic.     Please call [phone] to schedule an appointment.     [facility]      Sincerely,     Fermín Larios PA-C

## 2020-11-17 NOTE — TELEPHONE ENCOUNTER
Controlled Substance Refill Request for AMPHETAMINE-DEXTROAMPHETAMI 20 TABS    Problem List Complete:  No     PROVIDER TO CONSIDER COMPLETION OF PROBLEM LIST AND OVERVIEW/CONTROLLED SUBSTANCE AGREEMENT    Last Written Prescription Date:  10/18/2020  Last Fill Quantity: 60 tablet,  # refills: 0   Last office visit: 2/24/2020 with prescribing provider:  Ric Johnson Office Visit:        Controlled substance agreement:   Encounter-Level CSA:    There are no encounter-level csa.     Patient-Level CSA:    There are no patient-level csa.         Last Urine Drug Screen: No results found for: CDAUT, No results found for: COMDAT, No results found for: THC13, PCP13, COC13, MAMP13, OPI13, AMP13, BZO13, TCA13, MTD13, BAR13, OXY13, PPX13, BUP13     Processing:  Rx to be electronically transmitted to pharmacy by provider      [email or web address]       checked in past 3 months?      10/19/20, 9/18/20, 8/20/20

## 2020-11-18 RX ORDER — DEXTROAMPHETAMINE SACCHARATE, AMPHETAMINE ASPARTATE, DEXTROAMPHETAMINE SULFATE AND AMPHETAMINE SULFATE 5; 5; 5; 5 MG/1; MG/1; MG/1; MG/1
TABLET ORAL
Qty: 60 TABLET | Refills: 0 | Status: SHIPPED | OUTPATIENT
Start: 2020-11-18 | End: 2021-02-17

## 2020-12-17 NOTE — PROGRESS NOTES
"Joanne Padilla is a 60 year old female who is being evaluated via a billable video visit.      The patient has been notified of following:     \"This video visit will be conducted via a call between you and your physician/provider. We have found that certain health care needs can be provided without the need for an in-person physical exam.  This service lets us provide the care you need with a video conversation.  If a prescription is necessary we can send it directly to your pharmacy.  If lab work is needed we can place an order for that and you can then stop by our lab to have the test done at a later time.    Video visits are billed at different rates depending on your insurance coverage.  Please reach out to your insurance provider with any questions.    If during the course of the call the physician/provider feels a video visit is not appropriate, you will not be charged for this service.\"    Patient has given verbal consent for Video visit? {YES-NO  Default Yes:4444::\"Yes\"}  How would you like to obtain your AVS? {AVS Preference:490546}  If you are dropped from the video visit, the video invite should be resent to: {video visit invitation:893788}  Will anyone else be joining your video visit? {:866399}  {If patient encounters technical issues they should call [phone] :857414}    Subjective     Joanne Padilla is a 60 year old female who presents today via video visit for the following health issues:    HPI     Medication Followup of Addreall     Taking Medication as prescribed: {.:989897::\"yes\"}    Side Effects:  {NONEORCHOOSE:298750::\"None\"}    Medication Helping Symptoms:  {.:425517::\"yes\"}       Video Start Time: {video visit start/end time for provider to select:937036}    {additonal problems for provider to add (Optional):730575}    Review of Systems   {ROS COMP (Optional):615195}      Objective           Vitals:  No vitals were obtained today due to virtual visit.    Physical Exam " "    {video visit exam brief selected:646947::\"GENERAL: Healthy, alert and no distress\",\"EYES: Eyes grossly normal to inspection.  No discharge or erythema, or obvious scleral/conjunctival abnormalities.\",\"RESP: No audible wheeze, cough, or visible cyanosis.  No visible retractions or increased work of breathing.  \",\"SKIN: Visible skin clear. No significant rash, abnormal pigmentation or lesions.\",\"NEURO: Cranial nerves grossly intact.  Mentation and speech appropriate for age.\",\"PSYCH: Mentation appears normal, affect normal/bright, judgement and insight intact, normal speech and appearance well-groomed.\"}      {Diagnostic Test Results (Optional):632559}        {PROVIDER CHARTING PREFERENCE:096849}      Video-Visit Details    Type of service:  Video Visit    Video End Time:{video visit start/end time for provider to select:026246}    Originating Location (pt. Location): {video visit patient location:751996::\"Home\"}    Distant Location (provider location):  [facility]     Platform used for Video Visit: {Virtual Visit Platforms:577879::\"Gigzolo\"}        "

## 2020-12-18 ENCOUNTER — VIRTUAL VISIT (OUTPATIENT)
Dept: FAMILY MEDICINE | Facility: CLINIC | Age: 60
End: 2020-12-18
Payer: COMMERCIAL

## 2020-12-18 DIAGNOSIS — R21 SKIN RASH: ICD-10-CM

## 2020-12-18 DIAGNOSIS — J32.9 CHRONIC SINUSITIS, UNSPECIFIED LOCATION: ICD-10-CM

## 2020-12-18 DIAGNOSIS — F98.8 ATTENTION DEFICIT DISORDER (ADD) WITHOUT HYPERACTIVITY: Primary | ICD-10-CM

## 2020-12-18 PROCEDURE — 99214 OFFICE O/P EST MOD 30 MIN: CPT | Mod: 95 | Performed by: PHYSICIAN ASSISTANT

## 2020-12-18 RX ORDER — DEXTROAMPHETAMINE SACCHARATE, AMPHETAMINE ASPARTATE, DEXTROAMPHETAMINE SULFATE AND AMPHETAMINE SULFATE 5; 5; 5; 5 MG/1; MG/1; MG/1; MG/1
20 TABLET ORAL 2 TIMES DAILY
Qty: 60 TABLET | Refills: 0 | Status: SHIPPED | OUTPATIENT
Start: 2021-02-18 | End: 2021-03-18

## 2020-12-18 RX ORDER — DEXTROAMPHETAMINE SACCHARATE, AMPHETAMINE ASPARTATE, DEXTROAMPHETAMINE SULFATE AND AMPHETAMINE SULFATE 5; 5; 5; 5 MG/1; MG/1; MG/1; MG/1
20 TABLET ORAL 2 TIMES DAILY
Qty: 60 TABLET | Refills: 0 | Status: SHIPPED | OUTPATIENT
Start: 2021-01-18 | End: 2021-02-17

## 2020-12-18 RX ORDER — DEXTROAMPHETAMINE SACCHARATE, AMPHETAMINE ASPARTATE, DEXTROAMPHETAMINE SULFATE AND AMPHETAMINE SULFATE 5; 5; 5; 5 MG/1; MG/1; MG/1; MG/1
20 TABLET ORAL 2 TIMES DAILY
Qty: 60 TABLET | Refills: 0 | Status: SHIPPED | OUTPATIENT
Start: 2020-12-18 | End: 2021-01-17

## 2020-12-18 NOTE — PROGRESS NOTES
"Joanne Padilla is a 60 year old female who is being evaluated via a billable telephone visit.      The patient has been notified of following:     \"This telephone visit will be conducted via a call between you and your physician/provider. We have found that certain health care needs can be provided without the need for a physical exam.  This service lets us provide the care you need with a short phone conversation.  If a prescription is necessary we can send it directly to your pharmacy.  If lab work is needed we can place an order for that and you can then stop by our lab to have the test done at a later time.    Telephone visits are billed at different rates depending on your insurance coverage. During this emergency period, for some insurers they may be billed the same as an in-person visit.  Please reach out to your insurance provider with any questions.    If during the course of the call the physician/provider feels a telephone visit is not appropriate, you will not be charged for this service.\"    Patient has given verbal consent for Telephone visit?  Yes    What phone number would you like to be contacted at? [phone]    How would you like to obtain your AVS? 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       Objective          Vitals:  No vitals were obtained today due to virtual visit.    healthy, alert and no distress  PSYCH: Alert and oriented times 3; coherent speech, normal   rate and volume, able to articulate logical thoughts, able   to abstract reason, no tangential thoughts, no hallucinations   or delusions  Her affect is normal  RESP: No cough, no audible wheezing, able to talk in full sentences  Remainder of exam unable to be completed due to telephone visits            Assessment/Plan:    Assessment & Plan     Attention deficit disorder (ADD) without hyperactivity  Ok to refill. I continue to stress to Joanne that if she were to improve sleep hygiene and lifestyle measures I suspect she would feel improved and potentialily need less medication, which she is goal directed for. Ok to continue for now.   - amphetamine-dextroamphetamine (ADDERALL) 20 MG tablet; Take 1 tablet (20 mg) by mouth 2 times daily  - amphetamine-dextroamphetamine (ADDERALL) 20 MG tablet; Take 1 tablet (20 mg) by mouth 2 times daily  - amphetamine-dextroamphetamine (ADDERALL) 20 MG tablet; Take 1 tablet (20 mg) by mouth 2 times daily    Chronic sinusitis, unspecified location  Skin rash  Reviewed with her the last allergy note. Sounds like she was not aware of the possible follow up and new medication, potential imaging. Recommend she reach out and schedule follow up as symptoms continue       Tobacco Cessation:   reports that she has been smoking cigarettes. She has a 7.50 pack-year smoking history. She has never used smokeless tobacco.  Tobacco Cessation Action Plan: Information offered: Patient not interested at this time      BMI:   Estimated body mass index is 31.79 kg/m  as calculated from the following:    Height as of 8/5/20: 1.702 m (5' 7\").    Weight as of 10/14/20: 92.1 kg (203 lb).              Return in about 3 months (around " 3/18/2021) for Physical Exam, Lab Work/PREVENTIVE HEALTH updates.    Fermín Larios PA-C  [facility]    Phone call duration:  15 minutes

## 2021-01-15 ENCOUNTER — HEALTH MAINTENANCE LETTER (OUTPATIENT)
Age: 61
End: 2021-01-15

## 2021-01-19 DIAGNOSIS — J22 LOWER RESPIRATORY TRACT INFECTIOUS DISEASE: ICD-10-CM

## 2021-01-19 DIAGNOSIS — R06.02 SOB (SHORTNESS OF BREATH): ICD-10-CM

## 2021-01-20 RX ORDER — ALBUTEROL SULFATE 90 UG/1
2 AEROSOL, METERED RESPIRATORY (INHALATION) EVERY 6 HOURS
Qty: 1 INHALER | Refills: 0 | Status: SHIPPED | OUTPATIENT
Start: 2021-01-20 | End: 2021-02-17

## 2021-01-20 NOTE — TELEPHONE ENCOUNTER
Ventolin inhaler    Last filled 10/14/2020 by Urgent Care for the diagnosis of: SOB (shortness of breath), Lower respiratory tract infectious disease    Last OV: 12/18/2020  (VIRTUAL VISIT)    Talisha Rodriguez RN

## 2021-02-17 ENCOUNTER — OFFICE VISIT (OUTPATIENT)
Dept: FAMILY MEDICINE | Facility: CLINIC | Age: 61
End: 2021-02-17
Payer: COMMERCIAL

## 2021-02-17 VITALS
RESPIRATION RATE: 20 BRPM | DIASTOLIC BLOOD PRESSURE: 80 MMHG | SYSTOLIC BLOOD PRESSURE: 120 MMHG | HEART RATE: 100 BPM | BODY MASS INDEX: 31.89 KG/M2 | HEIGHT: 68 IN | WEIGHT: 210.4 LBS | TEMPERATURE: 97.8 F

## 2021-02-17 DIAGNOSIS — N63.0 LUMP OR MASS IN BREAST: Primary | ICD-10-CM

## 2021-02-17 PROCEDURE — 99213 OFFICE O/P EST LOW 20 MIN: CPT | Performed by: NURSE PRACTITIONER

## 2021-02-17 RX ORDER — ALBUTEROL SULFATE 90 UG/1
2 AEROSOL, METERED RESPIRATORY (INHALATION) EVERY 6 HOURS PRN
Qty: 8.5 G | Refills: 0 | Status: CANCELLED | OUTPATIENT
Start: 2021-02-17

## 2021-02-17 ASSESSMENT — MIFFLIN-ST. JEOR: SCORE: 1574.93

## 2021-02-17 NOTE — PROGRESS NOTES
"Assessment & Plan     Lump or mass in breast  Possibly an inflamed cyst, though redness not associated with nodule.  Will get imaging.  Tylenol/ibuprfen as needed for discomfort.  If any worsening or changing will let us know.  May need referral to breast center.   - MA Diagnostic Digital Bilateral; Future  - US Breast Left Complete 4 Quadrants; Future           Due for physical and offered to schedule this for her today.  She prefers to look at her calendar and will use [employer] to schedule future appt.     Return in about 4 weeks (around 3/17/2021) for Preventive Visit, In-Clinic Visit.    Rowena Frederick, DONALD CNP  [facility] [place]    Subjective     Joanne Padilla is a 60 year old female who presents to clinic today for the following health issues:    HPI     Concern - spot in between breasts  Onset: today   Description: red spot   Intensity: mild, 2/10  Progression of Symptoms:  worsening  Accompanying Signs & Symptoms: redness and warm to the touch  Previous history of similar problem: yes  Precipitating factors:        Worsened by: nothing   Alleviating factors:        Improved by: has not tried anything yet   Therapies tried and outcome:  none     Noticed a bump between breasts last night when going to bed.  This morning area is red.  No drainage.  Tender if she is pressing on the bump or her bra rubs over it otherwise not painful. Has had breast lumps removed in the past, benign.  Mom had breast cancer in her 40s.     Review of Systems   CONSTITUTIONAL:NEGATIVE for fever, chills  INTEGUMENTARY/SKIN: POSITIVE for see HPI      Objective    /80 (BP Location: Right arm, Patient Position: Sitting, Cuff Size: Adult Regular)   Pulse 100   Temp 97.8  F (36.6  C) (Oral)   Resp 20   Ht 1.715 m (5' 7.5\")   Wt 95.4 kg (210 lb 6.4 oz)   BMI 32.47 kg/m    Body mass index is 32.47 kg/m .  Physical Exam   GENERAL: healthy, alert and no distress  NECK: no adenopathy, no asymmetry, masses, " or scars and thyroid normal to palpation  BREAST: no tenderness or nipple discharge and no palpable axillary masses or adenopathy, ~ 1 cm slightly tender nodule overlying the sternum/very medial aspect of the L breast, there is a 4 cm x 4 cm area of erythema distal to the nodule, no warmth, induration, fluctuation or drainage     No results found for this or any previous visit (from the past 24 hour(s)).

## 2021-02-22 ENCOUNTER — TELEPHONE (OUTPATIENT)
Dept: MAMMOGRAPHY | Facility: CLINIC | Age: 61
End: 2021-02-22

## 2021-02-22 NOTE — TELEPHONE ENCOUNTER
Patient has new lump middle of breasts that is large and painful.  Was red, however redness has gone down. Area is painful and she voices worry about tolerating imaging.  Explain we will only do what she can tolerate, her PCP has ordered mammo and US and I suggested she call to schedule, if she can't tolerate mammogram we will help her with next steps.  Patient agrees.  Patient reports history of sebaceous cysts on her back.

## 2021-03-03 ENCOUNTER — OFFICE VISIT (OUTPATIENT)
Dept: FAMILY MEDICINE | Facility: CLINIC | Age: 61
End: 2021-03-03
Payer: COMMERCIAL

## 2021-03-03 VITALS
SYSTOLIC BLOOD PRESSURE: 102 MMHG | HEART RATE: 92 BPM | DIASTOLIC BLOOD PRESSURE: 78 MMHG | BODY MASS INDEX: 32.41 KG/M2 | OXYGEN SATURATION: 100 % | TEMPERATURE: 97.8 F | RESPIRATION RATE: 12 BRPM | WEIGHT: 210 LBS

## 2021-03-03 DIAGNOSIS — L72.3 INFLAMED SEBACEOUS CYST: Primary | ICD-10-CM

## 2021-03-03 DIAGNOSIS — Z72.0 TOBACCO ABUSE: ICD-10-CM

## 2021-03-03 PROCEDURE — 99214 OFFICE O/P EST MOD 30 MIN: CPT | Performed by: PHYSICIAN ASSISTANT

## 2021-03-03 RX ORDER — VARENICLINE TARTRATE 1 MG/1
1 TABLET, FILM COATED ORAL 2 TIMES DAILY
Qty: 60 TABLET | Refills: 1 | Status: SHIPPED | OUTPATIENT
Start: 2021-03-03 | End: 2021-10-26

## 2021-03-03 RX ORDER — CEPHALEXIN 500 MG/1
500 CAPSULE ORAL 3 TIMES DAILY
Qty: 30 CAPSULE | Refills: 0 | Status: SHIPPED | OUTPATIENT
Start: 2021-03-03 | End: 2021-03-13

## 2021-03-03 NOTE — PROGRESS NOTES
Assessment & Plan     Inflamed sebaceous cyst  Exam consistent with inflamed sebaceous cyst. I recommended continuing with imaging as this still could be related to breast tissue. Imaging would rule this out. Patient in agreement to move forward with imaging.  - cephALEXin (KEFLEX) 500 MG capsule; Take 1 capsule (500 mg) by mouth 3 times daily for 10 days    Tobacco abuse  Patient not quite ready to quit smoking. She is interested in Chantix. Sent for patient when she is ready.  - varenicline (CHANTIX ELVIRA) 0.5 MG X 11 & 1 MG X 42 tablet; Take 0.5 mg tab daily for 3 days, THEN 0.5 mg tab twice daily for 4 days, THEN 1 mg twice daily.  - varenicline (CHANTIX) 1 MG tablet; Take 1 tablet (1 mg) by mouth 2 times daily    Review of external notes as documented elsewhere in note  35 minutes spent on the date of the encounter doing chart review, history and exam, documentation and further activities as noted above       Yenny Iraheta PA-C  [facility]    Subjective       HPI     General Follow Up    Concern: spot on chest between breast  Problem started: follow up from 2/17/2021  Progression of symptoms: worse getting bigger   Description: spot is getting bigger (red), itchy and now warm to the touch, more painful    Patient seen on 2/17/21 for evaluation of same concern. At that time patient had noticed a lump that was tender when pressing on the area in between her breasts. The area is red and more enlarged.    Patient's mother has history of breast cancer in her 40's. Patient already has imaging of the breasts set up with mammogram and ultrasound next week.      Review of Systems   GENERAL:  No fevers        Objective    /78 (BP Location: Right arm, Patient Position: Chair, Cuff Size: Adult Large)   Pulse 92   Temp 97.8  F (36.6  C) (Oral)   Resp 12   Wt 95.3 kg (210 lb)   SpO2 100%   BMI 32.41 kg/m    Body mass index is 32.41 kg/m .  Physical Exam   GENERAL: No acute  distress  HEENT: Normocephalic  SKIN: 2.2 cm cystic appearing lump left medial breast  NEURO: Alert and non-focal

## 2021-03-04 ENCOUNTER — TELEPHONE (OUTPATIENT)
Dept: FAMILY MEDICINE | Facility: CLINIC | Age: 61
End: 2021-03-04

## 2021-03-04 NOTE — TELEPHONE ENCOUNTER
Visit required for testing but not sure I recommend. Venous levels are not super accurate and often smoking can obscure results. If she's like to discuss more can set up a visit.

## 2021-03-04 NOTE — TELEPHONE ENCOUNTER
Pt wants to get labs ordered for testing for carbon monoxide. Confirmed number in chart is best number. Mychart is okay too.

## 2021-03-09 ENCOUNTER — VIRTUAL VISIT (OUTPATIENT)
Dept: FAMILY MEDICINE | Facility: CLINIC | Age: 61
End: 2021-03-09
Payer: COMMERCIAL

## 2021-03-09 DIAGNOSIS — J30.9 ALLERGIC RHINITIS, UNSPECIFIED SEASONALITY, UNSPECIFIED TRIGGER: Primary | ICD-10-CM

## 2021-03-09 PROCEDURE — 99213 OFFICE O/P EST LOW 20 MIN: CPT | Mod: 95 | Performed by: PHYSICIAN ASSISTANT

## 2021-03-09 RX ORDER — FLUTICASONE PROPIONATE 50 MCG
1 SPRAY, SUSPENSION (ML) NASAL DAILY
Qty: 16 G | Refills: 1 | Status: SHIPPED | OUTPATIENT
Start: 2021-03-09 | End: 2022-07-07

## 2021-03-09 NOTE — PROGRESS NOTES
"Joanne is a 60 year old who is being evaluated via a billable telephone visit.      What phone number would you like to be contacted at? [phone]  How would you like to obtain your AVS? MyChart    Assessment & Plan     Allergic rhinitis, unspecified seasonality, unspecified trigger  I am not as suspicious for CO poisoning after today's discussion. Additionally, venous samples are unlikely to be helpful and she does smoke which could complicate things. Truly she'd probably have to get arterial testing to determine this. I do think she continues to undertreat her allergies, which does include dust. She has not been taking nasal spray or allegra. While these may not eradicate her symptoms, she should at the least start an oral and nasal treatment to mollify them. I have additionally recommended she follow up with allergy  - fluticasone (FLONASE) 50 MCG/ACT nasal spray; Spray 1 spray into both nostrils daily    BMI:   Estimated body mass index is 32.41 kg/m  as calculated from the following:    Height as of 2/17/21: 1.715 m (5' 7.5\").    Weight as of 3/3/21: 95.3 kg (210 lb).       Return in about 3 months (around 6/9/2021) for Physical Exam, Lab Work, PAP.    AZA[facility]   Joanne is a 60 year old who presents for the following health issues     HPI     Concerns: Possible Carbon Monoxide testing?  Joanne calls to discuss ongoing sinus pressure/drainage, headache, itching, cough and rash  She notes her apartment has a lot of dust   Does not think HVAC working properly   Apartment is also right above a parking garage; can smell exhaust  Apartment on first floor; thinks getting a lot of dust/contaminent that way as well  It has build up on clothes that are in the closet, furniture, etc   She works from home and her sons are also home school and experiencing the same symptoms.   She does continue to smoke      Review of Systems   Constitutional, " HEENT, cardiovascular, pulmonary, gi and gu systems are negative, except as otherwise noted.      Objective           Vitals:  No vitals were obtained today due to virtual visit.    Physical Exam   healthy, alert and no distress  PSYCH: Alert and oriented times 3; coherent speech, normal   rate and volume, able to articulate logical thoughts, able   to abstract reason, no tangential thoughts, no hallucinations   or delusions  Her affect is normal  RESP: No cough, no audible wheezing, able to talk in full sentences  Remainder of exam unable to be completed due to telephone visits          Phone call duration: 25 minutes

## 2021-03-18 DIAGNOSIS — F98.8 ATTENTION DEFICIT DISORDER (ADD) WITHOUT HYPERACTIVITY: ICD-10-CM

## 2021-03-18 RX ORDER — DEXTROAMPHETAMINE SACCHARATE, AMPHETAMINE ASPARTATE, DEXTROAMPHETAMINE SULFATE AND AMPHETAMINE SULFATE 5; 5; 5; 5 MG/1; MG/1; MG/1; MG/1
TABLET ORAL
Qty: 60 TABLET | Refills: 0 | Status: SHIPPED | OUTPATIENT
Start: 2021-03-18 | End: 2021-04-16

## 2021-03-18 NOTE — TELEPHONE ENCOUNTER
Adderall 20 mg    Last Written Prescription Date:  2/18/2021  Last Fill Quantity: 60,  # refills: 0   Last office visit: 2/17/2021 with prescribing provider:  CONNIE 12/28/2020 RTC 3 months for px  Future Office Visit:      [place]  No concerns.    Radha Arguello RN

## 2021-03-20 ENCOUNTER — HEALTH MAINTENANCE LETTER (OUTPATIENT)
Age: 61
End: 2021-03-20

## 2021-04-16 DIAGNOSIS — F98.8 ATTENTION DEFICIT DISORDER (ADD) WITHOUT HYPERACTIVITY: ICD-10-CM

## 2021-04-16 RX ORDER — DEXTROAMPHETAMINE SACCHARATE, AMPHETAMINE ASPARTATE, DEXTROAMPHETAMINE SULFATE AND AMPHETAMINE SULFATE 5; 5; 5; 5 MG/1; MG/1; MG/1; MG/1
TABLET ORAL
Qty: 60 TABLET | Refills: 0 | Status: SHIPPED | OUTPATIENT
Start: 2021-04-16 | End: 2021-05-18

## 2021-04-16 NOTE — TELEPHONE ENCOUNTER
amphetamine-dextroamphetamine (ADDERALL) 20 MG tablet/  Last Written Prescription Date:  3/18/21  Last Fill Quantity: 60,   # refills: 0  Last Office Visit: 3/9/21  Future Office visit:       Routing refill request to provider for review/approval because:  Drug not on the [facility], [facility] or [facility] refill protocol or controlled substance    Madeline Reina RN on 4/16/2021 at 2:03 PM

## 2021-05-11 ENCOUNTER — TELEPHONE (OUTPATIENT)
Dept: FAMILY MEDICINE | Facility: CLINIC | Age: 61
End: 2021-05-11

## 2021-05-11 NOTE — TELEPHONE ENCOUNTER
Patient Quality Outreach      Summary:    Patient has the following on her problem list/HM: None    Patient is due/failing the following:   Colonoscopy, Breast Cancer Screening - Mammogram and Cervical Cancer Screening - PAP Needed    Type of outreach:    Sent [employer] message.    Questions for provider review:    None                                                                                                                                     Keke Hogan CMA       Chart routed to Care Team.

## 2021-05-11 NOTE — LETTER
May 25, 2021      Joanne Padilla  [address]      [place] [address]        We care about your health and have reviewed your health plan including your medical conditions, medications, and lab results.  Based on this review, it is recommended that you follow up regarding the following health topic(s):  -Breast Cancer Screening  -Colon Cancer Screening  -Cervical Cancer Screening     Please call us at the [facility] - [phone] (or use [employer]) to address the above recommendations.     Thank you for trusting [facility] and we appreciate the opportunity to serve you.  We look forward to supporting your healthcare needs in the future.    Healthy Regards,    Your Health Care Team  [facility]          Sincerely,     Fermín Larios PA-C

## 2021-05-17 DIAGNOSIS — F98.8 ATTENTION DEFICIT DISORDER (ADD) WITHOUT HYPERACTIVITY: ICD-10-CM

## 2021-05-18 RX ORDER — DEXTROAMPHETAMINE SACCHARATE, AMPHETAMINE ASPARTATE, DEXTROAMPHETAMINE SULFATE AND AMPHETAMINE SULFATE 5; 5; 5; 5 MG/1; MG/1; MG/1; MG/1
TABLET ORAL
Qty: 60 TABLET | Refills: 0 | Status: SHIPPED | OUTPATIENT
Start: 2021-05-18 | End: 2021-06-10

## 2021-05-18 NOTE — TELEPHONE ENCOUNTER
amphetamine-dextroamphetamine (ADDERALL) 20 MG tablet  Last Written Prescription Date:  4/16/21  Last Fill Quantity: 60,   # refills: 0  Last Office Visit: 3/9/21  Future Office visit:       Routing refill request to provider for review/approval because:  Drug not on the [facility], [facility] or [facility] refill protocol or controlled substance    Madeline Reina RN on 5/18/2021 at 3:28 PM

## 2021-05-25 NOTE — TELEPHONE ENCOUNTER
Patient Quality Outreach 2nd Attempt      Summary:    Type of outreach:    Sent letter.    Next Steps:  Reach out within 90 days via Letter.    Max number of attempts reached: Yes. Will try again in 90 days if patient still on fail list.    Questions for provider review:    None                                                                                                                    Renetta CELESTE    [facility]       Chart routed to Care Team.

## 2021-06-10 ENCOUNTER — MYC REFILL (OUTPATIENT)
Dept: FAMILY MEDICINE | Facility: CLINIC | Age: 61
End: 2021-06-10

## 2021-06-10 DIAGNOSIS — F98.8 ATTENTION DEFICIT DISORDER (ADD) WITHOUT HYPERACTIVITY: ICD-10-CM

## 2021-06-10 RX ORDER — DEXTROAMPHETAMINE SACCHARATE, AMPHETAMINE ASPARTATE, DEXTROAMPHETAMINE SULFATE AND AMPHETAMINE SULFATE 5; 5; 5; 5 MG/1; MG/1; MG/1; MG/1
20 TABLET ORAL 2 TIMES DAILY
Qty: 60 TABLET | Refills: 0 | Status: SHIPPED | OUTPATIENT
Start: 2021-06-10 | End: 2021-07-15

## 2021-06-10 NOTE — TELEPHONE ENCOUNTER
Routing refill request to provider for review/approval because:  Drug not on the [facility] refill protocol     Jose IGNACIO RN

## 2021-07-15 ENCOUNTER — MYC REFILL (OUTPATIENT)
Dept: FAMILY MEDICINE | Facility: CLINIC | Age: 61
End: 2021-07-15

## 2021-07-15 DIAGNOSIS — J30.9 ALLERGIC RHINITIS, UNSPECIFIED SEASONALITY, UNSPECIFIED TRIGGER: ICD-10-CM

## 2021-07-15 DIAGNOSIS — F98.8 ATTENTION DEFICIT DISORDER (ADD) WITHOUT HYPERACTIVITY: ICD-10-CM

## 2021-07-16 RX ORDER — DEXTROAMPHETAMINE SACCHARATE, AMPHETAMINE ASPARTATE, DEXTROAMPHETAMINE SULFATE AND AMPHETAMINE SULFATE 5; 5; 5; 5 MG/1; MG/1; MG/1; MG/1
20 TABLET ORAL 2 TIMES DAILY
Qty: 60 TABLET | Refills: 0 | Status: SHIPPED | OUTPATIENT
Start: 2021-07-16 | End: 2021-08-16

## 2021-07-16 RX ORDER — ALBUTEROL SULFATE 90 UG/1
2 AEROSOL, METERED RESPIRATORY (INHALATION) EVERY 6 HOURS PRN
Qty: 8.5 G | Refills: 0 | Status: SHIPPED | OUTPATIENT
Start: 2021-07-16 | End: 2021-09-14

## 2021-07-16 NOTE — TELEPHONE ENCOUNTER
amphetamine-dextroamphetamine (ADDERALL) 20 MG tablet   Last Written Prescription Date:  6/10/21  Last Fill Quantity: 60,   # refills: 0  Last Office Visit: 3/9/21  Future Office visit:       Routing refill request to provider for review/approval because:  Drug not on the [facility], [facility] or [facility] refill protocol or controlled substance      albuterol (VENTOLIN HFA) 108 (90 Base) MCG/ACT inhaler  Last Written Prescription Date:  4/12/19  Last Fill Quantity: 8.5,   # refills: 0  Last Office Visit: 3/9/21  Future Office visit:       Routing refill request to provider for review/approval because:  Asthma control assessment score within normal limits in last 6 months    Madeline Reina RN on 7/16/2021 at 10:49 AM

## 2021-07-19 ENCOUNTER — OFFICE VISIT (OUTPATIENT)
Dept: URGENT CARE | Facility: URGENT CARE | Age: 61
End: 2021-07-19
Payer: COMMERCIAL

## 2021-07-19 VITALS
WEIGHT: 206.7 LBS | HEART RATE: 94 BPM | BODY MASS INDEX: 31.9 KG/M2 | SYSTOLIC BLOOD PRESSURE: 110 MMHG | TEMPERATURE: 98.2 F | DIASTOLIC BLOOD PRESSURE: 77 MMHG | OXYGEN SATURATION: 95 %

## 2021-07-19 DIAGNOSIS — J01.90 ACUTE SINUSITIS WITH SYMPTOMS > 10 DAYS: Primary | ICD-10-CM

## 2021-07-19 DIAGNOSIS — Z72.0 TOBACCO ABUSE: ICD-10-CM

## 2021-07-19 DIAGNOSIS — J02.9 SORE THROAT: ICD-10-CM

## 2021-07-19 LAB — DEPRECATED S PYO AG THROAT QL EIA: NEGATIVE

## 2021-07-19 PROCEDURE — 87651 STREP A DNA AMP PROBE: CPT | Performed by: PHYSICIAN ASSISTANT

## 2021-07-19 PROCEDURE — 99214 OFFICE O/P EST MOD 30 MIN: CPT | Performed by: PHYSICIAN ASSISTANT

## 2021-07-19 NOTE — PROGRESS NOTES
SUBJECTIVE:  Joanne Padilla is a 61 year old female here with concerns about sinus infection.  She states onset of symptoms were 1 week(s) ago.  She has had maxillary, tooth pain, frontal pressure. Course of illness is same. Severity moderate  Current and Associated symptoms: nasal congestion, rhinorrhea, sore throat, facial pain/pressure, headache, cough, and post-nasal drainage  Predisposing factors include HX of sinusitis. Recent treatment has included: albuterol, sinus rinse, advil    Past Medical History:   Diagnosis Date     Allergic rhinitis, cause unspecified      Anxiety state, unspecified      Atopic dermatitis 10/10/2011     Cardiac dysrhythmia, unspecified      Chronic rhinitis 10/10/2011     Colitis      Exhaustion due to excessive exertion(994.5)      Gallstones      Hypercholesterolemia      Insomnia, unspecified      Major depressive disorder, single episode, unspecified      Migraines 12/17/2012     Osteoarthritis of CMC joint of thumb      Other acne      Other malaise and fatigue      Palpitations      PONV (postoperative nausea and vomiting)      Uncomplicated asthma     Occasional     Social History     Tobacco Use     Smoking status: Light Tobacco Smoker     Packs/day: 0.50     Years: 15.00     Pack years: 7.50     Types: Cigarettes     Smokeless tobacco: Never Used     Tobacco comment: I am a stress smoker but I would like to quit.   Substance Use Topics     Alcohol use: No       ROS:  ROS negative except as listed above      OBJECTIVE:  /77   Pulse 94   Temp 98.2  F (36.8  C)   Wt 93.8 kg (206 lb 11.2 oz)   SpO2 95%   BMI 31.90 kg/m    Exam:GENERAL APPEARANCE: healthy, alert and no distress  EYES: EOMI,  PERRL, conjunctiva clear  HENT: ear canals and TM's normal.  Nose and mouth without ulcers, erythema or lesions  NECK: supple, nontender, no lymphadenopathy  RESP: lungs clear to auscultation - no rales, rhonchi or wheezes  CV: regular rates and rhythm, normal S1 S2, no  murmur noted  NEURO: Normal strength and tone, sensory exam grossly normal,  normal speech and mentation  SKIN: no suspicious lesions or rashes      Results for orders placed or performed in visit on 07/19/21   Streptococcus A Rapid Screen w/Reflex to PCR     Status: Normal    Specimen: Throat; Swab   Result Value Ref Range    Group A Strep antigen Negative Negative       ASSESSMENT:  (J01.90) Acute sinusitis with symptoms > 10 days  (primary encounter diagnosis)  Plan: amoxicillin-clavulanate (AUGMENTIN) 875-125 MG         tablet      (J02.9) Sore throat  Plan: Streptococcus A Rapid Screen w/Reflex to PCR,         Symptomatic COVID-19 Virus (Coronavirus) by PCR        Nasopharyngeal, Group A Streptococcus PCR         Throat Swab      (Z72.0) Tobacco abuse  Plan: discontinue through duration of symptoms      Patient Instructions         1.  Plenty of fluids, rest, warm compresses on face  2.  Mucinex twice daily for at least 4 days  3.  Paula Pot 1x in the morning 1x at night (SALINE MIST SPRAY IS AN ACCEPTABLE, THOUGH NOT AS EFFECTIVE REPLACEMENT)  4.  Benadryl (diphenhydramine) at bedtime   5.  Either Claritin (Loratadine), Allegra (Fexofenadine), or Zyrtec (Cetirizine) in the day  6.  Flonase (Fluticasone) 2x each nostril twice a day for two weeks, then once each nostril once a day  7. Antibiotic twice daily for 7 Days  8. Probiotic (ie Culturelle) 1-2 hours after each antibiotic dose     Please let us know if symptoms persist, or worsen.      Patient Education     Self-Care for Sinusitis     Drinking plenty of water can help sinuses drain.   Sinusitis can often be managed with self-care. Self-care can keep sinuses moist and make you feel more comfortable. Remember to follow your doctor's instructions closely. This can make a big difference in getting your sinus problem under control.  Drink fluids  Drinking extra fluids helps thin your mucus. This lets it drain from your sinuses more easily. Have a glass of  water every hour or two. A humidifier helps in much the same way. Fluids can also offset the drying effects of certain medicines. If you use a humidifier, follow the product maker's instructions on how to use it. Clean it on a regular schedule.  Use saltwater rinses  Rinses help keep your sinuses and nose moist. Mix a teaspoon of salt in 8 ounces of fresh, warm water. Use a bulb syringe to gently squirt the water into your nose a few times a day. You can also buy ready-made saline nasal sprays.  Apply hot or cold packs  Applying heat to the area surrounding your sinuses may make you feel more comfortable. Use a hot water bottle or a hand towel dipped in hot water. Some people also find ice packs effective for relieving pain.  Medicines  Your doctor may prescribe medications to help treat your sinusitis. If you have an infection, antibiotics can help clear it up. If you are prescribed antibiotics, take all pills on schedule until they are gone, even if you feel better. Decongestants help relieve swelling. Use decongestant sprays for short periods only under the direction of your doctor. If you have allergies, your doctor may prescribe medications to help relieve them.   Date Last Reviewed: 10/1/2016    9977-8010 [employer]. [address] [place] [address], [place] [address]. All rights reserved. This information is not intended as a substitute for professional medical care. Always follow your healthcare professional's instructions.    Follow up immediately with severe headache, chest pain, or shortness of breath    Rest, isolate for 10 days, hydrate, follow up if worsening or new symptoms  Household members to isolate until test results, if positive isolate for 2 weeks and follow up for testing if symptoms occur         Patient Education     Coronavirus Disease 2019 (COVID-19): Caring for Yourself or Others   If you or a household member have symptoms of COVID-19, follow the guidelines below. This will help you  manage symptoms and keep the virus from spreading.  If you have symptoms of COVID-19    Stay home and contact your care team. They will tell you what to do.    Don t panic. Keep in mind that other illnesses can cause similar symptoms.    Stay away from work, school, and public places.    Limit physical contact with others in your home. Limit visitors. No kissing.  Clean surfaces you touch with disinfectant.  If you need to cough or sneeze, do it into a tissue. Then throw the tissue into the trash. If you don't have tissues, cough or sneeze into the bend of your elbow.  Don t share food or personal items with people in your household. This includes items like eating and drinking utensils, towels, and bedding.  Wear a cloth face mask around other people. During a public health emergency, medical face masks may be reserved for healthcare workers. You may need to make a cloth face mask of your own. You can do this using a bandana, T-shirt, or other cloth. The [facility] has instructions on how to make a face mask. Wear the mask so that it covers both your nose and mouth.  If you need to go to a hospital or clinic, call ahead to let them know. Expect the care team to wear masks, gowns, gloves, and eye protection. You may need to come to a different entrance or wait in a separate room.  Follow all instructions from your care team.    If you ve been diagnosed with COVID-19    Stay home and away from others, including other people in your home. (This is called self-isolation.) Don t leave home unless you need to get medical care. Don t go to work, school, or public places. Don t use buses, taxis, or other public transportation.    Follow all instructions from your care team.    If you need to go to a hospital or clinic, call ahead to let them know. Expect the care team to wear masks, gowns, gloves, and eye protection. You may need to come to a different entrance or wait in a separate room.    Wear a face mask over your nose and  mouth. This is to protect others from your germs. If you can t wear a mask, your caregivers should wear one. You may need to make your own mask using a bandana, T-shirt, or other cloth. See the CDC s instructions on how to make a face mask.    Have no contact with pets and other animals.    Don t share food or personal items with people in your household. This includes items like eating and drinking utensils, towels, and bedding.    If you need to cough or sneeze, do it into a tissue. Then throw the tissue into the trash. If you don't have tissues, cough or sneeze into the bend of your elbow.    Wash your hands often.    Self-care at home   At this time, there is no medicine approved to prevent or treat COVID-19. The FDA has approved an antiviral medicine (called remdesivir) for people being treated in the hospital. This is for people 12 years and older who weigh more than about 88 pounds (40 kgs). In certain cases, it may also be used for people younger than 12 years or who weigh less than about 88 pounds (40 kgs)..  Currently, treatment is mostly aimed at helping your body while it fights the virus.    Getting extra rest.    Drink extra fluids 6 to 8 glasses of liquids each day), unless a doctor has told you not to. Ask your care team which fluids are best for you. Avoid fluids that contain caffeine or alcohol.    Taking over-the-counter (OTC) medicine to reduce pain and fever. Your care team will tell you which medicine to use.  If you ve been in the hospital for COVID-19, follow your care team s instructions. They will tell you when to stop self-isolation. They may also have you change positions to help your breathing (such as lying on your belly).  If a test showed that you have COVID-19, you may be asked to donate plasma after you ve recovered. (This is called COVID-19 convalescent plasma donation.) The plasma may contain antibodies to help fight the virus in others. Experts don't know if the plasma will work  well as a treatment. Research continues, and the FDA has approved it for emergency use in certain people with serious or life-threatening COVID-19. For more information, talk to your care team.  Caring for a sick person     Follow all instructions from the care team.    Wash your hands often.    Wear protective clothing as advised.    Make sure the sick person wears a mask. If they can't wear a mask, don t stay in the same room with them. If you must be in the same room, wear a face mask. Make sure the mask covers both the nose and mouth.    Keep track of the sick person s symptoms.    Clean surfaces often with disinfectant. This includes phones, kitchen counters, fridge door handles, bathroom surfaces, and others.    Don t let anyone share household items with the sick person. This includes eating and drinking tools, towels, sheets, or blankets.    Clean fabrics and laundry well.    Keep other people and pets away from the sick person.    When you can stop self-isolation  When you are sick with COVID-19, you should stay away from other people. This is called self-isolation. The rules for ending self-isolation depend on your health in general.  If you are normally healthy:  You can stop self-isolation when all 3 of these are true:    You ve had no fever--and no medicine that reduces fever--for at least 24 hours. And     Your symptoms are better, such as cough or trouble breathing. And     At least 10 days have passed since your symptoms first started.  Talk with your care team before you leave home. They may tell you it s okay to leave, or they may give you different advice. You do not need to be re-tested.  If you have a weak immune system, or you ve had severe COVID-19:  Follow your care team s instructions. You may be asked to self-isolate for 10 days to 20 days after your symptoms first started. Your care team may want to re-test you for COVID-19.  Note: If you re being treated for cancer, have an immune  disorder (such as HIV), or have had a transplant (organ or bone marrow), you may have a weak immune system.  If you've already had COVID-19 once:  If you had the virus over 3 months ago, and you ve been exposed again, treat it like you've never had COVID-19. Stay home, limit your contact with others, call your care team, and watch for symptoms.  If it s been less than 3 months since you had the virus, you re symptom-free, and you ve been exposed again: You don t need to self-isolate or be re-tested. But if you develop new symptoms that can t be linked to another illness, please self-isolate and contact your care team.  When you return to public settings  When you are well enough to go outside your home, follow the CDC s guidance on cloth face masks.    Anyone over age 2 should wear a face mask in public, especially when it's hard to socially distance. This includes public transit, public protests and marches, and crowded stores, bars, and restaurants.    Face masks are most likely to reduce the spread of COVID-19 when they are widely used by people who are out in the public.  Certain people should not wear a face covering. These include:    Children under 2 years old    Anyone with a health, developmental, or mental health condition that can be made worse by wearing a mask    Anyone who is unconscious or unable to remove the face covering without help. See the CDC's guidance on who should not wear a face mask.  When to call your care team  Call your care team right away if a sick person has any of these:    Trouble breathing    Pain or pressure in chest  If a sick person has any of these, call 911:    Trouble breathing that gets worse    Pain or pressure in chest that gets worse    Blue tint to lips or face    Fast or irregular heartbeat    Confusion or trouble waking    Fainting or loss of consciousness    Coughing up blood  Going home from the hospital   If you have COVID-19 and were recently in the  hospital:    Follow the instructions above for self-care and isolation.    Follow the hospital care team s instructions.    Ask questions if anything is unclear to you. Write down answers so you remember them.  Date last modified: 11/23/2020  Antonio last reviewed this educational content on 4/1/2020  This information has been modified by your health care provider with permission from the publisher.    2957-2002 [employer]. [address] [place] [address], [place] [address]. All rights reserved. This information is not intended as a substitute for professional medical care. Always follow your healthcare professional's instructions.

## 2021-07-19 NOTE — PATIENT INSTRUCTIONS
1.  Plenty of fluids, rest, warm compresses on face  2.  Mucinex twice daily for at least 4 days  3.  Paula Pot 1x in the morning 1x at night (SALINE MIST SPRAY IS AN ACCEPTABLE, THOUGH NOT AS EFFECTIVE REPLACEMENT)  4.  Benadryl (diphenhydramine) at bedtime   5.  Either Claritin (Loratadine), Allegra (Fexofenadine), or Zyrtec (Cetirizine) in the day  6.  Flonase (Fluticasone) 2x each nostril twice a day for two weeks, then once each nostril once a day  7. Antibiotic twice daily for 7 Days  8. Probiotic (ie Culturelle) 1-2 hours after each antibiotic dose     Please let us know if symptoms persist, or worsen.      Patient Education     Self-Care for Sinusitis     Drinking plenty of water can help sinuses drain.   Sinusitis can often be managed with self-care. Self-care can keep sinuses moist and make you feel more comfortable. Remember to follow your doctor's instructions closely. This can make a big difference in getting your sinus problem under control.  Drink fluids  Drinking extra fluids helps thin your mucus. This lets it drain from your sinuses more easily. Have a glass of water every hour or two. A humidifier helps in much the same way. Fluids can also offset the drying effects of certain medicines. If you use a humidifier, follow the product maker's instructions on how to use it. Clean it on a regular schedule.  Use saltwater rinses  Rinses help keep your sinuses and nose moist. Mix a teaspoon of salt in 8 ounces of fresh, warm water. Use a bulb syringe to gently squirt the water into your nose a few times a day. You can also buy ready-made saline nasal sprays.  Apply hot or cold packs  Applying heat to the area surrounding your sinuses may make you feel more comfortable. Use a hot water bottle or a hand towel dipped in hot water. Some people also find ice packs effective for relieving pain.  Medicines  Your doctor may prescribe medications to help treat your sinusitis. If you have an infection,  antibiotics can help clear it up. If you are prescribed antibiotics, take all pills on schedule until they are gone, even if you feel better. Decongestants help relieve swelling. Use decongestant sprays for short periods only under the direction of your doctor. If you have allergies, your doctor may prescribe medications to help relieve them.   Date Last Reviewed: 10/1/2016    8694-4466 [employer]. [address] [place] [address], [place] [address]. All rights reserved. This information is not intended as a substitute for professional medical care. Always follow your healthcare professional's instructions.    Follow up immediately with severe headache, chest pain, or shortness of breath    Rest, isolate for 10 days, hydrate, follow up if worsening or new symptoms  Household members to isolate until test results, if positive isolate for 2 weeks and follow up for testing if symptoms occur         Patient Education     Coronavirus Disease 2019 (COVID-19): Caring for Yourself or Others   If you or a household member have symptoms of COVID-19, follow the guidelines below. This will help you manage symptoms and keep the virus from spreading.  If you have symptoms of COVID-19    Stay home and contact your care team. They will tell you what to do.    Don t panic. Keep in mind that other illnesses can cause similar symptoms.    Stay away from work, school, and public places.    Limit physical contact with others in your home. Limit visitors. No kissing.  Clean surfaces you touch with disinfectant.  If you need to cough or sneeze, do it into a tissue. Then throw the tissue into the trash. If you don't have tissues, cough or sneeze into the bend of your elbow.  Don t share food or personal items with people in your household. This includes items like eating and drinking utensils, towels, and bedding.  Wear a cloth face mask around other people. During a public health emergency, medical face masks may be reserved for  healthcare workers. You may need to make a cloth face mask of your own. You can do this using a bandana, T-shirt, or other cloth. The [facility] has instructions on how to make a face mask. Wear the mask so that it covers both your nose and mouth.  If you need to go to a hospital or clinic, call ahead to let them know. Expect the care team to wear masks, gowns, gloves, and eye protection. You may need to come to a different entrance or wait in a separate room.  Follow all instructions from your care team.    If you ve been diagnosed with COVID-19    Stay home and away from others, including other people in your home. (This is called self-isolation.) Don t leave home unless you need to get medical care. Don t go to work, school, or public places. Don t use buses, taxis, or other public transportation.    Follow all instructions from your care team.    If you need to go to a hospital or clinic, call ahead to let them know. Expect the care team to wear masks, gowns, gloves, and eye protection. You may need to come to a different entrance or wait in a separate room.    Wear a face mask over your nose and mouth. This is to protect others from your germs. If you can t wear a mask, your caregivers should wear one. You may need to make your own mask using a bandana, T-shirt, or other cloth. See the CDC s instructions on how to make a face mask.    Have no contact with pets and other animals.    Don t share food or personal items with people in your household. This includes items like eating and drinking utensils, towels, and bedding.    If you need to cough or sneeze, do it into a tissue. Then throw the tissue into the trash. If you don't have tissues, cough or sneeze into the bend of your elbow.    Wash your hands often.    Self-care at home   At this time, there is no medicine approved to prevent or treat COVID-19. The FDA has approved an antiviral medicine (called remdesivir) for people being treated in the hospital. This is  for people 12 years and older who weigh more than about 88 pounds (40 kgs). In certain cases, it may also be used for people younger than 12 years or who weigh less than about 88 pounds (40 kgs)..  Currently, treatment is mostly aimed at helping your body while it fights the virus.    Getting extra rest.    Drink extra fluids 6 to 8 glasses of liquids each day), unless a doctor has told you not to. Ask your care team which fluids are best for you. Avoid fluids that contain caffeine or alcohol.    Taking over-the-counter (OTC) medicine to reduce pain and fever. Your care team will tell you which medicine to use.  If you ve been in the hospital for COVID-19, follow your care team s instructions. They will tell you when to stop self-isolation. They may also have you change positions to help your breathing (such as lying on your belly).  If a test showed that you have COVID-19, you may be asked to donate plasma after you ve recovered. (This is called COVID-19 convalescent plasma donation.) The plasma may contain antibodies to help fight the virus in others. Experts don't know if the plasma will work well as a treatment. Research continues, and the FDA has approved it for emergency use in certain people with serious or life-threatening COVID-19. For more information, talk to your care team.  Caring for a sick person     Follow all instructions from the care team.    Wash your hands often.    Wear protective clothing as advised.    Make sure the sick person wears a mask. If they can't wear a mask, don t stay in the same room with them. If you must be in the same room, wear a face mask. Make sure the mask covers both the nose and mouth.    Keep track of the sick person s symptoms.    Clean surfaces often with disinfectant. This includes phones, kitchen counters, fridge door handles, bathroom surfaces, and others.    Don t let anyone share household items with the sick person. This includes eating and drinking tools,  towels, sheets, or blankets.    Clean fabrics and laundry well.    Keep other people and pets away from the sick person.    When you can stop self-isolation  When you are sick with COVID-19, you should stay away from other people. This is called self-isolation. The rules for ending self-isolation depend on your health in general.  If you are normally healthy:  You can stop self-isolation when all 3 of these are true:    You ve had no fever--and no medicine that reduces fever--for at least 24 hours. And     Your symptoms are better, such as cough or trouble breathing. And     At least 10 days have passed since your symptoms first started.  Talk with your care team before you leave home. They may tell you it s okay to leave, or they may give you different advice. You do not need to be re-tested.  If you have a weak immune system, or you ve had severe COVID-19:  Follow your care team s instructions. You may be asked to self-isolate for 10 days to 20 days after your symptoms first started. Your care team may want to re-test you for COVID-19.  Note: If you re being treated for cancer, have an immune disorder (such as HIV), or have had a transplant (organ or bone marrow), you may have a weak immune system.  If you've already had COVID-19 once:  If you had the virus over 3 months ago, and you ve been exposed again, treat it like you've never had COVID-19. Stay home, limit your contact with others, call your care team, and watch for symptoms.  If it s been less than 3 months since you had the virus, you re symptom-free, and you ve been exposed again: You don t need to self-isolate or be re-tested. But if you develop new symptoms that can t be linked to another illness, please self-isolate and contact your care team.  When you return to public settings  When you are well enough to go outside your home, follow the CDC s guidance on cloth face masks.    Anyone over age 2 should wear a face mask in public, especially when it's  hard to socially distance. This includes public transit, public protests and marches, and crowded stores, bars, and restaurants.    Face masks are most likely to reduce the spread of COVID-19 when they are widely used by people who are out in the public.  Certain people should not wear a face covering. These include:    Children under 2 years old    Anyone with a health, developmental, or mental health condition that can be made worse by wearing a mask    Anyone who is unconscious or unable to remove the face covering without help. See the CDC's guidance on who should not wear a face mask.  When to call your care team  Call your care team right away if a sick person has any of these:    Trouble breathing    Pain or pressure in chest  If a sick person has any of these, call 911:    Trouble breathing that gets worse    Pain or pressure in chest that gets worse    Blue tint to lips or face    Fast or irregular heartbeat    Confusion or trouble waking    Fainting or loss of consciousness    Coughing up blood  Going home from the hospital   If you have COVID-19 and were recently in the hospital:    Follow the instructions above for self-care and isolation.    Follow the hospital care team s instructions.    Ask questions if anything is unclear to you. Write down answers so you remember them.  Date last modified: 11/23/2020  "[employer] last reviewed this educational content on 4/1/2020  This information has been modified by your health care provider with permission from the publisher.    2787-3990 [employer]. [address] [place] [address], [place] [address]. All rights reserved. This information is not intended as a substitute for professional medical care. Always follow your healthcare professional's instructions.

## 2021-07-20 LAB — GROUP A STREP BY PCR: NOT DETECTED

## 2021-08-16 ENCOUNTER — MYC REFILL (OUTPATIENT)
Dept: FAMILY MEDICINE | Facility: CLINIC | Age: 61
End: 2021-08-16

## 2021-08-16 DIAGNOSIS — F98.8 ATTENTION DEFICIT DISORDER (ADD) WITHOUT HYPERACTIVITY: ICD-10-CM

## 2021-08-16 RX ORDER — DEXTROAMPHETAMINE SACCHARATE, AMPHETAMINE ASPARTATE, DEXTROAMPHETAMINE SULFATE AND AMPHETAMINE SULFATE 5; 5; 5; 5 MG/1; MG/1; MG/1; MG/1
20 TABLET ORAL 2 TIMES DAILY
Qty: 60 TABLET | Refills: 0 | Status: SHIPPED | OUTPATIENT
Start: 2021-08-16 | End: 2021-09-14

## 2021-08-24 ENCOUNTER — TELEPHONE (OUTPATIENT)
Dept: FAMILY MEDICINE | Facility: CLINIC | Age: 61
End: 2021-08-24

## 2021-08-24 NOTE — LETTER
[facility]  [address] [facility] [address] [phone]       September 7, 2021    Joanne Padilla  [address] [place] [address]      [facility] [address]    Dear Joanne,    We care about your health and have reviewed your health plan and are making recommendations based on this review, to optimize your health.    You are in particular need of attention regarding:  -Breast Cancer Screening  -Colon Cancer Screening  -Cervical Cancer Screening    We are recommending that you:                                                                                                                 -schedule a MAMMOGRAM which is due.         1 in 8 women will develop invasive breast cancer during her lifetime and it is the most common non-skin cancer in American women.  EARLY detection, new treatments, and a better understanding of the disease have increased survival rates - the 5 year survival rate in the 1960s was 63% and today it is close to 90%.    If you are under/uninsured, we recommend you contact the [facility]. They offer mammograms at no charge or on a sliding fee charge. You can schedule with them at [phone]. Please have them send us the results.    Please disregard this reminder if you have had this exam elsewhere within the last year.  It would be helpful for us to have a copy of your mammogram report in our file so that we can best coordinate your care - please contact us with when your test was done so we can update your record.                                                                                                                      -schedule a COLONOSCOPY to look for colon cancer (due every 10 years or 5 years in higher risk situations.)        Colon cancer is now the second leading cause of cancer-related deaths in the [place] for both men and women and there are over 130,000 new cases and 50,000 deaths per year from colon cancer.  Colonoscopies  can prevent 90-95% of these deaths.  Problem lesions can be removed before they ever become cancer.  This test is not only looking for cancer, but also getting rid of precancerious lesions.      If you are under/uninsured, we recommend you contact the [facility] program. [facility] is a free colorectal cancer screening program that provides colonoscopies for eligible under/uninsured [place] men and women. If you are interested in receiving a free colonoscopy, please call [facility] at [phone] (mention code [employer]) to see if you re eligible.     If you do not wish to do a colonoscopy or cannot afford to do one, at this time, there is another option. It is called a FIT test or Fecal Immunochemical Occult Blood Test (take home stool sample kit).  It does not replace the colonoscopy for colorectal cancer screening, but it can detect hidden bleeding in the lower colon.  It does need to be repeated every year and if a positive result is obtained, you would be referred for a colonoscopy.       If you have completed either one of these tests at another facility, please call with the details of when and where the tests were done and if they were normal or not. Or have the records sent to our clinic so that we can best coordinate your care.    -schedule a PAP SMEAR EXAM which is due.  Please disregard this reminder if you have had this exam elsewhere within the last year.  It would be helpful for us to have a copy of your recent pap smear report in our file so that we can best coordinate your care.        If you are under/uninsured, we recommend you contact the [facility]. They offer pap smears at no charge or on a sliding fee charge. You can schedule with them at [phone]. Please have them send us the results.                                                                                            In addition, here is a list of due or overdue Health Maintenance reminders.    Health Maintenance Due    Topic Date Due     Preventive Care Visit  Never done     ANNUAL REVIEW OF HM ORDERS  Never done     Discuss Advance Care Planning  Never done     Pneumococcal Vaccine (1 of 2 - PPSV23) Never done     Colorectal Cancer Screening  Never done     HIV Screening  Never done     Hepatitis C Screening  Never done     Cholesterol Lab  Never done     Zoster (Shingles) Vaccine (1 of 2) Never done     PAP Smear  04/28/2017     Mammogram  01/04/2021     COVID-19 Vaccine (2 - Pfizer 2-dose series) 07/30/2021     Flu Vaccine (1) 09/01/2021       To address the above recommendations, we encourage you to contact us at [phone], via [employer] (acquired by [employer] in 2014) or by contacting Central Scheduling toll free at [phone] 24 hours a day. They will assist you with finding the most convenient time and location.    Thank you for trusting [facility] and we appreciate the opportunity to serve you.  We look forward to supporting your healthcare needs in the future.    Healthy Regards,    Your [facility] Team

## 2021-08-24 NOTE — TELEPHONE ENCOUNTER
Patient Quality Outreach      Summary:    Patient has the following on her problem list/HM: None    Patient is due/failing the following:   Colonoscopy, Breast Cancer Screening - Mammogram and Cervical Cancer Screening - PAP Needed    Type of outreach:    Sent [employer] message.    Questions for provider review:    None                                                                                                                                     Keke Hogan CMA       Chart routed to Care Team.

## 2021-09-04 ENCOUNTER — HEALTH MAINTENANCE LETTER (OUTPATIENT)
Age: 61
End: 2021-09-04

## 2021-09-07 NOTE — TELEPHONE ENCOUNTER
Patient Quality Outreach 2nd Attempt      Summary:    Type of outreach:    Sent letter.    Next Steps:  Reach out within 90 days via Phone.    Max number of attempts reached: Yes. Will try again in 90 days if patient still on fail list.    Questions for provider review:    None                                                                                                                    Keke Hogan CMA       Chart routed to Care Team.

## 2021-09-14 ENCOUNTER — MYC REFILL (OUTPATIENT)
Dept: FAMILY MEDICINE | Facility: CLINIC | Age: 61
End: 2021-09-14

## 2021-09-14 DIAGNOSIS — F98.8 ATTENTION DEFICIT DISORDER (ADD) WITHOUT HYPERACTIVITY: ICD-10-CM

## 2021-09-14 DIAGNOSIS — J30.9 ALLERGIC RHINITIS, UNSPECIFIED SEASONALITY, UNSPECIFIED TRIGGER: ICD-10-CM

## 2021-09-16 RX ORDER — DEXTROAMPHETAMINE SACCHARATE, AMPHETAMINE ASPARTATE, DEXTROAMPHETAMINE SULFATE AND AMPHETAMINE SULFATE 5; 5; 5; 5 MG/1; MG/1; MG/1; MG/1
20 TABLET ORAL 2 TIMES DAILY
Qty: 60 TABLET | Refills: 0 | Status: SHIPPED | OUTPATIENT
Start: 2021-09-16 | End: 2021-10-14

## 2021-09-16 RX ORDER — ALBUTEROL SULFATE 90 UG/1
2 AEROSOL, METERED RESPIRATORY (INHALATION) EVERY 6 HOURS PRN
Qty: 8.5 G | Refills: 0 | Status: SHIPPED | OUTPATIENT
Start: 2021-09-16 | End: 2021-10-26

## 2021-09-16 NOTE — TELEPHONE ENCOUNTER
1.  amphetamine-dextroamphetamine (ADDERALL) 20 MG tablet         Last Written Prescription Date:  8/16/2021  Last Fill Quantity: 60,   # refills: 0  Last Office Visit: 3/9/2021  (VIRTUAL VISIT)  Future Office visit:       Routing refill request to provider for review/approval because:  Drug not on the [facility], [facility] or [facility] refill protocol or controlled substance    2.  albuterol (VENTOLIN HFA) 108 (90 Base) MCG/ACT inhaler    Last Written Prescription Date:  7/16/2021  Last Fill Quantity: 1 inhaler,  # refills: 0   Last office visit: 23/9/2021 with prescribing provider:  Sacha Larios PA-C (VIRTUAL VISIT)   Future Office Visit:      Routing refill request to provider for review/approval because:  No ACT on file.    Talisha Rodriguez RN

## 2021-10-02 ENCOUNTER — OFFICE VISIT (OUTPATIENT)
Dept: URGENT CARE | Facility: URGENT CARE | Age: 61
End: 2021-10-02
Payer: COMMERCIAL

## 2021-10-02 VITALS
SYSTOLIC BLOOD PRESSURE: 108 MMHG | DIASTOLIC BLOOD PRESSURE: 78 MMHG | RESPIRATION RATE: 12 BRPM | TEMPERATURE: 97.5 F | OXYGEN SATURATION: 97 % | HEART RATE: 94 BPM

## 2021-10-02 DIAGNOSIS — J01.90 ACUTE SINUSITIS WITH SYMPTOMS > 10 DAYS: Primary | ICD-10-CM

## 2021-10-02 PROCEDURE — 99213 OFFICE O/P EST LOW 20 MIN: CPT | Performed by: STUDENT IN AN ORGANIZED HEALTH CARE EDUCATION/TRAINING PROGRAM

## 2021-10-02 NOTE — PATIENT INSTRUCTIONS
Patient Instructions           1.  Plenty of fluids, rest, warm compresses on face  2.  Mucinex twice daily for at least 4 days  3.  Paula Pot 1x in the morning 1x at night or Saline mist spray.  4.  Try either Claritin (Loratadine), Allegra (Fexofenadine), or Zyrtec (Cetirizine) in the day  5.  Flonase (Fluticasone) 2x each nostril twice a day for two weeks, then once each nostril once a day  6. Antibiotic twice daily for 7 Days  7. Probiotic (ie Culturelle) 1-2 hours after each antibiotic dose      I encourage you to see your primary doctor for reevaluation once you complete the antibiotics to ensure symptoms are improving, fluid behind the ear is clearing.

## 2021-10-02 NOTE — PROGRESS NOTES
Assessment & Plan     Acute sinusitis with symptoms > 10 days  61 year old female with history of recurrent sinus infections (most recent 3 months ago) presenting with congestion, headaches, sinus pressure >10 days. Pain worse with bending forward. She is already performing daily nasal saline rinses. Encouraged use of antihistamine and decongestant with future flares to try to prevent recurrent infection. She is also working on moving out of her current apartment which has seemed to contribute to her allergy symptoms. Recommend follow up with PCP to ensure symptoms (particularly vertigo, effusions) resolve with antibiotic. She does have a history of migraines which may also be contributing. Patient expressed understanding and agreement with plan.   - amoxicillin-clavulanate (AUGMENTIN) 875-125 MG tablet; Take 1 tablet by mouth 2 times daily for 7 days    Return in 2 weeks (on 10/16/2021) for Follow up with your primary doctor for reevaluation.    Emelia Benitez MD  [facility] LEO Rubio is a 61 year old with history of allergic rhinitis and migraines who presents for the following health issues     HPI   Patient presents with:  Sinus Problem: Patient explains- vertigo, congestion, headaches, pain in teeth, eye pain/pressure, nose bleeds, for about a week now. It is really bad!     Symptoms:  Fever: no  Chills/Sweats: YES  Headache (location?): YES- frontal, worse when bending foreward  Sinus Pressure: YES  Conjunctivitis:  Eye watering   Ear Pain: YES- ears feel plugged, painful  Congestion: YES  Cough: no  Wheeze: no  Decreased Appetite: no  Nausea: no  Vomiting: no  Fatigue/Achiness: YES  Sick/Strep Exposure: no  Therapies tried and outcome:   - has tried decongestant   - does daily saline nose spray   - gets nose bleeds with flonase so avoids using    Recurrent sinus infections since moving into new apartment  Worried about allergens as apartment complex has not  changed air filter, air conditioner not working    Seen in  7/19/221 for similar symptoms, prescribed Augmentin for acute sinusitis    History of migraines, has had photophobia but not her usual nausea and vomiting, typically go away with rest/sleep    Review of Systems    ROS: 10 point ROS neg other than the symptoms noted above in the HPI.       Objective    /78 (BP Location: Right arm, Patient Position: Sitting, Cuff Size: Adult Regular)   Pulse 94   Temp 97.5  F (36.4  C) (Tympanic)   Resp 12   SpO2 97%   There is no height or weight on file to calculate BMI.  Physical Exam  Constitutional:       General: She is not in acute distress.     Appearance: She is well-developed. She is not diaphoretic.   HENT:      Right Ear: No drainage or tenderness. A middle ear effusion (clear) is present. Tympanic membrane is not erythematous.      Left Ear: No drainage or tenderness. A middle ear effusion (clear) is present. Tympanic membrane is not erythematous.      Nose: Congestion present.      Mouth/Throat:      Mouth: Mucous membranes are moist.      Pharynx: No oropharyngeal exudate or posterior oropharyngeal erythema.   Eyes:      General: No scleral icterus.     Extraocular Movements: Extraocular movements intact.   Cardiovascular:      Rate and Rhythm: Normal rate.   Pulmonary:      Effort: Pulmonary effort is normal. No respiratory distress.   Skin:     Findings: No rash.   Neurological:      Mental Status: She is alert and oriented to person, place, and time.        No results found for this or any previous visit (from the past 24 hour(s)).

## 2021-10-03 ENCOUNTER — NURSE TRIAGE (OUTPATIENT)
Dept: NURSING | Facility: CLINIC | Age: 61
End: 2021-10-03

## 2021-10-03 NOTE — TELEPHONE ENCOUNTER
Joanne was in yesterday to [facility] and was prescribed a prescription and today Joanne is requesting prescription be sent to [facility].  FNA advised to contact Pharmacy and request it be transferred.    COVID 19 Nurse Triage Plan/Patient Instructions    Please be aware that novel coronavirus (COVID-19) may be circulating in the community. If you develop symptoms such as fever, cough, or SOB or if you have concerns about the presence of another infection including coronavirus (COVID-19), please contact your health care provider or visit [email or web address].     Disposition/Instructions    Home care recommended. Follow home care protocol based instructions.    Thank you for taking steps to prevent the spread of this virus.  o Limit your contact with others.  o Wear a simple mask to cover your cough.  o Wash your hands well and often.    Resources    [facility]: About COVID-19: [email or web address]    CDC: What to Do If You're Sick: www.cdc.gov/coronavirus/2019-ncov/about/steps-when-sick.html    CDC: Ending Home Isolation: www.cdc.gov/coronavirus/2019-ncov/hcp/disposition-in-home-patients.html     CDC: Caring for Someone: www.cdc.gov/coronavirus/2019-ncov/if-you-are-sick/care-for-someone.html     [facility]: Interim Guidance for Hospital Discharge to Home: www.health.[facility].mn.us/diseases/coronavirus/hcp/hospdischarge.pdf    [facility] clinical trials (COVID-19 research studies): clinicalaffairs.[email or web address]/umn-clinical-trials     Below are the COVID-19 hotlines at the [place] Department of Health ([facility]). Interpreters are available.   o For health questions: Call 524-125-5994 or 1-551.847.5883 (7 a.m. to 7 p.m.)  o For questions about schools and childcare: Call 495-096-8323 or 1-573.473.2746 (7 a.m. to 7 p.m.)

## 2021-10-05 ENCOUNTER — TELEPHONE (OUTPATIENT)
Dept: FAMILY MEDICINE | Facility: CLINIC | Age: 61
End: 2021-10-05

## 2021-10-05 NOTE — TELEPHONE ENCOUNTER
Not sure without seeing her. Recommend completing the course of tx as prescribed   Xolair Counseling:  Patient informed of potential adverse effects including but not limited to fever, muscle aches, rash and allergic reactions.  The patient verbalized understanding of the proper use and possible adverse effects of Xolair.  All of the patient's questions and concerns were addressed.

## 2021-10-05 NOTE — TELEPHONE ENCOUNTER
Pt calls.     She was seen at  over the weekend.  She was told  there was fluid in her ears.  This morning, she felt a popping in her ear.  She is wondering if this is normal.   Her ear is just kind of sore.  She is on augmentin.      Will forward to Sacha Larios for advisal.

## 2021-10-18 ENCOUNTER — OFFICE VISIT (OUTPATIENT)
Dept: PEDIATRICS | Facility: CLINIC | Age: 61
End: 2021-10-18
Payer: COMMERCIAL

## 2021-10-18 VITALS
OXYGEN SATURATION: 100 % | TEMPERATURE: 97.8 F | WEIGHT: 203.8 LBS | SYSTOLIC BLOOD PRESSURE: 130 MMHG | BODY MASS INDEX: 31.45 KG/M2 | RESPIRATION RATE: 14 BRPM | HEART RATE: 101 BPM | DIASTOLIC BLOOD PRESSURE: 84 MMHG

## 2021-10-18 DIAGNOSIS — J01.11 ACUTE RECURRENT FRONTAL SINUSITIS: Primary | ICD-10-CM

## 2021-10-18 DIAGNOSIS — J45.21 MILD INTERMITTENT ASTHMA WITH EXACERBATION: ICD-10-CM

## 2021-10-18 PROCEDURE — 99214 OFFICE O/P EST MOD 30 MIN: CPT | Performed by: PHYSICIAN ASSISTANT

## 2021-10-18 RX ORDER — DOXYCYCLINE HYCLATE 100 MG
100 TABLET ORAL 2 TIMES DAILY
Qty: 20 TABLET | Refills: 0 | Status: SHIPPED | OUTPATIENT
Start: 2021-10-18 | End: 2021-12-10

## 2021-10-18 RX ORDER — PREDNISONE 20 MG/1
60 TABLET ORAL DAILY
Qty: 15 TABLET | Refills: 0 | Status: SHIPPED | OUTPATIENT
Start: 2021-10-18 | End: 2021-10-23

## 2021-10-18 NOTE — PROGRESS NOTES
Assessment & Plan     Acute recurrent frontal sinusitis  Begin antibiotics as directed.  - doxycycline hyclate (VIBRA-TABS) 100 MG tablet; Take 1 tablet (100 mg) by mouth 2 times daily    Mild intermittent asthma with exacerbation  Begin oral pred burst. Begin albuterol four times daily.   - predniSONE (DELTASONE) 20 MG tablet; Take 3 tablets (60 mg) by mouth daily for 5 days    AZAEL Vickers [facility] LEO Rubio is a 61 year old who presents for the following health issues:  HPI     Acute Illness  Acute illness concerns: sinus   Onset/Duration: getting worse   Symptoms:  Fever: no  Chills/Sweats: YES  Headache (location?): YES  Sinus Pressure: YES  Conjunctivitis:  YES- eye burning   Ear Pain: YES: bilateral- full feeling   Rhinorrhea: no  Congestion: YES  Sore Throat: YES  Cough: YES-productive of clear sputum, productive of yellow sputum  Wheeze: YES  Decreased Appetite: YES  Nausea: YES  Vomiting: no  Diarrhea: no  Dysuria/Freq.: YES  Dysuria or Hematuria: no  Fatigue/Achiness: YES  Sick/Strep Exposure: No  Therapies tried and outcome: using saline for nasal congestion, increasing water intake; was on augmentin for 5 days, helped but symptoms returned. Asthma is flaring up. Sleep deprivation.     Seen on 10/2 and given augmentin x 7 days. No relief.   Asthma worse. Wheezing. Coughing. No sleep at night.    Review of Systems   Constitutional, HEENT, cardiovascular, pulmonary, gi and gu systems are negative, except as otherwise noted.      Objective    /84 (BP Location: Right arm, Patient Position: Sitting, Cuff Size: Adult Large)   Pulse 101   Temp 97.8  F (36.6  C) (Temporal)   Resp 14   Wt 92.4 kg (203 lb 12.8 oz)   SpO2 100%   BMI 31.45 kg/m    Body mass index is 31.45 kg/m .  Physical Exam   GENERAL: alert and no distress  EYES: Eyes grossly normal to inspection, PERRL and conjunctivae and sclerae normal  HENT: ear canals and TM's normal,  nose and mouth without ulcers or lesions; sinus pressure--max/frontal sinuses  NECK: no adenopathy  RESP: diminished breath sounds throughout; wheezing and rhonchi throughout  CV: regular rate and rhythm, normal S1 S2, no S3 or S4

## 2021-10-18 NOTE — PATIENT INSTRUCTIONS
1. Begin prednisone daily x 5 days. Take with food in the AM   2. vaseline at nasal entry ways  3. Begin doxycycline   4. Push fluids

## 2021-10-18 NOTE — LETTER
October 18, 2021      Joanne Padilla  [address]      [facility] [address]        To Whom It May Concern:    Joanne Padilla  was seen on 10/18/21 with exacerbation of asthma, bronchitis and sinusitis. She is quite ill. Please allow her to reschedule her court date this week due to illness.         Thanks for your consideration,         Jayesh Feldman PA-C

## 2021-10-22 ENCOUNTER — TELEPHONE (OUTPATIENT)
Dept: FAMILY MEDICINE | Facility: CLINIC | Age: 61
End: 2021-10-22

## 2021-10-22 NOTE — TELEPHONE ENCOUNTER
Reason for Call:  Other appointment    Detailed comments: Patient would like to be seen sooner with Fermín Larios    Phone Number Patient can be reached at: Cell number on file:    Telephone Information:   Mobile [phone]       Best Time: anytime    Can we leave a detailed message on this number? YES    Call taken on 10/22/2021 at 11:24 AM by Ritika Bess

## 2021-10-26 ENCOUNTER — OFFICE VISIT (OUTPATIENT)
Dept: FAMILY MEDICINE | Facility: CLINIC | Age: 61
End: 2021-10-26
Payer: COMMERCIAL

## 2021-10-26 VITALS
RESPIRATION RATE: 16 BRPM | OXYGEN SATURATION: 98 % | HEART RATE: 99 BPM | HEIGHT: 68 IN | WEIGHT: 202.6 LBS | SYSTOLIC BLOOD PRESSURE: 108 MMHG | BODY MASS INDEX: 30.71 KG/M2 | DIASTOLIC BLOOD PRESSURE: 80 MMHG | TEMPERATURE: 98.6 F

## 2021-10-26 DIAGNOSIS — R53.83 FATIGUE, UNSPECIFIED TYPE: ICD-10-CM

## 2021-10-26 DIAGNOSIS — Z11.4 SCREENING FOR HIV (HUMAN IMMUNODEFICIENCY VIRUS): ICD-10-CM

## 2021-10-26 DIAGNOSIS — Z11.59 NEED FOR HEPATITIS C SCREENING TEST: ICD-10-CM

## 2021-10-26 DIAGNOSIS — Z12.11 SCREEN FOR COLON CANCER: ICD-10-CM

## 2021-10-26 DIAGNOSIS — F98.8 ATTENTION DEFICIT DISORDER (ADD) WITHOUT HYPERACTIVITY: ICD-10-CM

## 2021-10-26 DIAGNOSIS — E66.811 CLASS 1 OBESITY IN ADULT, UNSPECIFIED BMI, UNSPECIFIED OBESITY TYPE, UNSPECIFIED WHETHER SERIOUS COMORBIDITY PRESENT: ICD-10-CM

## 2021-10-26 DIAGNOSIS — Z13.220 SCREENING FOR HYPERLIPIDEMIA: ICD-10-CM

## 2021-10-26 DIAGNOSIS — Z12.31 ENCOUNTER FOR SCREENING MAMMOGRAM FOR BREAST CANCER: ICD-10-CM

## 2021-10-26 DIAGNOSIS — J30.9 ALLERGIC RHINITIS, UNSPECIFIED SEASONALITY, UNSPECIFIED TRIGGER: Primary | ICD-10-CM

## 2021-10-26 LAB
BASOPHILS # BLD AUTO: 0 10E3/UL (ref 0–0.2)
BASOPHILS NFR BLD AUTO: 1 %
EOSINOPHIL # BLD AUTO: 0.2 10E3/UL (ref 0–0.7)
EOSINOPHIL NFR BLD AUTO: 3 %
ERYTHROCYTE [DISTWIDTH] IN BLOOD BY AUTOMATED COUNT: 14 % (ref 10–15)
HCT VFR BLD AUTO: 46.1 % (ref 35–47)
HGB BLD-MCNC: 15.2 G/DL (ref 11.7–15.7)
LYMPHOCYTES # BLD AUTO: 2.2 10E3/UL (ref 0.8–5.3)
LYMPHOCYTES NFR BLD AUTO: 43 %
MCH RBC QN AUTO: 31 PG (ref 26.5–33)
MCHC RBC AUTO-ENTMCNC: 33 G/DL (ref 31.5–36.5)
MCV RBC AUTO: 94 FL (ref 78–100)
MONOCYTES # BLD AUTO: 0.5 10E3/UL (ref 0–1.3)
MONOCYTES NFR BLD AUTO: 9 %
NEUTROPHILS # BLD AUTO: 2.3 10E3/UL (ref 1.6–8.3)
NEUTROPHILS NFR BLD AUTO: 44 %
PLATELET # BLD AUTO: 265 10E3/UL (ref 150–450)
RBC # BLD AUTO: 4.91 10E6/UL (ref 3.8–5.2)
WBC # BLD AUTO: 5.2 10E3/UL (ref 4–11)

## 2021-10-26 PROCEDURE — 80050 GENERAL HEALTH PANEL: CPT | Performed by: PHYSICIAN ASSISTANT

## 2021-10-26 PROCEDURE — 87389 HIV-1 AG W/HIV-1&-2 AB AG IA: CPT | Performed by: PHYSICIAN ASSISTANT

## 2021-10-26 PROCEDURE — 86803 HEPATITIS C AB TEST: CPT | Performed by: PHYSICIAN ASSISTANT

## 2021-10-26 PROCEDURE — 90682 RIV4 VACC RECOMBINANT DNA IM: CPT | Performed by: PHYSICIAN ASSISTANT

## 2021-10-26 PROCEDURE — 90471 IMMUNIZATION ADMIN: CPT | Performed by: PHYSICIAN ASSISTANT

## 2021-10-26 PROCEDURE — 99214 OFFICE O/P EST MOD 30 MIN: CPT | Mod: 25 | Performed by: PHYSICIAN ASSISTANT

## 2021-10-26 PROCEDURE — 80061 LIPID PANEL: CPT | Performed by: PHYSICIAN ASSISTANT

## 2021-10-26 PROCEDURE — 36415 COLL VENOUS BLD VENIPUNCTURE: CPT | Performed by: PHYSICIAN ASSISTANT

## 2021-10-26 RX ORDER — DEXTROAMPHETAMINE SACCHARATE, AMPHETAMINE ASPARTATE, DEXTROAMPHETAMINE SULFATE AND AMPHETAMINE SULFATE 5; 5; 5; 5 MG/1; MG/1; MG/1; MG/1
20 TABLET ORAL 2 TIMES DAILY
Qty: 60 TABLET | Refills: 0 | Status: CANCELLED | OUTPATIENT
Start: 2021-10-26

## 2021-10-26 RX ORDER — ALBUTEROL SULFATE 90 UG/1
2 AEROSOL, METERED RESPIRATORY (INHALATION) EVERY 6 HOURS PRN
Qty: 8.5 G | Refills: 0 | Status: SHIPPED | OUTPATIENT
Start: 2021-10-26 | End: 2021-12-09

## 2021-10-26 RX ORDER — MONTELUKAST SODIUM 10 MG/1
10 TABLET ORAL AT BEDTIME
Qty: 90 TABLET | Refills: 0 | Status: SHIPPED | OUTPATIENT
Start: 2021-10-26 | End: 2021-12-10

## 2021-10-26 ASSESSMENT — MIFFLIN-ST. JEOR: SCORE: 1534.55

## 2021-10-26 ASSESSMENT — PAIN SCALES - GENERAL: PAINLEVEL: NO PAIN (0)

## 2021-10-26 NOTE — PROGRESS NOTES
Assessment & Plan     Screen for colon cancer  due  - Adult Gastro Ref - Procedure Only; Future    Screening for HIV (human immunodeficiency virus)  Per CDC  - HIV Antigen Antibody Combo; Future  - HIV Antigen Antibody Combo    Need for hepatitis C screening test  Per CDC  - Hepatitis C Screen Reflex to HCV RNA Quant and Genotype; Future  - Hepatitis C Screen Reflex to HCV RNA Quant and Genotype    Screening for hyperlipidemia  Per request and is due  - Lipid panel reflex to direct LDL Fasting; Future  - Lipid panel reflex to direct LDL Fasting    Allergic rhinitis, unspecified seasonality, unspecified trigger  Ongoing symptoms for many years. She is determined that his is solely due to her current living environment. She is using allergy medications only off/on. She never followed back up with allergist, as weve discussed many times to consider odactra. We discussed consideration to treat for smoldering rhinosinusitis but she prefers to hold off for now. Rosemary strongly recommend she initiate her OTC treatments again and will start below as well. R/b/se discussed.   - albuterol (VENTOLIN HFA) 108 (90 Base) MCG/ACT inhaler; Inhale 2 puffs into the lungs every 6 hours as needed for shortness of breath / dyspnea or wheezing  - montelukast (SINGULAIR) 10 MG tablet; Take 1 tablet (10 mg) by mouth At Bedtime    Attention deficit disorder (ADD) without hyperactivity  She mentions she benefits from the medication and tolerates. I still propose that if she would focus on obtaining sleep she would need this less or at the least have improved cognition/attention. Refilling as needed     Class 1 obesity in adult, unspecified BMI, unspecified obesity type, unspecified whether serious comorbidity present  - Comprehensive metabolic panel (BMP + Alb, Alk Phos, ALT, AST, Total. Bili, TP); Future  - Comprehensive metabolic panel (BMP + Alb, Alk Phos, ALT, AST, Total. Bili, TP)    Fatigue, unspecified type  This is multifocal. The  "allergies are affecting her sleep but also stress and additionally the fact she does not focus on getting sleep. She does not believe she snores but testing for quality of sleep with a sleep study is worth consideration down the road. Rosemary asked her to establish a true sleep pattern and stick to it over the next three months. SCreen below  - Comprehensive metabolic panel (BMP + Alb, Alk Phos, ALT, AST, Total. Bili, TP); Future  - TSH with free T4 reflex; Future  - CBC with platelets and differential; Future  - Comprehensive metabolic panel (BMP + Alb, Alk Phos, ALT, AST, Total. Bili, TP)  - TSH with free T4 reflex  - CBC with platelets and differential    Encounter for screening mammogram for breast cancer  - *MA Screening Digital Bilateral; Future             BMI:   Estimated body mass index is 31.26 kg/m  as calculated from the following:    Height as of this encounter: 1.715 m (5' 7.5\").    Weight as of this encounter: 91.9 kg (202 lb 9.6 oz).           Return in about 3 months (around 1/26/2022) for Physical Exam, Lab Work.    AZAEL Thomas [facility] SHALONDA Rubio is a 61 year old who presents for the following health issues     HPI   General Follow Up  Sinus issues  Concern: on day 7 of second round of antibiotics and still not feeling better, patient still having dizziness   Problem started: few months ago  Progression of symptoms: worse  She mentions she thinks the symptoms are still from air quality from her current residence  Causing problems with sleeping; woke up coughing, feeling like she couldn't breathe because of acid taste in her mouth; felt like it was on fire; \"disgusting brown stuff drains down the throat\"  Has been seen a few times since then; started on abx  She has stopped flonase a while ago; getting some nosebleeds  Breathing is ok other than bedtime; some wheezing; smoking reduced  Never followed back with allergist/ENT      Medication " "Followup of Adderall    Taking Medication as prescribed: yes    Side Effects:  None    Medication Helping Symptoms:  yes   She mentions that overall her symptoms are controlled but her sleep is still very poor  Dealing a lot with her current building/residence and the stresses  Thinks she would sleep around an hour nightly      Patient would like to discuss getting lab work done, Patient is fasting; wants to get today as opposed to waiting for annual check up later    Review of Systems   Constitutional, HEENT, cardiovascular, pulmonary, gi and gu systems are negative, except as otherwise noted.      Objective    /80 (BP Location: Right arm, Patient Position: Chair, Cuff Size: Adult Regular)   Pulse 99   Temp 98.6  F (37  C) (Oral)   Resp 16   Ht 1.715 m (5' 7.5\")   Wt 91.9 kg (202 lb 9.6 oz)   SpO2 98%   BMI 31.26 kg/m    Body mass index is 31.26 kg/m .  Physical Exam   GENERAL: healthy, alert and no distress  EYES: Eyes grossly normal to inspection  HENT: normal cephalic/atraumatic, both ears: clear effusion, nasal mucosa edematous , oropharynx raw  NECK: no adenopathy and thyroid normal to palpation  RESP: grossly clear, mild expiratory wheeze in the right  CV: regular rates and rhythm  PSYCH: mentation appears normal, anxious and fatigued    Tests: see a/p            "

## 2021-10-27 LAB
ALBUMIN SERPL-MCNC: 3.2 G/DL (ref 3.4–5)
ALP SERPL-CCNC: 73 U/L (ref 40–150)
ALT SERPL W P-5'-P-CCNC: 21 U/L (ref 0–50)
ANION GAP SERPL CALCULATED.3IONS-SCNC: 9 MMOL/L (ref 3–14)
AST SERPL W P-5'-P-CCNC: 12 U/L (ref 0–45)
BILIRUB SERPL-MCNC: 0.4 MG/DL (ref 0.2–1.3)
BUN SERPL-MCNC: 17 MG/DL (ref 7–30)
CALCIUM SERPL-MCNC: 8.9 MG/DL (ref 8.5–10.1)
CHLORIDE BLD-SCNC: 107 MMOL/L (ref 94–109)
CHOLEST SERPL-MCNC: 238 MG/DL
CO2 SERPL-SCNC: 19 MMOL/L (ref 20–32)
CREAT SERPL-MCNC: 0.74 MG/DL (ref 0.52–1.04)
FASTING STATUS PATIENT QL REPORTED: YES
GFR SERPL CREATININE-BSD FRML MDRD: 89 ML/MIN/1.73M2
GLUCOSE BLD-MCNC: 94 MG/DL (ref 70–99)
HCV AB SERPL QL IA: NONREACTIVE
HDLC SERPL-MCNC: 58 MG/DL
HIV 1+2 AB+HIV1 P24 AG SERPL QL IA: NONREACTIVE
LDLC SERPL CALC-MCNC: 157 MG/DL
NONHDLC SERPL-MCNC: 180 MG/DL
POTASSIUM BLD-SCNC: 4.2 MMOL/L (ref 3.4–5.3)
PROT SERPL-MCNC: 6.8 G/DL (ref 6.8–8.8)
SODIUM SERPL-SCNC: 135 MMOL/L (ref 133–144)
TRIGL SERPL-MCNC: 117 MG/DL
TSH SERPL DL<=0.005 MIU/L-ACNC: 1.71 MU/L (ref 0.4–4)

## 2021-10-27 ASSESSMENT — ASTHMA QUESTIONNAIRES: ACT_TOTALSCORE: 5

## 2021-11-14 ENCOUNTER — MYC REFILL (OUTPATIENT)
Dept: FAMILY MEDICINE | Facility: CLINIC | Age: 61
End: 2021-11-14
Payer: COMMERCIAL

## 2021-11-14 DIAGNOSIS — F98.8 ATTENTION DEFICIT DISORDER (ADD) WITHOUT HYPERACTIVITY: ICD-10-CM

## 2021-11-15 DIAGNOSIS — F98.8 ATTENTION DEFICIT DISORDER (ADD) WITHOUT HYPERACTIVITY: ICD-10-CM

## 2021-11-15 RX ORDER — DEXTROAMPHETAMINE SACCHARATE, AMPHETAMINE ASPARTATE, DEXTROAMPHETAMINE SULFATE AND AMPHETAMINE SULFATE 5; 5; 5; 5 MG/1; MG/1; MG/1; MG/1
20 TABLET ORAL 2 TIMES DAILY
Qty: 60 TABLET | Refills: 0 | Status: SHIPPED | OUTPATIENT
Start: 2021-11-15 | End: 2021-12-09

## 2021-11-16 RX ORDER — DEXTROAMPHETAMINE SACCHARATE, AMPHETAMINE ASPARTATE, DEXTROAMPHETAMINE SULFATE AND AMPHETAMINE SULFATE 5; 5; 5; 5 MG/1; MG/1; MG/1; MG/1
20 TABLET ORAL 2 TIMES DAILY
Qty: 60 TABLET | Refills: 0 | OUTPATIENT
Start: 2021-11-16

## 2021-12-09 ENCOUNTER — MYC REFILL (OUTPATIENT)
Dept: FAMILY MEDICINE | Facility: CLINIC | Age: 61
End: 2021-12-09
Payer: COMMERCIAL

## 2021-12-09 DIAGNOSIS — J30.9 ALLERGIC RHINITIS, UNSPECIFIED SEASONALITY, UNSPECIFIED TRIGGER: ICD-10-CM

## 2021-12-09 DIAGNOSIS — F98.8 ATTENTION DEFICIT DISORDER (ADD) WITHOUT HYPERACTIVITY: ICD-10-CM

## 2021-12-09 ASSESSMENT — ENCOUNTER SYMPTOMS
PALPITATIONS: 0
HEADACHES: 1
HEMATOCHEZIA: 0
NAUSEA: 1
FREQUENCY: 1
DYSURIA: 0
ARTHRALGIAS: 1
CONSTIPATION: 0
HEARTBURN: 0
SHORTNESS OF BREATH: 0
WEAKNESS: 1
EYE PAIN: 0
DIARRHEA: 0
PARESTHESIAS: 0
BREAST MASS: 0
JOINT SWELLING: 0
NERVOUS/ANXIOUS: 0
MYALGIAS: 0
HEMATURIA: 0
ABDOMINAL PAIN: 1
DIZZINESS: 1
FEVER: 0
CHILLS: 1
SORE THROAT: 0
COUGH: 1

## 2021-12-09 ASSESSMENT — PATIENT HEALTH QUESTIONNAIRE - PHQ9
SUM OF ALL RESPONSES TO PHQ QUESTIONS 1-9: 20
10. IF YOU CHECKED OFF ANY PROBLEMS, HOW DIFFICULT HAVE THESE PROBLEMS MADE IT FOR YOU TO DO YOUR WORK, TAKE CARE OF THINGS AT HOME, OR GET ALONG WITH OTHER PEOPLE: EXTREMELY DIFFICULT
SUM OF ALL RESPONSES TO PHQ QUESTIONS 1-9: 20

## 2021-12-10 ENCOUNTER — OFFICE VISIT (OUTPATIENT)
Dept: FAMILY MEDICINE | Facility: CLINIC | Age: 61
End: 2021-12-10
Payer: COMMERCIAL

## 2021-12-10 ENCOUNTER — ANCILLARY PROCEDURE (OUTPATIENT)
Dept: MAMMOGRAPHY | Facility: CLINIC | Age: 61
End: 2021-12-10
Payer: COMMERCIAL

## 2021-12-10 VITALS
WEIGHT: 201.5 LBS | HEIGHT: 68 IN | HEART RATE: 108 BPM | SYSTOLIC BLOOD PRESSURE: 110 MMHG | RESPIRATION RATE: 18 BRPM | DIASTOLIC BLOOD PRESSURE: 74 MMHG | TEMPERATURE: 98.6 F | BODY MASS INDEX: 30.54 KG/M2 | OXYGEN SATURATION: 98 %

## 2021-12-10 DIAGNOSIS — Z00.00 ROUTINE GENERAL MEDICAL EXAMINATION AT A HEALTH CARE FACILITY: Primary | ICD-10-CM

## 2021-12-10 DIAGNOSIS — Z12.4 SCREENING FOR CERVICAL CANCER: ICD-10-CM

## 2021-12-10 DIAGNOSIS — Z12.31 ENCOUNTER FOR SCREENING MAMMOGRAM FOR BREAST CANCER: ICD-10-CM

## 2021-12-10 DIAGNOSIS — J31.0 CHRONIC RHINITIS: ICD-10-CM

## 2021-12-10 DIAGNOSIS — Z72.0 TOBACCO ABUSE: ICD-10-CM

## 2021-12-10 DIAGNOSIS — Z12.31 VISIT FOR SCREENING MAMMOGRAM: ICD-10-CM

## 2021-12-10 DIAGNOSIS — Z12.11 SCREENING FOR COLON CANCER: ICD-10-CM

## 2021-12-10 PROCEDURE — 77063 BREAST TOMOSYNTHESIS BI: CPT | Mod: TC | Performed by: RADIOLOGY

## 2021-12-10 PROCEDURE — G0145 SCR C/V CYTO,THINLAYER,RESCR: HCPCS | Performed by: PHYSICIAN ASSISTANT

## 2021-12-10 PROCEDURE — 77067 SCR MAMMO BI INCL CAD: CPT | Mod: TC | Performed by: RADIOLOGY

## 2021-12-10 PROCEDURE — 90471 IMMUNIZATION ADMIN: CPT | Performed by: PHYSICIAN ASSISTANT

## 2021-12-10 PROCEDURE — 90732 PPSV23 VACC 2 YRS+ SUBQ/IM: CPT | Performed by: PHYSICIAN ASSISTANT

## 2021-12-10 PROCEDURE — 99396 PREV VISIT EST AGE 40-64: CPT | Mod: 25 | Performed by: PHYSICIAN ASSISTANT

## 2021-12-10 PROCEDURE — 87624 HPV HI-RISK TYP POOLED RSLT: CPT | Performed by: PHYSICIAN ASSISTANT

## 2021-12-10 ASSESSMENT — ENCOUNTER SYMPTOMS
ABDOMINAL PAIN: 1
DIZZINESS: 1
NERVOUS/ANXIOUS: 0
MYALGIAS: 0
CONSTIPATION: 0
BREAST MASS: 0
COUGH: 1
CHILLS: 1
EYE PAIN: 0
FREQUENCY: 1
SHORTNESS OF BREATH: 0
HEMATURIA: 0
DIARRHEA: 0
NAUSEA: 1
HEMATOCHEZIA: 0
WEAKNESS: 1
FEVER: 0
PARESTHESIAS: 0
SORE THROAT: 0
PALPITATIONS: 0
ARTHRALGIAS: 1
HEARTBURN: 0
JOINT SWELLING: 0
HEADACHES: 1
DYSURIA: 0

## 2021-12-10 ASSESSMENT — MIFFLIN-ST. JEOR: SCORE: 1529.56

## 2021-12-10 ASSESSMENT — PAIN SCALES - GENERAL: PAINLEVEL: NO PAIN (0)

## 2021-12-10 ASSESSMENT — PATIENT HEALTH QUESTIONNAIRE - PHQ9: SUM OF ALL RESPONSES TO PHQ QUESTIONS 1-9: 20
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
## 2021-12-12 PROCEDURE — 82274 ASSAY TEST FOR BLOOD FECAL: CPT | Performed by: PHYSICIAN ASSISTANT

## 2021-12-13 LAB — HEMOCCULT STL QL IA: NEGATIVE

## 2021-12-13 RX ORDER — ALBUTEROL SULFATE 90 UG/1
2 AEROSOL, METERED RESPIRATORY (INHALATION) EVERY 6 HOURS PRN
Qty: 8.5 G | Refills: 1 | Status: SHIPPED | OUTPATIENT
Start: 2021-12-13 | End: 2022-06-15

## 2021-12-13 RX ORDER — DEXTROAMPHETAMINE SACCHARATE, AMPHETAMINE ASPARTATE, DEXTROAMPHETAMINE SULFATE AND AMPHETAMINE SULFATE 5; 5; 5; 5 MG/1; MG/1; MG/1; MG/1
20 TABLET ORAL 2 TIMES DAILY
Qty: 60 TABLET | Refills: 0 | Status: SHIPPED | OUTPATIENT
Start: 2021-12-13 | End: 2022-01-11

## 2021-12-13 NOTE — TELEPHONE ENCOUNTER
adderall  LRF 11/15/21, disp 60 with no refills  LOV 12/10/21    Routing refill request to provider for review/approval because:  Failed protocol for albuterol due to ACT below parameters and adderall is not on the protocol.

## 2021-12-14 LAB
BKR LAB AP GYN ADEQUACY: NORMAL
BKR LAB AP GYN INTERPRETATION: NORMAL
BKR LAB AP HPV REFLEX: NORMAL
BKR LAB AP PREVIOUS ABNORMAL: NORMAL
PATH REPORT.COMMENTS IMP SPEC: NORMAL
PATH REPORT.COMMENTS IMP SPEC: NORMAL
PATH REPORT.RELEVANT HX SPEC: NORMAL

## 2021-12-15 LAB
HUMAN PAPILLOMA VIRUS 16 DNA: NEGATIVE
HUMAN PAPILLOMA VIRUS 18 DNA: NEGATIVE
HUMAN PAPILLOMA VIRUS FINAL DIAGNOSIS: NORMAL
HUMAN PAPILLOMA VIRUS OTHER HR: NEGATIVE

## 2021-12-28 ENCOUNTER — ANCILLARY PROCEDURE (OUTPATIENT)
Dept: GENERAL RADIOLOGY | Facility: CLINIC | Age: 61
End: 2021-12-28
Attending: PHYSICIAN ASSISTANT
Payer: COMMERCIAL

## 2021-12-28 ENCOUNTER — OFFICE VISIT (OUTPATIENT)
Dept: URGENT CARE | Facility: URGENT CARE | Age: 61
End: 2021-12-28
Payer: COMMERCIAL

## 2021-12-28 VITALS
DIASTOLIC BLOOD PRESSURE: 74 MMHG | RESPIRATION RATE: 18 BRPM | OXYGEN SATURATION: 96 % | HEART RATE: 95 BPM | SYSTOLIC BLOOD PRESSURE: 126 MMHG | TEMPERATURE: 98 F
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
## 2021-12-28 DIAGNOSIS — M79.642 PAIN OF LEFT HAND: ICD-10-CM

## 2021-12-28 DIAGNOSIS — M25.561 ACUTE PAIN OF RIGHT KNEE: Primary | ICD-10-CM

## 2021-12-28 DIAGNOSIS — M25.561 ACUTE PAIN OF RIGHT KNEE: ICD-10-CM

## 2021-12-28 PROCEDURE — 73130 X-RAY EXAM OF HAND: CPT | Mod: LT | Performed by: RADIOLOGY

## 2021-12-28 PROCEDURE — 99213 OFFICE O/P EST LOW 20 MIN: CPT | Performed by: PHYSICIAN ASSISTANT

## 2021-12-28 PROCEDURE — 73562 X-RAY EXAM OF KNEE 3: CPT | Mod: RT | Performed by: RADIOLOGY

## 2021-12-29 NOTE — PROGRESS NOTES
ASSESSMENT/PLAN:    (M25.561) Acute pain of right knee  (primary encounter diagnosis)  MDM: Please see below for layperson MDM and follow-up plan.   Plan: XR Knee Right 3 Views, Crutches Order for DME -        ONLY FOR DME, Orthopedic  Referral    December 28, 2021 [facility]:     I do not see any obvious broken bones in your hand or knee today on exam.     I suspect your left hand pain is caused by a bone bruise (contusion).     It is early (only hours after your fall) to clearly diagnose the cause of your right knee pain. It is possible you could have bruised your kneecap. It is also possible you could have a tear in your knee cartilage or a tear in one of your knee ligaments.    Because you state you are unable to bear weight due to pain, I have provided crutches for you. You should use crutches until you are able to bear weight without pain.     You should not drive until you are able to use your right leg without pain.     I advise you ice and elevate your hand and knee for the next several days. If you are able to tolerate Ibuprofen and Tylenol, you may use these medications for pain (do not take more than is recommended on the back of the over-the-counter bottles).     If your symptoms fail to improve, or if they worsen, I advise you follow-up with an orthopedic specialist. I have provided a referral for you today.     If you are unable to get an appointment with an orthopedic specialist in your desired timeframe, you could go to an orthopedic urgent care (such as [facility])     (W00.9XXA) Fall due to slipping on ice or snow, initial encounter  Plan: XR Knee Right 3 Views, XR Hand Left G/E 3 Views      (M79.642) Pain of left hand  Plan: XR Hand Left G/E 3 Views, Orthopedic          Referral  ----------------------------------------------------------      Joanne Padilla is a 61 year old female who presents to  today for evaluation of acute onset right knee  pain and left hand pain that she relates to slip/fall on ice outside her apartment building--several hours prior to presentation here today.     Patient states she fell onto bent knee and is uncertain of mechanism involved with left hand     RIGHT KNEE: points to entire murali-patellar area as site of pain. Patient reports pain sitting is 1-2/10 and states pain is 10/10 with any attempt to bear weight/states she is unable to walk.     No acute locking, catching, snapping, popping, or giving way. Denies any referred pain.  Denies any lower leg pain, redness, or swelling.     LEFT HAND: Points to lateral hand (4th and 5th MCP area)    No cold, blue, numb or tingling fingers.     No loss of consciousness, altered mental status, acute neck pain or other injuries associated with fall.     Past Medical History:   Diagnosis Date     Allergic rhinitis, cause unspecified      Anxiety state, unspecified      Atopic dermatitis 10/10/2011     Cardiac dysrhythmia, unspecified      Chronic rhinitis 10/10/2011     Colitis      Exhaustion due to excessive exertion(994.5)      Gallstones      Hypercholesterolemia      Insomnia, unspecified      Major depressive disorder, single episode, unspecified      Migraines 12/17/2012     Osteoarthritis of CMC joint of thumb      Other acne      Other malaise and fatigue      Palpitations      PONV (postoperative nausea and vomiting)      Uncomplicated asthma     Occasional     Patient Active Problem List   Diagnosis     Chronic rhinitis     Atopic dermatitis     Migraines     Allergic rhinitis     Exhaustion due to excessive exertion(994.5)     Current Outpatient Medications   Medication     albuterol (VENTOLIN HFA) 108 (90 Base) MCG/ACT inhaler     amphetamine-dextroamphetamine (ADDERALL) 20 MG tablet     cholecalciferol (VITAMIN D-1000 MAX ST) 1000 units TABS     Cyanocobalamin (VITAMIN B12 PO)     fluticasone (FLONASE) 50 MCG/ACT nasal spray     Multiple Vitamins-Minerals (MULTIVITAMIN ADULT  PO)     No current facility-administered medications for this visit.     Allergies   Allergen Reactions     Codeine Nausea     Dust Mites      Latex              OBJECTIVE:  /74   Pulse 95   Temp 98  F (36.7  C) (Tympanic)   Resp 18   LMP  (LMP Unknown)   SpO2 96%   Breastfeeding No     GENERAL:  Very pleasant, comfortable at rest. NAD.   CARDIAC:NORMAL - regular rate and rhythm without murmur., normal s1/s2 and without extra heart sounds  RESP: Normal - CTA without rales, rhonchi, or wheezing.  Right KNEE: Mild murali-patellar swelling. No bruising or lacerations. No obvious deformity. No obvious patellar dislocation. Patient is in wheelchair today, so I am unable to analyze gait.  Positive for generalized murali-patellar pain, she also has medial and lateal joint line pain, but has negative Jesus test  Negative drawer sign, collateral ligaments intact.  Exam of lower leg unremarkable. No redness, tenderness, or swelling.  Both lower legs equal in circumference bilaterally.   LEFT HAND: unremarkable on inspection. No obvious deformity. No redness, bruising or swelling. Good/brisk capillary refill all digits, FROM hand despite pain. Good sensation to all digits.     XR RIGHT KNEE X-RAYS: I reviewed my impression (No patellar dislocation. No acute fracture. No acute findings)with patient during today's office visit.  Radiologist over-read pending. Patient will need to be called if there are any acute findings on radiologist over-read.     LEFT HAND X-RAYS:  I reviewed my impression (No dislocations. No acute fracture. No acute findings)with patient during today's office visit.  Radiologist over-read pending. Patient will need to be called if there are any acute findings on radiologist over-read.

## 2021-12-29 NOTE — PATIENT INSTRUCTIONS
Patient Education     Hand Bruise   You have a bruise (contusion). There is swelling and some bleeding under the skin, but no broken bones. This injury generally takes a few days to a few weeks to heal.  During that time, the bruise will typically change in color from reddish, to purple-blue, to greenish-yellow, then to yellow-brown.   Home care    Elevate the hand to reduce pain and swelling. As much as possible, sit or lie down with the hand raised about the level of your heart. This is especially important during the first 48 hours.    Ice the hand to help reduce pain and swelling. Wrap an ice pack or ice cubes in a plastic bag in a thin towel. Apply to the bruised area for 20 minutes every 1 to 2 hours the first day. Continue this 3 to 4 times a day until the pain and swelling goes away.    Unless another medicine was prescribed, you can take acetaminophen, ibuprofen, or naproxen to control pain. Talk with your healthcare provider before using these medicines if you have chronic liver or kidney disease or ever had a stomach ulcer or digestive bleeding    Follow up  Follow up with your healthcare provider, or as advised. Call if you do not get better within 1 to 2 weeks.   When to seek medical advice   Call your healthcare provider right away if you have any of the following:    Increased pain or swelling    Arm becomes cold, blue, numb or tingly    Signs of infection: Warmth, drainage, or increased redness or pain around the bruise    Inability to move the injured hand, any of the fingers, or the joints of the finger    Frequent bruising for unknown reasons  Conductor last reviewed this educational content on 8/1/2019 2000-2021 [employer]. All rights reserved. This information is not intended as a substitute for professional medical care. Always follow your healthcare professional's instructions.           Patient Education     Knee Pain with Uncertain Cause    There are several common causes  for knee pain. These can include:    A sprain of the ligaments that support the joint    An injury to the cartilage lining of the joint    Arthritis from wear-and-tear or inflammation  There are other causes as well. There may also be swelling, reduced movement of the knee joint, and pain with walking. A definite diagnosis will still need to be made. If your symptoms don't improve, further follow-up and testing may be needed.  Home care    Stay off the injured leg as much as possible until pain improves.    Apply an ice pack over the injured area for 15 to 20 minutes every 3 to 6 hours. You should do this for the first 24 to 48 hours. You can make an ice pack by filling a plastic bag that seals at the top with ice cubes and then wrapping it with a thin towel. Continue to use ice packs for relief of pain and swelling as needed. As the ice melts, be careful not to get your wrap, splint, or cast wet. After 48 hours, apply heat (warm shower or warm bath) for 15 to 20 minutes several times a day, or alternate ice and heat. If you have to wear a hook-and-loop knee brace, you can open it to apply the ice pack, or heat, directly to the knee. Never put ice directly on the skin. Always wrap the ice in a towel or other type of cloth.    You may use over-the-counter pain medicine to control pain, unless another pain medicine was prescribed. If you have chronic liver or kidney disease or ever had a stomach ulcer or gastrointestinal bleeding, talk with your healthcare provider before using these medicines.    If crutches or a walker have been recommended, don't put weight on the injured leg until you can do so without pain. Check with your healthcare provider before returning to sports or full work duties.    If you have a hook-and-loop knee brace, you can remove it to bathe and sleep, unless told otherwise.  Follow-up care  Follow up with your healthcare provider as advised. This is usually within 1 to 2 weeks.  If X-rays were  taken, you will be told of any new findings that may affect your care  Call 911  Call 911 if you have:    Shortness of breath    Chest pain  When to seek medical advice  Call your healthcare provider right away if any of these occur:    Toes or foot becomes swollen, cold, blue, numb, or tingly    Pain or swelling spreads over the knee or calf    Warmth or redness appears over the knee or calf    Other joints become painful    Rash appears    Fever of 100.4 F (38 C) or higher, or as directed by your healthcare provider    Christina Alvarez last reviewed this educational content on 5/1/2018 2000-2021 [employer]. All rights reserved. This information is not intended as a substitute for professional medical care. Always follow your healthcare professional's instructions.               December 28, 2021 [facility]:     I do not see any obvious broken bones in your hand or knee today on exam.     I suspect your left hand pain is caused by a bone bruise (contusion).     It is early (only hours after your fall) to clearly diagnose the cause of your right knee pain. It is possible you could have bruised your kneecap. It is also possible you could have a tear in your knee cartilage or a tear in one of your knee ligaments.    Because you state you are unable to bear weight due to pain, I have provided crutches for you. You should use crutches until you are able to bear weight without pain.     You should not drive until you are able to use your right leg without pain.     I advise you ice and elevate your hand and knee for the next several days. If you are able to tolerate Ibuprofen and Tylenol, you may use these medications for pain (do not take more than is recommended on the back of the over-the-counter bottles).     If your symptoms fail to improve, or if they worsen, I advise you follow-up with an orthopedic specialist. I have provided a referral for you today.     If you are unable to get an  appointment with an orthopedic specialist in your desired timeframe, you could go to an orthopedic urgent care (such as [facility])

## 2021-12-31 ENCOUNTER — OFFICE VISIT (OUTPATIENT)
Dept: ORTHOPEDICS | Facility: CLINIC | Age: 61
End: 2021-12-31
Attending: PHYSICIAN ASSISTANT
Payer: COMMERCIAL

## 2021-12-31 DIAGNOSIS — S89.91XA JOINT INJURY OF RIGHT KNEE, INITIAL ENCOUNTER: Primary | ICD-10-CM

## 2021-12-31 DIAGNOSIS — M79.642 PAIN OF LEFT HAND: ICD-10-CM

## 2021-12-31 DIAGNOSIS — M25.561 ACUTE PAIN OF RIGHT KNEE: ICD-10-CM

## 2021-12-31 PROCEDURE — 99203 OFFICE O/P NEW LOW 30 MIN: CPT | Performed by: PHYSICIAN ASSISTANT

## 2021-12-31 NOTE — PROGRESS NOTES
HISTORY OF PRESENT ILLNESS:    Joanne Padilla is a 61 year old female who is seen in consultation at the request of Dr. Harp for Fall 12/28/2021, with right knee and left hand pain.    Present symptoms:  Symptoms began 3 days ago.  Patient describes injury as a fall outside on glare ice during which he fell on her Leftt hand and right knee at the tibial tubercle.  She reports soreness at the right knee pain that is located diffusely at rest and at the inside of the knee with certain motions; radiation Present with proximal quad pain.  Pt states the left hypothenar pain is mainly resolving and can use crutch without issues. Right knee Pain is 4/10 in maximal severity, and 0/10 currently.  Symptoms are generally worse with/at walking, standing after sitting; better with/at rest.   Overall the patient feels the condition is improving rather than worsening. Other treatment so far has consisted of Ice first couple days, occasional tylenol or ibuprofe, Elevation and currently using knee brace and crutches when out of the home with moderate relief. States she does struggle to use crutches well. Associated symptoms: has not had swelling, locking, or catching; denies buckling episodes or instability sensation.  She denies history of similar or related problems.    Orthopedic PMH: hx CMC arthritis otherwise none known.   Past Medical History:   Diagnosis Date     Allergic rhinitis, cause unspecified      Anxiety state, unspecified      Atopic dermatitis 10/10/2011     Cardiac dysrhythmia, unspecified      Chronic rhinitis 10/10/2011     Colitis      Exhaustion due to excessive exertion(994.5)      Gallstones      Hypercholesterolemia      Insomnia, unspecified      Major depressive disorder, single episode, unspecified      Migraines 12/17/2012     Osteoarthritis of CMC joint of thumb      Other acne      Other malaise and fatigue      Palpitations      PONV (postoperative nausea and vomiting)      Uncomplicated  asthma     Occasional       Past Surgical History:   Procedure Laterality Date     BIOPSY      breast lumps      SECTION       COLONOSCOPY       DAVINCI LAPAROSCOPIC CHOLECYSTECTOMY WITHOUT GRAMS  2013    Procedure: DAVINCI LAPAROSCOPIC CHOLECYSTECTOMY WITHOUT GRAMS;  DAVINCI CHOLECYSTECTOMY;  Surgeon: Jac Stallings MD;  Location: [facility] OR     ENT SURGERY      deviated septum, blocked nasal passage     GYN SURGERY      repair of fallopian tubes and ovaries     LUMPECTOMY BREAST       LUMPECTOMY BREAST       NOSE SURGERY         Family History   Problem Relation Age of Onset     C.A.D. Mother         MI early 50s;  59 heart problems     Breast Cancer Mother         Cancer Unknown Primary     Other Cancer Mother      Anesthesia Reaction Mother      C.A.D. Father      Diabetes Father      Breast Cancer Maternal Grandmother         Cancer Unknown Primary     Anxiety Disorder Sister      Thyroid Disease Paternal Grandmother        Social History     Socioeconomic History     Marital status:      Spouse name: Not on file     Number of children: Not on file     Years of education: Not on file     Highest education level: Not on file   Occupational History     Not on file   Tobacco Use     Smoking status: Former Smoker     Packs/day: 0.50     Years: 15.00     Pack years: 7.50     Types: Cigarettes     Smokeless tobacco: Former User     Quit date: 10/1/2021     Tobacco comment: has her chantix   Substance and Sexual Activity     Alcohol use: No     Drug use: No     Sexual activity: Not Currently     Partners: Male     Birth control/protection: Pill, None   Other Topics Concern     Parent/sibling w/ CABG, MI or angioplasty before 65F 55M? No   Social History Narrative     Not on file     Social Determinants of Health     Financial Resource Strain: Not on file   Food Insecurity: Not on file   Transportation Needs: Not on file   Physical Activity: Not on file   Stress: Not on file   Social  Connections: Not on file   Intimate Partner Violence: Not on file   Housing Stability: Not on file       Current Outpatient Medications   Medication Sig Dispense Refill     albuterol (VENTOLIN HFA) 108 (90 Base) MCG/ACT inhaler Inhale 2 puffs into the lungs every 6 hours as needed for shortness of breath / dyspnea or wheezing 8.5 g 1     amphetamine-dextroamphetamine (ADDERALL) 20 MG tablet Take 1 tablet (20 mg) by mouth 2 times daily 60 tablet 0     cholecalciferol (VITAMIN D-1000 MAX ST) 1000 units TABS Take 1,000 Units by mouth       Cyanocobalamin (VITAMIN B12 PO)        fluticasone (FLONASE) 50 MCG/ACT nasal spray Spray 1 spray into both nostrils daily 16 g 1     Multiple Vitamins-Minerals (MULTIVITAMIN ADULT PO) Take by mouth daily         Allergies   Allergen Reactions     Codeine Nausea     Dust Mites      Latex        Patient's past medical, surgical, social and family histories are reviewed today.  There are no significant contributory medical issues to todays visit.  REVIEW OF SYSTEMS:  NO NEW CHANGES TO:  CONSTITUTIONAL:  NEGATIVE for fever, chills, change in weight  INTEGUMENTARY/SKIN:  NEGATIVE for worrisome rashes, moles or lesions  EYES:  NEGATIVE for vision changes or irritation  ENT/MOUTH:  NEGATIVE for ear, mouth and throat problems  RESP:  NEGATIVE for significant cough or SOB  BREAST:  NEGATIVE for masses, tenderness or discharge  CV:  NEGATIVE for chest pain, palpitations or peripheral edema  GI:  NEGATIVE for nausea, abdominal pain, heartburn, or change in bowel habits  :  Negative   MUSCULOSKELETAL:  See HPI above  NEURO:  NEGATIVE for weakness, dizziness or paresthesias  ENDOCRINE:  NEGATIVE for temperature intolerance, skin/hair changes  HEME/ALLERGY/IMMUNE:  NEGATIVE for bleeding problems  PSYCHIATRIC:  NEGATIVE for changes in mood or affect      PHYSICAL EXAM:  LMP  (LMP Unknown)   There is no height or weight on file to calculate BMI.   GENERAL APPEARANCE: healthy, well nourished.  Overweight.   NEURO: she is alert and oriented x 3, mentation intact and speech normal  POSTURE: Stands with normal weight bearing line. Presents with crutches ill fitted and right knee brace.  PSYCH:  mentation appears normal and affect normal/bright    MUSCULOSKELETAL: Examination of Right knee.  GROSS OBSERVATION:  Mild effusion, small ecchymosis at distal patella pole without major hematoma, deformity or lesions.  PALPATION: Mild soreness over MCL, no joint line tenderness, defect of quad/patella tendons, no patella crepitus or pain.    ROM:  Right:  FLexion 0-120+.  Left: symmetric  LIGAMENTOUS:  Intact all planes.  STRENGTH:  Flexion/Ext 5/5  SPECIAL TESTS:  Lachmans, McMurrays negative.    CONTRALATERAL JOINT:  no overlying skin change, observable deformity, or effusion; range of motion as noted above; strength and function are within normal limits; no obvious ligamentous instability.     SKIN: As above., no lesions, erythema noted bilaterally.  VASCULATURE:  Good capillary refill bilaterally.  SENSATION: light touch intact, Otherwise no gross deficits noted.   COORDINATION:  Able to move joint within above stated ROM with good control.    Imaging Interpretation:       XR of RIght knee:  Images reviewed, mild medial compartment and PF degeneration, no pathology or fracture noted.    XR of  left hand    Images reviewed, mild degenerative changes of the thumb otherwise no fractures or pathology noted.    ASSESSMENT:  1. Joint injury of right knee, initial encounter    2. Acute pain of right knee    3. Pain of left hand      Right knee strain, strain injury.    PLAN:    1.  Right knee structurally intact, Full WB, can gradually return to actiivites  - RICE, NSAIDs  - Crutches PRN.  - follow up if not resolving.  2.  LEft hand _ mild trauma, improved.    Follow up in clinic in 6 weeks if not improved.    A total of 30 minutes spent with patient on care and care coordinating activities.  .    Peewee Kevin,  AZAEL  [facility] - Surgery

## 2021-12-31 NOTE — LETTER
12/31/2021         RE: Joanne Padilla  [address]   [address]   [facility] [address]        Dear Colleague,    Thank you for referring your patient, Joanne Padilla, to the [facility] [place]. Please see a copy of my visit note below.    HISTORY OF PRESENT ILLNESS:    Joanne Padilla is a 61 year old female who is seen in consultation at the request of Dr. Harp for Fall 12/28/2021, with right knee and left hand pain.    Present symptoms:  Symptoms began 3 days ago.  Patient describes injury as a fall outside on glare ice during which he fell on her Leftt hand and right knee at the tibial tubercle.  She reports soreness at the right knee pain that is located diffusely at rest and at the inside of the knee with certain motions; radiation Present with proximal quad pain.  Pt states the left hypothenar pain is mainly resolving and can use crutch without issues. Right knee Pain is 4/10 in maximal severity, and 0/10 currently.  Symptoms are generally worse with/at walking, standing after sitting; better with/at rest.   Overall the patient feels the condition is improving rather than worsening. Other treatment so far has consisted of Ice first couple days, occasional tylenol or ibuprofe, Elevation and currently using knee brace and crutches when out of the home with moderate relief. States she does struggle to use crutches well. Associated symptoms: has not had swelling, locking, or catching; denies buckling episodes or instability sensation.  She denies history of similar or related problems.    Orthopedic PMH: hx CMC arthritis otherwise none known.   Past Medical History:   Diagnosis Date     Allergic rhinitis, cause unspecified      Anxiety state, unspecified      Atopic dermatitis 10/10/2011     Cardiac dysrhythmia, unspecified      Chronic rhinitis 10/10/2011     Colitis      Exhaustion due to excessive exertion(994.5)      Gallstones      Hypercholesterolemia       Insomnia, unspecified      Major depressive disorder, single episode, unspecified      Migraines 2012     Osteoarthritis of CMC joint of thumb      Other acne      Other malaise and fatigue      Palpitations      PONV (postoperative nausea and vomiting)      Uncomplicated asthma     Occasional       Past Surgical History:   Procedure Laterality Date     BIOPSY      breast lumps      SECTION       COLONOSCOPY       DAVINCI LAPAROSCOPIC CHOLECYSTECTOMY WITHOUT GRAMS  2013    Procedure: DAVINCI LAPAROSCOPIC CHOLECYSTECTOMY WITHOUT GRAMS;  DAVINCI CHOLECYSTECTOMY;  Surgeon: Jac Stallings MD;  Location: [facility] OR     ENT SURGERY      deviated septum, blocked nasal passage     GYN SURGERY      repair of fallopian tubes and ovaries     LUMPECTOMY BREAST       LUMPECTOMY BREAST       NOSE SURGERY         Family History   Problem Relation Age of Onset     C.A.D. Mother         MI early 50s;  59 heart problems     Breast Cancer Mother         Cancer Unknown Primary     Other Cancer Mother      Anesthesia Reaction Mother      C.A.D. Father      Diabetes Father      Breast Cancer Maternal Grandmother         Cancer Unknown Primary     Anxiety Disorder Sister      Thyroid Disease Paternal Grandmother        Social History     Socioeconomic History     Marital status:      Spouse name: Not on file     Number of children: Not on file     Years of education: Not on file     Highest education level: Not on file   Occupational History     Not on file   Tobacco Use     Smoking status: Former Smoker     Packs/day: 0.50     Years: 15.00     Pack years: 7.50     Types: Cigarettes     Smokeless tobacco: Former User     Quit date: 10/1/2021     Tobacco comment: has her chantix   Substance and Sexual Activity     Alcohol use: No     Drug use: No     Sexual activity: Not Currently     Partners: Male     Birth control/protection: Pill, None   Other Topics Concern     Parent/sibling w/ CABG, MI or  angioplasty before 65F 55M? No   Social History Narrative     Not on file     Social Determinants of Health     Financial Resource Strain: Not on file   Food Insecurity: Not on file   Transportation Needs: Not on file   Physical Activity: Not on file   Stress: Not on file   Social Connections: Not on file   Intimate Partner Violence: Not on file   Housing Stability: Not on file       Current Outpatient Medications   Medication Sig Dispense Refill     albuterol (VENTOLIN HFA) 108 (90 Base) MCG/ACT inhaler Inhale 2 puffs into the lungs every 6 hours as needed for shortness of breath / dyspnea or wheezing 8.5 g 1     amphetamine-dextroamphetamine (ADDERALL) 20 MG tablet Take 1 tablet (20 mg) by mouth 2 times daily 60 tablet 0     cholecalciferol (VITAMIN D-1000 MAX ST) 1000 units TABS Take 1,000 Units by mouth       Cyanocobalamin (VITAMIN B12 PO)        fluticasone (FLONASE) 50 MCG/ACT nasal spray Spray 1 spray into both nostrils daily 16 g 1     Multiple Vitamins-Minerals (MULTIVITAMIN ADULT PO) Take by mouth daily         Allergies   Allergen Reactions     Codeine Nausea     Dust Mites      Latex        Patient's past medical, surgical, social and family histories are reviewed today.  There are no significant contributory medical issues to todays visit.  REVIEW OF SYSTEMS:  NO NEW CHANGES TO:  CONSTITUTIONAL:  NEGATIVE for fever, chills, change in weight  INTEGUMENTARY/SKIN:  NEGATIVE for worrisome rashes, moles or lesions  EYES:  NEGATIVE for vision changes or irritation  ENT/MOUTH:  NEGATIVE for ear, mouth and throat problems  RESP:  NEGATIVE for significant cough or SOB  BREAST:  NEGATIVE for masses, tenderness or discharge  CV:  NEGATIVE for chest pain, palpitations or peripheral edema  GI:  NEGATIVE for nausea, abdominal pain, heartburn, or change in bowel habits  :  Negative   MUSCULOSKELETAL:  See HPI above  NEURO:  NEGATIVE for weakness, dizziness or paresthesias  ENDOCRINE:  NEGATIVE for temperature  intolerance, skin/hair changes  HEME/ALLERGY/IMMUNE:  NEGATIVE for bleeding problems  PSYCHIATRIC:  NEGATIVE for changes in mood or affect      PHYSICAL EXAM:  LMP  (LMP Unknown)   There is no height or weight on file to calculate BMI.   GENERAL APPEARANCE: healthy, well nourished. Overweight.   NEURO: she is alert and oriented x 3, mentation intact and speech normal  POSTURE: Stands with normal weight bearing line. Presents with crutches ill fitted and right knee brace.  PSYCH:  mentation appears normal and affect normal/bright    MUSCULOSKELETAL: Examination of Right knee.  GROSS OBSERVATION:  Mild effusion, small ecchymosis at distal patella pole without major hematoma, deformity or lesions.  PALPATION: Mild soreness over MCL, no joint line tenderness, defect of quad/patella tendons, no patella crepitus or pain.    ROM:  Right:  FLexion 0-120+.  Left: symmetric  LIGAMENTOUS:  Intact all planes.  STRENGTH:  Flexion/Ext 5/5  SPECIAL TESTS:  Lachmans, McMurrays negative.    CONTRALATERAL JOINT:  no overlying skin change, observable deformity, or effusion; range of motion as noted above; strength and function are within normal limits; no obvious ligamentous instability.     SKIN: As above., no lesions, erythema noted bilaterally.  VASCULATURE:  Good capillary refill bilaterally.  SENSATION: light touch intact, Otherwise no gross deficits noted.   COORDINATION:  Able to move joint within above stated ROM with good control.    Imaging Interpretation:       XR of RIght knee:  Images reviewed, mild medial compartment and PF degeneration, no pathology or fracture noted.    XR of  left hand    Images reviewed, mild degenerative changes of the thumb otherwise no fractures or pathology noted.    ASSESSMENT:  1. Joint injury of right knee, initial encounter    2. Acute pain of right knee    3. Pain of left hand      Right knee strain, strain injury.    PLAN:    1.  Right knee structurally intact, Full WB, can gradually return  to actiivites  - RICE, NSAIDs  - Crutches PRN.  - follow up if not resolving.  2.  LEft hand _ mild trauma, improved.    Follow up in clinic in 6 weeks if not improved.    A total of 30 minutes spent with patient on care and care coordinating activities.  .    Peewee Kevin PA-C  [facility] - Surgery        Again, thank you for allowing me to participate in the care of your patient.        Sincerely,        Peewee Kevin PA-C

## 2021-12-31 NOTE — PATIENT INSTRUCTIONS
May use crutches as needed for relief.    The knee may have occasional sharp pains with twisting or straining for several months but it should not be a regular occurrence.    Follow up if not 80-90% improved into February 2022.    Patient Education     Self-Care for Strains and Sprains  Most minor strains and sprains can be treated with self-care. Recovering from a strain or sprain may take 6 to 8 weeks. Your self-care goal is to reduce pain and immobilize the injury to speed healing.  Support the injured area  Wrapping the injured area provides support for short, necessary activities. Be careful not to wrap the area too tightly. This could cut off the blood supply.    Support a wrist, elbow, or shoulder with a sling.    Wrap an ankle or knee with an elastic bandage.    Tape a finger or toe to the one next to it.  Use cold and heat  Cold reduces swelling. Both cold and heat reduce pain. Heat should not be used in the initial treatment of the injury. When using cold or heat, always place a thin towel between the pack and your skin.    Apply ice or a cold pack 10 to 15 minutes every hour you re awake for the first 2 days.    After the swelling goes down, use cold or heat to control pain. Don t use heat late in the day, since it can cause swelling when you re not active.  Rest and elevate  Rest and elevation help your injury heal faster.    Raise the injured area above your heart level.    Keep the injured area from moving.    Limit the use of the joint or limb.  Use medicine    Aspirin reduces pain and swelling. (Note: Don t give aspirin to a child 18 or younger unless prescribed by the doctor.)    Non-steroidal anti-inflammatory medicines, such as ibuprofen, may reduce pain and swelling, as well. Ask your healthcare provider for advice.    When to call your healthcare provider  Call your healthcare provider if:    The injured joint won t move, or bones make a grating sound when they move    You can t put weight on  the injured area, even after 24 hours    The injured body part is cold, blue, tingling, or numb    The joint or limb appears bent or crooked.    Pain increases or doesn t improve in 4 days    When pressing along the injured area, you notice a spot that is especially painful  Antonio last reviewed this educational content on 5/1/2018 2000-2021 [employer]. All rights reserved. This information is not intended as a substitute for professional medical care. Always follow your healthcare professional's instructions.

## 2022-01-11 ENCOUNTER — MYC REFILL (OUTPATIENT)
Dept: FAMILY MEDICINE | Facility: CLINIC | Age: 62
End: 2022-01-11
Payer: COMMERCIAL

## 2022-01-11 DIAGNOSIS — F98.8 ATTENTION DEFICIT DISORDER (ADD) WITHOUT HYPERACTIVITY: ICD-10-CM

## 2022-01-12 RX ORDER — DEXTROAMPHETAMINE SACCHARATE, AMPHETAMINE ASPARTATE, DEXTROAMPHETAMINE SULFATE AND AMPHETAMINE SULFATE 5; 5; 5; 5 MG/1; MG/1; MG/1; MG/1
20 TABLET ORAL 2 TIMES DAILY
Qty: 60 TABLET | Refills: 0 | Status: SHIPPED | OUTPATIENT
Start: 2022-01-12 | End: 2022-07-07

## 2022-01-12 NOTE — TELEPHONE ENCOUNTER
Visit 12/10/21. Did not mention Adderall.   PDMP Review       Value Time User    State PDMP site checked  Yes 1/12/2022  2:42 PM Jane Duarte MD          12/13/2021  1   12/13/2021  Dextroamp-Amphetamin 20 MG Tab    60.00  30 Da Nickie   [id number]   Bennie ([id number])   0/0   [place] Ins   [place]   11/15/2021  1   11/15/2021  Dextroamp-Amphetamin 20 MG Tab    60.00  30 Da Nickie   [id number]   Bennie ([id number])   0/0   [place]   [place]     Refilled.

## 2022-02-10 DIAGNOSIS — F98.8 ATTENTION DEFICIT DISORDER (ADD) WITHOUT HYPERACTIVITY: ICD-10-CM

## 2022-02-10 NOTE — TELEPHONE ENCOUNTER
Adderall 20 mg    Last Written Prescription Date:  1/12/2022  Last Fill Quantity: 60,  # refills: 0   Last office visit: 12/10/2021 with prescribing provider:    Future Office Visit:      Radha Arguello RN

## 2022-02-11 RX ORDER — DEXTROAMPHETAMINE SACCHARATE, AMPHETAMINE ASPARTATE, DEXTROAMPHETAMINE SULFATE AND AMPHETAMINE SULFATE 5; 5; 5; 5 MG/1; MG/1; MG/1; MG/1
20 TABLET ORAL 2 TIMES DAILY
Qty: 60 TABLET | Refills: 0 | Status: SHIPPED | OUTPATIENT
Start: 2022-04-14 | End: 2022-05-14

## 2022-02-11 RX ORDER — DEXTROAMPHETAMINE SACCHARATE, AMPHETAMINE ASPARTATE, DEXTROAMPHETAMINE SULFATE AND AMPHETAMINE SULFATE 5; 5; 5; 5 MG/1; MG/1; MG/1; MG/1
20 TABLET ORAL 2 TIMES DAILY
Qty: 60 TABLET | Refills: 0 | Status: SHIPPED | OUTPATIENT
Start: 2022-03-14 | End: 2022-04-13

## 2022-02-11 RX ORDER — DEXTROAMPHETAMINE SACCHARATE, AMPHETAMINE ASPARTATE, DEXTROAMPHETAMINE SULFATE AND AMPHETAMINE SULFATE 5; 5; 5; 5 MG/1; MG/1; MG/1; MG/1
20 TABLET ORAL 2 TIMES DAILY
Qty: 60 TABLET | Refills: 0 | Status: SHIPPED | OUTPATIENT
Start: 2022-02-11 | End: 2022-03-13

## 2022-02-11 RX ORDER — DEXTROAMPHETAMINE SACCHARATE, AMPHETAMINE ASPARTATE, DEXTROAMPHETAMINE SULFATE AND AMPHETAMINE SULFATE 5; 5; 5; 5 MG/1; MG/1; MG/1; MG/1
20 TABLET ORAL 2 TIMES DAILY
Qty: 60 TABLET | Refills: 0 | OUTPATIENT
Start: 2022-02-11

## 2022-02-17 PROBLEM — T73.3XXA EXHAUSTION DUE TO EXCESSIVE EXERTION: Status: ACTIVE | Noted: 2019-10-11

## 2022-03-14 ENCOUNTER — TELEPHONE (OUTPATIENT)
Dept: LAB | Facility: CLINIC | Age: 62
End: 2022-03-14

## 2022-03-14 NOTE — TELEPHONE ENCOUNTER
Prior Authorization Retail Medication Request    Medication/Dose: adderall 20mg  ICD code (if different than what is on RX):    Previously Tried and Failed:    Rationale:      Insurance Name:  LDI  Insurance ID:  [id number]      Pharmacy Information (if different than what is on RX)    Chanel Baker CPhT  [facility]  [address] [place] [address] [phone]

## 2022-03-18 NOTE — TELEPHONE ENCOUNTER
PA Initiation    Medication: adderall 20mg  Insurance Company:    Pharm[place]illing the [place] [facility] [place] [address]  Filling Pharmacy Phone: [phone]  Filling Pharmacy Fax:    Start Date: 3/18/2022

## 2022-03-21 NOTE — TELEPHONE ENCOUNTER
Prior Authorization Approval    Authorization Effective Date: 3/18/2022  Authorization Expiration Date: 3/18/2023  Medication: adderall 20mg  Approved Dose/Quantity: 60  Reference #:     Insurance Company:    Expected CoPay:       CoPay Card Available:      Foundation Assistance Needed:    Which Pharmacy is filling the prescription (Not needed for infusion/clinic administered): [facility] [place] [address] [facility]  Pharmacy Notified: Yes  Patient Notified: Yes

## 2022-04-11 ENCOUNTER — TELEPHONE (OUTPATIENT)
Dept: OPHTHALMOLOGY | Facility: CLINIC | Age: 62
End: 2022-04-11
Payer: COMMERCIAL

## 2022-04-25 ENCOUNTER — OFFICE VISIT (OUTPATIENT)
Dept: OPHTHALMOLOGY | Facility: CLINIC | Age: 62
End: 2022-04-25
Payer: COMMERCIAL

## 2022-04-25 ENCOUNTER — LAB (OUTPATIENT)
Dept: LAB | Facility: CLINIC | Age: 62
End: 2022-04-25

## 2022-04-25 DIAGNOSIS — H05.20 OCULAR PROPTOSIS: ICD-10-CM

## 2022-04-25 DIAGNOSIS — H05.20 OCULAR PROPTOSIS: Primary | ICD-10-CM

## 2022-04-25 DIAGNOSIS — H04.123 DRY EYE SYNDROME OF BOTH EYES: ICD-10-CM

## 2022-04-25 DIAGNOSIS — H02.889 MGD (MEIBOMIAN GLAND DYSFUNCTION): ICD-10-CM

## 2022-04-25 DIAGNOSIS — H52.4 PRESBYOPIA: ICD-10-CM

## 2022-04-25 DIAGNOSIS — H40.033 ANATOMICAL NARROW ANGLE BORDERLINE GLAUCOMA OF BOTH EYES: ICD-10-CM

## 2022-04-25 LAB
T3 SERPL-MCNC: 114 NG/DL (ref 60–181)
TSH SERPL DL<=0.005 MIU/L-ACNC: 2.26 MU/L (ref 0.4–4)

## 2022-04-25 PROCEDURE — 36415 COLL VENOUS BLD VENIPUNCTURE: CPT

## 2022-04-25 PROCEDURE — 86376 MICROSOMAL ANTIBODY EACH: CPT

## 2022-04-25 PROCEDURE — 99203 OFFICE O/P NEW LOW 30 MIN: CPT | Performed by: OPHTHALMOLOGY

## 2022-04-25 PROCEDURE — 84480 ASSAY TRIIODOTHYRONINE (T3): CPT

## 2022-04-25 PROCEDURE — 84443 ASSAY THYROID STIM HORMONE: CPT

## 2022-04-25 PROCEDURE — 86800 THYROGLOBULIN ANTIBODY: CPT

## 2022-04-25 ASSESSMENT — VISUAL ACUITY
OD_SC: 20/20
OS_SC: 20/20
OD_SC+: -1
OS_SC+: -2
METHOD: SNELLEN - LINEAR

## 2022-04-25 ASSESSMENT — TONOMETRY
OS_IOP_MMHG: 16
IOP_METHOD: ICARE
OD_IOP_MMHG: 16

## 2022-04-25 ASSESSMENT — CONF VISUAL FIELD
METHOD: COUNTING FINGERS
OS_NORMAL: 1
OD_NORMAL: 1

## 2022-04-25 ASSESSMENT — SLIT LAMP EXAM - LIDS: COMMENTS: 1+ BLEPHARITIS, 2+ MEIBOMIAN GLAND DYSFUNCTION

## 2022-04-25 NOTE — NURSING NOTE
Chief Complaints and History of Present Illnesses   Patient presents with     Eye Problem Both Eyes     Chief Complaint(s) and History of Present Illness(es)     Eye Problem Both Eyes     Laterality: both eyes    Course: gradually worsening    Associated symptoms: eye pain, redness, tearing, itching, discharge and swelling    Treatments tried: eye drops    Pain scale: 2/10              Comments     Pt complains of BE feeling itchy, swollen, tearing, and being red - gradually worsening over the last year.  Complains of BE being crusty in the mornings.  Complains of having occasional bumps on lids, bilateral.  Complains of upper lashes on both eyes seeming to be growing funny and falling out.  Complains of BE feeling sore occasionally.  Complains of having trouble distinguishing the difference between colors and BE being light sensitive.  Ocular meds: Allergy drops PRN BE    Brianna Joyce OT 9:12 AM April 25, 2022

## 2022-04-25 NOTE — PROGRESS NOTES
"HPI     Eye Problem Both Eyes     In both eyes.  Since onset it is gradually worsening.  Associated symptoms include eye pain, redness, tearing, itching, discharge and swelling.  Treatments tried include eye drops.  Pain was noted as 2/10.              Comments     Pt complains of BE feeling itchy, swollen, tearing, and being red -  x 5 years, gradually worsening over the last year. She has a known dust allergy. Not taking anything currently.   Complains of BE being crusty in the mornings.  Complains of having occasional bumps on lids, bilateral.  Complains of upper lashes on both eyes seeming to be growing funny and falling out.  Complains of BE feeling sore occasionally.  Complains of having trouble distinguishing the difference between colors and BE being light sensitive.  Ocular meds: Allergy drops PRN BE    Notes occasional left eye monocular diplopia.     Family history of \"thyroid problems\". TSH nml.   Brianna Joyce OT 9:12 AM April 25, 2022             Last edited by Alexander Merlos MD on 4/25/2022  9:37 AM. (History)         Review of systems for the eyes was negative other than the pertinent positives/negatives listed in the HPI.      Assessment & Plan    HPI:  Joanne Padilla is a 61 year old female who presents for bilateral eye irritation x several years. She notes a strong dust allergy. She is not currently doing any treatment.    POHx: denies  PMHx: ADHD  Current Medications: albuterol (VENTOLIN HFA) 108 (90 Base) MCG/ACT inhaler, Inhale 2 puffs into the lungs every 6 hours as needed for shortness of breath / dyspnea or wheezing  amphetamine-dextroamphetamine (ADDERALL) 20 MG tablet, Take 1 tablet (20 mg) by mouth 2 times daily  amphetamine-dextroamphetamine (ADDERALL) 20 MG tablet, Take 1 tablet (20 mg) by mouth 2 times daily  cholecalciferol (VITAMIN D-1000 MAX ST) 1000 units TABS, Take 1,000 Units by mouth  Cyanocobalamin (VITAMIN B12 PO),   fluticasone (FLONASE) 50 MCG/ACT " "nasal spray, Spray 1 spray into both nostrils daily  Multiple Vitamins-Minerals (MULTIVITAMIN ADULT PO), Take by mouth daily    No current facility-administered medications on file prior to visit.    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  (primary encounter diagnosis)  Prior TSH nml  Significant superior and inferior scleral show with dry eye today  Family history of \"thyroid problems\" but unknown diagnosis  Plan: ANTI THYROGLOBULIN ANTIBODY, T3 TOTAL, TSH,         THYROID PEROXIDASE ANTIBODY            (H04.123) Dry eye syndrome of both eyes  (H02.889) MGD (meibomian gland dysfunction)  Start artificial tears four times daily (best used before reading, using a computer or watching TV)  Start warm compresses for five minute twice daily (warm wash cloth or a microwaved, uncooked bag of rice in a clean sock)   Start lid hygiene (Ocusoft lid scrubs or gently scrubbing the upper and lower lids baby shampoo)    (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        Alexander Merlos MD     Attending Physician Attestation:  Complete documentation of historical and exam elements from today's encounter can be found in the full encounter summary report (not reduplicated in this progress note).  I personally obtained the chief complaint(s) and history of present illness.  I confirmed and edited as necessary the review of systems, past medical/surgical history, family history, social history, and examination findings as documented by others; and I examined the patient myself.  I personally reviewed the relevant tests, images, and reports as documented " above.  I formulated and edited as necessary the assessment and plan and discussed the findings and management plan with the patient and family. - Alexander Merlos MD

## 2022-04-25 NOTE — PATIENT INSTRUCTIONS
"Hot compresses, mechanical lid cleansing with OcuSoft lid scrubs or baby shampoo, oral omega 3's, and artificial tears. Normal lid anatomy was discussed including the oil producing glands and its role in blepharitis. Strategies were reviewed for helping a chalazion resolve when one is present. This includes hot compresses twice a day using an uncooked bag of rice in a clean tube sock followed by ophthalmic ointment. Prevention of new lesions may be tried using daily mechanical lid scrubs. Finally, oral Omega 3's were discussed which have been found to be beneficial in patients with meibomian gland disease. The patient understands that this is a chronic condition that will not disappear overnight.     Use one drop of artificial tears both eyes 3-4 x daily.  Continue to use the drops regardless if your eyes are comfortable.  Artificial tears work best as a preventative and not as well after your eyes are starting to bother you.  Preservative-free drops are best if you will be using them more than 6x daily. Some brands include: Celluvisc, Refresh, Systane, Blink, Optive. Avoid using any drops that state \"get the red out\".   It may take 4-6 weeks of using the drops before you notice improvement.  If after that time you are still having problems schedule an appointment for an evaluation and discussion of different treatments which may include medicated eye drops, punctal plugs to keep the tears that are made and supplemented on the surface of the eye longer, or other treatments. Dry eyes are a chronic condition and you may have more symptoms at certain times of the year.      "

## 2022-04-26 LAB
THYROGLOB AB SERPL IA-ACNC: <20 IU/ML
THYROPEROXIDASE AB SERPL-ACNC: <10 IU/ML

## 2022-05-13 DIAGNOSIS — F98.8 ATTENTION DEFICIT DISORDER (ADD) WITHOUT HYPERACTIVITY: Primary | ICD-10-CM

## 2022-05-13 RX ORDER — DEXTROAMPHETAMINE SACCHARATE, AMPHETAMINE ASPARTATE, DEXTROAMPHETAMINE SULFATE AND AMPHETAMINE SULFATE 5; 5; 5; 5 MG/1; MG/1; MG/1; MG/1
TABLET ORAL
Qty: 60 TABLET | Refills: 0 | Status: SHIPPED | OUTPATIENT
Start: 2022-05-13 | End: 2022-07-07

## 2022-06-15 DIAGNOSIS — F98.8 ATTENTION DEFICIT DISORDER (ADD) WITHOUT HYPERACTIVITY: Primary | ICD-10-CM

## 2022-06-15 DIAGNOSIS — J30.9 ALLERGIC RHINITIS, UNSPECIFIED SEASONALITY, UNSPECIFIED TRIGGER: ICD-10-CM

## 2022-06-15 RX ORDER — ALBUTEROL SULFATE 90 UG/1
2 AEROSOL, METERED RESPIRATORY (INHALATION) EVERY 6 HOURS PRN
Qty: 8.5 G | Refills: 1 | Status: SHIPPED | OUTPATIENT
Start: 2022-06-15 | End: 2023-05-05

## 2022-06-15 RX ORDER — DEXTROAMPHETAMINE SACCHARATE, AMPHETAMINE ASPARTATE, DEXTROAMPHETAMINE SULFATE AND AMPHETAMINE SULFATE 5; 5; 5; 5 MG/1; MG/1; MG/1; MG/1
TABLET ORAL
Qty: 60 TABLET | Refills: 0 | Status: SHIPPED | OUTPATIENT
Start: 2022-06-15 | End: 2023-05-05

## 2022-06-15 NOTE — TELEPHONE ENCOUNTER
Routing to pcp to see if rx can be filled, patient is aware that she needs to have an appointment, unsure if this needs to be a FTF or Virtual, advise will call her back & sent a [employer] message to her when provider comes into clinic on 06/16/22

## 2022-06-24 ENCOUNTER — OFFICE VISIT (OUTPATIENT)
Dept: OTOLARYNGOLOGY | Facility: CLINIC | Age: 62
End: 2022-06-24
Payer: COMMERCIAL

## 2022-06-24 VITALS
OXYGEN SATURATION: 98 % | SYSTOLIC BLOOD PRESSURE: 133 MMHG | WEIGHT: 201 LBS | HEART RATE: 96 BPM | DIASTOLIC BLOOD PRESSURE: 99 MMHG | BODY MASS INDEX: 30.46 KG/M2 | HEIGHT: 68 IN

## 2022-06-24 DIAGNOSIS — H81.10 BENIGN PAROXYSMAL POSITIONAL VERTIGO, UNSPECIFIED LATERALITY: ICD-10-CM

## 2022-06-24 DIAGNOSIS — J01.01 ACUTE RECURRENT MAXILLARY SINUSITIS: Primary | ICD-10-CM

## 2022-06-24 PROCEDURE — 99204 OFFICE O/P NEW MOD 45 MIN: CPT | Performed by: OTOLARYNGOLOGY

## 2022-06-24 ASSESSMENT — PAIN SCALES - GENERAL: PAINLEVEL: SEVERE PAIN (6)

## 2022-06-24 ASSESSMENT — ENCOUNTER SYMPTOMS
NAUSEA: 0
SORE THROAT: 0
VOMITING: 0
BLURRED VISION: 1
DOUBLE VISION: 0
CONSTITUTIONAL NEGATIVE: 1
COUGH: 0
SPUTUM PRODUCTION: 0
STRIDOR: 0
SINUS PAIN: 1
PHOTOPHOBIA: 0
HEARTBURN: 0
HEMOPTYSIS: 0

## 2022-06-24 NOTE — PROGRESS NOTES
Joanne Padilla's goals for this visit include:   Chief Complaint   Patient presents with     New Patient     LM to schedule audiogram prior to appt. Sinusitis, vertigo, dizziness.        She requests these members of her care team be copied on today's visit information: yes    PCP: Fermín Larios    Referring Provider:  No referring provider defined for this encounter.    There were no vitals taken for this visit.    Do you need any medication refills at today's visit? No  ESTELA Sanchez, AZEEM ([facility])

## 2022-06-24 NOTE — LETTER
6/24/2022         RE: Joanne Padilla  [address]   [address]   [facility] [address]        Dear Colleague,    Thank you for referring your patient, Joanne Padilla, to the [facility]. Please see a copy of my visit note below.    HPI     This pleasant patient is having headache for more than 5 years. Everyday, pressure type headache, especially in the right face. States nasal congestion and runny nose. Describes nose bleeds from the right nostril everyday.  She also describes lightheadedness, dizzy spells when she stands up quickly, looks up, or turns around. Lasts several minutes. Describes motion related dizziness. She has nausea, vomiting, tinnitus, aural fullness and pressure. She has a hx of Migraine, tonsillectomy and nasal surgeries in 1990s. She has hx of grinding and clenching. No hx of trauma.      ENT Problem List  Chronic rhinitis    Migraines    Allergic rhinitis    Atopic dermatitis    Exhaustion due to excessive exertion(994.5)        Medications         albuterol (PROAIR HFA/PROVENTIL HFA/VENTOLIN HFA) 108 (90 Base) MCG/ACT inhaler      amphetamine-dextroamphetamine (ADDERALL) 20 MG tablet      fluticasone (FLONASE) 50 MCG/ACT nasal spray      Multiple Vitamins-Minerals (MULTIVITAMIN ADULT PO)      amphetamine-dextroamphetamine (ADDERALL) 20 MG tablet      amphetamine-dextroamphetamine (ADDERALL) 20 MG tablet      cholecalciferol 25 MCG (1000 UT) TABS      Cyanocobalamin (VITAMIN B12 PO)        Review of Systems   Constitutional: Negative.    HENT: Positive for congestion, hearing loss, nosebleeds, sinus pain and tinnitus. Negative for ear discharge, ear pain and sore throat.    Eyes: Positive for blurred vision. Negative for double vision and photophobia.   Respiratory: Negative for cough, hemoptysis, sputum production and stridor.    Gastrointestinal: Negative for heartburn, nausea and vomiting.   Skin: Negative.    Endo/Heme/Allergies: Positive for  environmental allergies.         Physical Exam  Vitals reviewed.   Constitutional:       Appearance: Normal appearance.   HENT:      Head: Normocephalic and atraumatic.      Right Ear: Tympanic membrane, ear canal and external ear normal. No middle ear effusion. There is no impacted cerumen.      Left Ear: Tympanic membrane, ear canal and external ear normal.  No middle ear effusion. There is no impacted cerumen.      Nose: Congestion and rhinorrhea present.      Right Turbinates: Swollen.      Left Turbinates: Swollen.      Mouth/Throat:      Mouth: Mucous membranes are moist.   Eyes:      Extraocular Movements: Extraocular movements intact.      Pupils: Pupils are equal, round, and reactive to light.   Neurological:      Mental Status: She is alert.     Septum is slightly deviated to the left.  Right Kiesselbach region is crusty and bloody. Cauterized with silver nitrate following numbing after 2% Lidocaine and epinephrine.    A/P    This pleasant patient is having positional dizzy spells, I will refer her to vestibular rehab for York Hallpike and request a CT to see her sinuses. She has a hx of Migraine, s/p septoplasty many years ago.      Joanne Padilla's goals for this visit include:   Chief Complaint   Patient presents with     New Patient      to schedule audiogram prior to appt. Sinusitis, vertigo, dizziness.        She requests these members of her care team be copied on today's visit information: yes    PCP: Fermín Larios    Referring Provider:  No referring provider defined for this encounter.    There were no vitals taken for this visit.    Do you need any medication refills at today's visit? No  ESTELA Sanchez, AZEEM ([facility])          Again, thank you for allowing me to participate in the care of your patient.        Sincerely,        Dominik Peter MD

## 2022-06-24 NOTE — PROGRESS NOTES
HPI     This pleasant patient is having headache for more than 5 years. Everyday, pressure type headache, especially in the right face. States nasal congestion and runny nose. Describes nose bleeds from the right nostril everyday.  She also describes lightheadedness, dizzy spells when she stands up quickly, looks up, or turns around. Lasts several minutes. Describes motion related dizziness. She has nausea, vomiting, tinnitus, aural fullness and pressure. She has a hx of Migraine, tonsillectomy and nasal surgeries in 1990s. She has hx of grinding and clenching. No hx of trauma.      ENT Problem List  Chronic rhinitis    Migraines    Allergic rhinitis    Atopic dermatitis    Exhaustion due to excessive exertion(994.5)        Medications         albuterol (PROAIR HFA/PROVENTIL HFA/VENTOLIN HFA) 108 (90 Base) MCG/ACT inhaler      amphetamine-dextroamphetamine (ADDERALL) 20 MG tablet      fluticasone (FLONASE) 50 MCG/ACT nasal spray      Multiple Vitamins-Minerals (MULTIVITAMIN ADULT PO)      amphetamine-dextroamphetamine (ADDERALL) 20 MG tablet      amphetamine-dextroamphetamine (ADDERALL) 20 MG tablet      cholecalciferol 25 MCG (1000 UT) TABS      Cyanocobalamin (VITAMIN B12 PO)        Review of Systems   Constitutional: Negative.    HENT: Positive for congestion, hearing loss, nosebleeds, sinus pain and tinnitus. Negative for ear discharge, ear pain and sore throat.    Eyes: Positive for blurred vision. Negative for double vision and photophobia.   Respiratory: Negative for cough, hemoptysis, sputum production and stridor.    Gastrointestinal: Negative for heartburn, nausea and vomiting.   Skin: Negative.    Endo/Heme/Allergies: Positive for environmental allergies.         Physical Exam  Vitals reviewed.   Constitutional:       Appearance: Normal appearance.   HENT:      Head: Normocephalic and atraumatic.      Right Ear: Tympanic membrane, ear canal and external ear normal. No middle ear effusion. There is no  impacted cerumen.      Left Ear: Tympanic membrane, ear canal and external ear normal.  No middle ear effusion. There is no impacted cerumen.      Nose: Congestion and rhinorrhea present.      Right Turbinates: Swollen.      Left Turbinates: Swollen.      Mouth/Throat:      Mouth: Mucous membranes are moist.   Eyes:      Extraocular Movements: Extraocular movements intact.      Pupils: Pupils are equal, round, and reactive to light.   Neurological:      Mental Status: She is alert.     Septum is slightly deviated to the left.  Right Kiesselbach region is crusty and bloody. Cauterized with silver nitrate following numbing after 2% Lidocaine and epinephrine.    A/P    This pleasant patient is having positional dizzy spells, I will refer her to vestibular rehab for Veronica Mayberry and request a CT to see her sinuses. She has a hx of Migraine, s/p septoplasty many years ago.

## 2022-06-27 ENCOUNTER — ANCILLARY PROCEDURE (OUTPATIENT)
Dept: CT IMAGING | Facility: CLINIC | Age: 62
End: 2022-06-27
Attending: OTOLARYNGOLOGY
Payer: COMMERCIAL

## 2022-06-27 DIAGNOSIS — J01.01 ACUTE RECURRENT MAXILLARY SINUSITIS: ICD-10-CM

## 2022-06-27 PROCEDURE — 70486 CT MAXILLOFACIAL W/O DYE: CPT | Performed by: RADIOLOGY

## 2022-06-30 ENCOUNTER — HOSPITAL ENCOUNTER (OUTPATIENT)
Dept: PHYSICAL THERAPY | Facility: CLINIC | Age: 62
Setting detail: THERAPIES SERIES
Discharge: HOME | End: 2022-06-30
Attending: OTOLARYNGOLOGY
Payer: COMMERCIAL

## 2022-06-30 DIAGNOSIS — H81.10 BENIGN PAROXYSMAL POSITIONAL VERTIGO, UNSPECIFIED LATERALITY: ICD-10-CM

## 2022-06-30 PROCEDURE — 97112 NEUROMUSCULAR REEDUCATION: CPT | Mod: GP | Performed by: PHYSICAL THERAPIST

## 2022-06-30 PROCEDURE — 97161 PT EVAL LOW COMPLEX 20 MIN: CPT | Mod: GP | Performed by: PHYSICAL THERAPIST

## 2022-07-07 ENCOUNTER — OFFICE VISIT (OUTPATIENT)
Dept: FAMILY MEDICINE | Facility: CLINIC | Age: 62
End: 2022-07-07
Payer: COMMERCIAL

## 2022-07-07 VITALS
DIASTOLIC BLOOD PRESSURE: 80 MMHG | RESPIRATION RATE: 18 BRPM | BODY MASS INDEX: 31.63 KG/M2 | WEIGHT: 205 LBS | HEART RATE: 94 BPM | OXYGEN SATURATION: 96 % | TEMPERATURE: 98.3 F | SYSTOLIC BLOOD PRESSURE: 110 MMHG

## 2022-07-07 DIAGNOSIS — G89.29 CHRONIC PAIN OF RIGHT KNEE: ICD-10-CM

## 2022-07-07 DIAGNOSIS — J31.0 CHRONIC RHINITIS: ICD-10-CM

## 2022-07-07 DIAGNOSIS — Z72.0 TOBACCO USE: ICD-10-CM

## 2022-07-07 DIAGNOSIS — Z71.6 ENCOUNTER FOR SMOKING CESSATION COUNSELING: ICD-10-CM

## 2022-07-07 DIAGNOSIS — M25.561 CHRONIC PAIN OF RIGHT KNEE: ICD-10-CM

## 2022-07-07 DIAGNOSIS — G56.22 ULNAR NEUROPATHY OF LEFT UPPER EXTREMITY: Primary | ICD-10-CM

## 2022-07-07 PROCEDURE — 99214 OFFICE O/P EST MOD 30 MIN: CPT | Performed by: PHYSICIAN ASSISTANT

## 2022-07-07 RX ORDER — BUPROPION HYDROCHLORIDE 150 MG/1
150 TABLET, EXTENDED RELEASE ORAL 2 TIMES DAILY
Qty: 180 TABLET | Refills: 0 | Status: SHIPPED | OUTPATIENT
Start: 2022-07-07 | End: 2022-08-23

## 2022-07-07 RX ORDER — NAPROXEN 500 MG/1
500 TABLET ORAL 2 TIMES DAILY WITH MEALS
Qty: 20 TABLET | Refills: 0 | Status: SHIPPED | OUTPATIENT
Start: 2022-07-07 | End: 2022-09-11

## 2022-07-07 ASSESSMENT — PATIENT HEALTH QUESTIONNAIRE - PHQ9
SUM OF ALL RESPONSES TO PHQ QUESTIONS 1-9: 10
SUM OF ALL RESPONSES TO PHQ QUESTIONS 1-9: 10
10. IF YOU CHECKED OFF ANY PROBLEMS, HOW DIFFICULT HAVE THESE PROBLEMS MADE IT FOR YOU TO DO YOUR WORK, TAKE CARE OF THINGS AT HOME, OR GET ALONG WITH OTHER PEOPLE: SOMEWHAT DIFFICULT

## 2022-07-07 ASSESSMENT — PAIN SCALES - GENERAL: PAINLEVEL: NO PAIN (0)

## 2022-07-07 NOTE — PROGRESS NOTES
"  Assessment & Plan     Ulnar neuropathy of left upper extremity  Paper test positive; possibly at elbow; try below and if not improving should see ortho  - naproxen (NAPROSYN) 500 MG tablet; Take 1 tablet (500 mg) by mouth 2 times daily (with meals)    Chronic rhinitis  This has improved since her move; still with mild symptoms      Chronic pain of right knee  Ongoing since fall in Winter. Xray ok but if symptoms persist should see ortho    Encounter for smoking cessation counseling  Tobacco use  INcreased stress this winter caused her to restart. Sister did well on below. Ok to start trial  - buPROPion (WELLBUTRIN SR) 150 MG 12 hr tablet; Take 1 tablet (150 mg) by mouth 2 times daily    Tobacco Cessation:   reports that she has been smoking cigarettes. She has a 7.50 pack-year smoking history. She quit smokeless tobacco use about 9 months ago.  Tobacco Cessation Action Plan: Pharmacotherapies : Zyban/Wellbutrin    BMI:   Estimated body mass index is 31.63 kg/m  as calculated from the following:    Height as of 6/24/22: 1.715 m (5' 7.5\").    Weight as of this encounter: 93 kg (205 lb).       Depression Screening Follow Up    PHQ 7/7/2022   PHQ-9 Total Score 10   Q9: Thoughts of better off dead/self-harm past 2 weeks Not at all   Return in about 6 months (around 1/7/2023).    Fermín Larios PA-C  [facility] SHALONDA WRIGHT is a 62 year old, presenting for the following health issues:  No chief complaint on file.      History of Present Illness       Reason for visit:  Prescription refill and check left hand.    She eats 2-3 servings of fruits and vegetables daily.She consumes 0 sweetened beverage(s) daily.She exercises with enough effort to increase her heart rate 10 to 19 minutes per day.  She exercises with enough effort to increase her heart rate 7 days per week.   She is taking medications regularly.    Today's PHQ-9         PHQ-9 Total Score: 10    PHQ-9 Q9 Thoughts of " better off dead/self-harm past 2 weeks :   Not at all    How difficult have these problems made it for you to do your work, take care of things at home, or get along with other people: Somewhat difficult       Concern - Lumps on fingers  Onset: x 5-6 weeks  Description: left ring finger, and right middle finger  Intensity: mild  Progression of Symptoms:  improving  Accompanying Signs & Symptoms: tingling left finger    Previous history of similar problem: no  Precipitating factors:        Worsened by: left how using hand if the finger hurts  Alleviating factors:        Improved by: resting hand  Therapies tried and outcome: None    Symptoms started around a month ago  First noted a sudden lump along the left 4th digit, flexor side  It was tender  There was no finger locking/catching  It steadily shrunk but having some tingling (mostly the 4th and 5th digits)  Denies any elbow main but mentions if resting on her left elbow the symptoms change      She has been following with ENT/dizzy folks after a fall in winter   -does not think she hit her head  She was getting dizziness that would cause nausea, effect walking  No HA were with the symptoms  BPPV testing was normal; suggestion of vestibular migraines?  Will be undergoing further testing as well  Sinus CT was normal; after moving the sinuses have improved  Symptoms were persistent since winter, surprisingly better since Thursday after doing some exercises  Is getting more sleep as well        Review of Systems   Constitutional, HEENT, cardiovascular, pulmonary, gi and gu systems are negative, except as otherwise noted.      Objective    /80 (BP Location: Right arm, Patient Position: Sitting, Cuff Size: Adult Regular)   Pulse 94   Temp 98.3  F (36.8  C) (Oral)   Resp 18   Wt 93 kg (205 lb)   LMP  (LMP Unknown)   SpO2 96%   BMI 31.63 kg/m    Body mass index is 31.63 kg/m .  Physical Exam   GENERAL: healthy, alert and no distress  MS: paper test of the  left hand (ulnar test) was positive along the left. Finger tot thumb was weak. Normal radial pulse.   PSYCH: mentation appears normal, affect normal/bright                  .  ..

## 2022-07-15 ENCOUNTER — MYC REFILL (OUTPATIENT)
Dept: FAMILY MEDICINE | Facility: CLINIC | Age: 62
End: 2022-07-15

## 2022-07-15 DIAGNOSIS — F98.8 ATTENTION DEFICIT DISORDER (ADD) WITHOUT HYPERACTIVITY: ICD-10-CM

## 2022-07-15 RX ORDER — DEXTROAMPHETAMINE SACCHARATE, AMPHETAMINE ASPARTATE, DEXTROAMPHETAMINE SULFATE AND AMPHETAMINE SULFATE 5; 5; 5; 5 MG/1; MG/1; MG/1; MG/1
20 TABLET ORAL 2 TIMES DAILY
Qty: 60 TABLET | Refills: 0 | Status: CANCELLED | OUTPATIENT
Start: 2022-07-15

## 2022-07-15 RX ORDER — DEXTROAMPHETAMINE SACCHARATE, AMPHETAMINE ASPARTATE, DEXTROAMPHETAMINE SULFATE AND AMPHETAMINE SULFATE 5; 5; 5; 5 MG/1; MG/1; MG/1; MG/1
20 TABLET ORAL 2 TIMES DAILY
Qty: 60 TABLET | Refills: 0 | Status: SHIPPED | OUTPATIENT
Start: 2022-07-15 | End: 2022-08-15

## 2022-07-15 RX ORDER — DEXTROAMPHETAMINE SACCHARATE, AMPHETAMINE ASPARTATE, DEXTROAMPHETAMINE SULFATE AND AMPHETAMINE SULFATE 5; 5; 5; 5 MG/1; MG/1; MG/1; MG/1
20 TABLET ORAL 2 TIMES DAILY
Qty: 60 TABLET | Refills: 0 | Status: SHIPPED | OUTPATIENT
Start: 2022-08-15 | End: 2022-09-14

## 2022-07-15 RX ORDER — DEXTROAMPHETAMINE SACCHARATE, AMPHETAMINE ASPARTATE, DEXTROAMPHETAMINE SULFATE AND AMPHETAMINE SULFATE 5; 5; 5; 5 MG/1; MG/1; MG/1; MG/1
20 TABLET ORAL 2 TIMES DAILY
Qty: 60 TABLET | Refills: 0 | Status: SHIPPED | OUTPATIENT
Start: 2022-09-15 | End: 2022-10-15

## 2022-08-03 ENCOUNTER — OFFICE VISIT (OUTPATIENT)
Dept: AUDIOLOGY | Facility: CLINIC | Age: 62
End: 2022-08-03
Payer: COMMERCIAL

## 2022-08-03 DIAGNOSIS — H81.10 BENIGN PAROXYSMAL POSITIONAL VERTIGO, UNSPECIFIED LATERALITY: ICD-10-CM

## 2022-08-03 DIAGNOSIS — R42 DIZZINESS: Primary | ICD-10-CM

## 2022-08-03 PROCEDURE — 92557 COMPREHENSIVE HEARING TEST: CPT | Performed by: AUDIOLOGIST

## 2022-08-03 PROCEDURE — 92550 TYMPANOMETRY & REFLEX THRESH: CPT | Performed by: AUDIOLOGIST

## 2022-08-03 NOTE — PROGRESS NOTES
AUDIOLOGY REPORT    SUMMARY: Audiology visit completed. See audiogram for results.    RECOMMENDATIONS: Follow-up with ENT.    Jose Kessler  Doctor of Audiology  [place] License # [id number]

## 2022-08-09 ENCOUNTER — HOSPITAL ENCOUNTER (OUTPATIENT)
Dept: PHYSICAL THERAPY | Facility: CLINIC | Age: 62
Setting detail: THERAPIES SERIES
Discharge: HOME | End: 2022-08-09
Attending: OTOLARYNGOLOGY
Payer: COMMERCIAL

## 2022-08-09 PROCEDURE — 97112 NEUROMUSCULAR REEDUCATION: CPT | Mod: GP | Performed by: PHYSICAL THERAPIST

## 2022-08-15 ENCOUNTER — MYC REFILL (OUTPATIENT)
Dept: FAMILY MEDICINE | Facility: CLINIC | Age: 62
End: 2022-08-15

## 2022-08-15 ENCOUNTER — NURSE TRIAGE (OUTPATIENT)
Dept: FAMILY MEDICINE | Facility: CLINIC | Age: 62
End: 2022-08-15

## 2022-08-15 ENCOUNTER — OFFICE VISIT (OUTPATIENT)
Dept: URGENT CARE | Facility: URGENT CARE | Age: 62
End: 2022-08-15
Payer: COMMERCIAL

## 2022-08-15 VITALS
TEMPERATURE: 98.3 F | HEART RATE: 99 BPM | BODY MASS INDEX: 31.09 KG/M2 | SYSTOLIC BLOOD PRESSURE: 122 MMHG | OXYGEN SATURATION: 97 % | WEIGHT: 201.5 LBS | DIASTOLIC BLOOD PRESSURE: 84 MMHG

## 2022-08-15 DIAGNOSIS — N30.01 ACUTE CYSTITIS WITH HEMATURIA: Primary | ICD-10-CM

## 2022-08-15 DIAGNOSIS — F98.8 ATTENTION DEFICIT DISORDER (ADD) WITHOUT HYPERACTIVITY: ICD-10-CM

## 2022-08-15 LAB
ALBUMIN UR-MCNC: NEGATIVE MG/DL
APPEARANCE UR: CLEAR
BACTERIA #/AREA URNS HPF: ABNORMAL /HPF
BILIRUB UR QL STRIP: NEGATIVE
COLOR UR AUTO: YELLOW
GLUCOSE UR STRIP-MCNC: NEGATIVE MG/DL
HGB UR QL STRIP: ABNORMAL
KETONES UR STRIP-MCNC: ABNORMAL MG/DL
LEUKOCYTE ESTERASE UR QL STRIP: ABNORMAL
NITRATE UR QL: NEGATIVE
PH UR STRIP: 5.5 [PH] (ref 5–7)
RBC #/AREA URNS AUTO: ABNORMAL /HPF
SP GR UR STRIP: 1.02 (ref 1–1.03)
SQUAMOUS #/AREA URNS AUTO: ABNORMAL /LPF
UROBILINOGEN UR STRIP-ACNC: 0.2 E.U./DL
WBC #/AREA URNS AUTO: ABNORMAL /HPF

## 2022-08-15 PROCEDURE — 99213 OFFICE O/P EST LOW 20 MIN: CPT | Performed by: PHYSICIAN ASSISTANT

## 2022-08-15 PROCEDURE — 81001 URINALYSIS AUTO W/SCOPE: CPT | Performed by: PHYSICIAN ASSISTANT

## 2022-08-15 PROCEDURE — 87086 URINE CULTURE/COLONY COUNT: CPT | Performed by: PHYSICIAN ASSISTANT

## 2022-08-15 RX ORDER — NITROFURANTOIN 25; 75 MG/1; MG/1
100 CAPSULE ORAL 2 TIMES DAILY
Qty: 10 CAPSULE | Refills: 0 | Status: SHIPPED | OUTPATIENT
Start: 2022-08-15 | End: 2022-08-20

## 2022-08-15 RX ORDER — VITS A,C,E/LUTEIN/MINERALS 300MCG-200
1 TABLET ORAL DAILY
COMMUNITY
End: 2022-09-11

## 2022-08-15 RX ORDER — DEXTROAMPHETAMINE SACCHARATE, AMPHETAMINE ASPARTATE, DEXTROAMPHETAMINE SULFATE AND AMPHETAMINE SULFATE 5; 5; 5; 5 MG/1; MG/1; MG/1; MG/1
20 TABLET ORAL 2 TIMES DAILY
Qty: 60 TABLET | Refills: 0 | Status: SHIPPED | OUTPATIENT
Start: 2022-08-15 | End: 2022-09-09

## 2022-08-15 RX ORDER — PHENAZOPYRIDINE HYDROCHLORIDE 100 MG/1
100 TABLET, FILM COATED ORAL 3 TIMES DAILY PRN
Qty: 9 TABLET | Refills: 0 | Status: SHIPPED | OUTPATIENT
Start: 2022-08-15 | End: 2022-08-18

## 2022-08-15 NOTE — TELEPHONE ENCOUNTER
S-(situation): Patient calling regarding UTI symptoms.     B-(background): Patient notes last UTI was 10 yrs ago.     A-(assessment): Patient experiencing pelvic pressure, flank pain, urinary frequency. No fever, but patient notes she experienced chills over the weekend. Patient feels fatigued. No pain with urination. No known blood in urine. No fever. No known foul odor to urine.     R-(recommendations): Per protocol, advised visit today. Offered e-visit, patient declines, wants to be seen in person. No available appointments today at , [facility], [facility], or [facility]. RN advised [facility]. Patient verbalized understanding and agreed with plan, patient will go to  [facility] today. No further questions at this time.       Reason for Disposition    Side (flank) or lower back pain present    Additional Information    Negative: Shock suspected (e.g., cold/pale/clammy skin, too weak to stand, low BP, rapid pulse)    Negative: Sounds like a life-threatening emergency to the triager    Negative: Followed a female genital area injury (e.g., vagina, vulva)    Negative: Followed a male genital area injury (penis, scrotum)    Negative: Vaginal discharge    Negative: Pus (white, yellow) or bloody discharge from end of penis    Negative: Pain or burning with passing urine (urination) and pregnant    Negative: Pain or burning with passing urine (urination) and female    Negative: Pain or burning with passing urine (urination) and male    Negative: Pain or itching in the vulvar area    Negative: Pain in scrotum is main symptom    Negative: Blood in the urine is main symptom    Negative: Symptoms arising from use of a urinary catheter (e.g., coude, Todd)    Negative: Unable to urinate (or only a few drops) > 4 hours and bladder feels very full (e.g., palpable bladder or strong urge to urinate)    Negative: Decreased urination and drinking very little and dehydration suspected (e.g., dark urine, no urine > 12 hours, very dry mouth, very lightheaded)     "Negative: Patient sounds very sick or weak to the triager    Negative: Fever > 100.4 F  (38.0 C)    Answer Assessment - Initial Assessment Questions  1. SYMPTOM: \"What's the main symptom you're concerned about?\" (e.g., frequency, incontinence)      Urinary frequency, pressure/pain in lower pelvis, flank pain. No known blood in urine. No urinary incontinence. No known foul odor to urine. No vaginal discharge. Feels better than yesterday as there are no longer has chills.   2. ONSET: \"When did the  symptoms  start?\"      Friday.   3. PAIN: \"Is there any pain?\" If Yes, ask: \"How bad is it?\" (Scale: 1-10; mild, moderate, severe)      Pelvic pain  4. CAUSE: \"What do you think is causing the symptoms?\"      unsure  5. OTHER SYMPTOMS: \"Do you have any other symptoms?\" (e.g., fever, flank pain, blood in urine, pain with urination)      Had chills on Saturday. No fever.   6. PREGNANCY: \"Is there any chance you are pregnant?\" \"When was your last menstrual period?\"      NA    Protocols used: URINARY SYMPTOMS-A-LYLE IGNACIO RN    "

## 2022-08-15 NOTE — TELEPHONE ENCOUNTER
Routing refill request to provider for review/approval because:  Drug not on the [facility] refill protocol     Mimi Rojas RN on 8/15/2022 at 12:26 PM

## 2022-08-15 NOTE — PROGRESS NOTES
URGENT CARE VISIT:    SUBJECTIVE:    Joanne Padilla is a 62 year old female who  presents today for a possible UTI. Symptoms of dysuria, frequency, suprapubic pain and pressure, back pain and voiding in small amounts have been going on for 4 day(s). Symptoms were sudden onset and gradual onset and moderate.  Patient denies nausea, vomiting, fever and chills or vaginal discharge, vaginal odor and vaginal itching. This patient does not have a history of urinary tract infections.     PMH:   Past Medical History:   Diagnosis Date     Allergic rhinitis, cause unspecified      Anxiety state, unspecified      Atopic dermatitis 10/10/2011     Cardiac dysrhythmia, unspecified      Chronic rhinitis 10/10/2011     Colitis      Exhaustion due to excessive exertion(994.5)      Gallstones      Hypercholesterolemia      Insomnia, unspecified      Major depressive disorder, single episode, unspecified      Migraines 12/17/2012     Osteoarthritis of CMC joint of thumb      Other acne      Other malaise and fatigue      Palpitations      PONV (postoperative nausea and vomiting)      Uncomplicated asthma     Occasional     Allergies: Codeine, Dust mites, and Latex   Medications:   Current Outpatient Medications   Medication Sig Dispense Refill     albuterol (PROAIR HFA/PROVENTIL HFA/VENTOLIN HFA) 108 (90 Base) MCG/ACT inhaler INHALE 2 PUFFS INTO THE LUNGS EVERY 6 HOURS AS NEEDED FOR SHORTNESS OF BREATH / DYSPNEA OR WHEEZING 8.5 g 1     amphetamine-dextroamphetamine (ADDERALL) 20 MG tablet Take 1 tablet (20 mg) by mouth 2 times daily 60 tablet 0     amphetamine-dextroamphetamine (ADDERALL) 20 MG tablet Take 1 tablet (20 mg) by mouth 2 times daily for 30 days 60 tablet 0     [START ON 9/15/2022] amphetamine-dextroamphetamine (ADDERALL) 20 MG tablet Take 1 tablet (20 mg) by mouth 2 times daily for 30 days 60 tablet 0     amphetamine-dextroamphetamine (ADDERALL) 20 MG tablet TAKE ONE TABLET BY MOUTH TWICE A DAY 60 tablet 0      cholecalciferol 25 MCG (1000 UT) TABS Take 1,000 Units by mouth       Multiple Vitamins-Minerals (MULTIVITAMIN ADULT PO) Take by mouth daily       multivitamin (OCUVITE) TABS tablet Take 1 tablet by mouth daily       buPROPion (WELLBUTRIN SR) 150 MG 12 hr tablet Take 1 tablet (150 mg) by mouth 2 times daily (Patient not taking: Reported on 8/15/2022) 180 tablet 0     Cyanocobalamin (VITAMIN B12 PO)  (Patient not taking: Reported on 8/15/2022)       naproxen (NAPROSYN) 500 MG tablet Take 1 tablet (500 mg) by mouth 2 times daily (with meals) (Patient not taking: Reported on 8/15/2022) 20 tablet 0     Social History:   Social History     Tobacco Use     Smoking status: Current Every Day Smoker     Packs/day: 0.50     Years: 15.00     Pack years: 7.50     Types: Cigarettes     Smokeless tobacco: Former User     Quit date: 10/1/2021     Tobacco comment: has her chantix   Substance Use Topics     Alcohol use: No       ROS:  Review of systems negative except as stated above.    OBJECTIVE:  /84   Pulse 99   Temp 98.3  F (36.8  C)   Wt 91.4 kg (201 lb 8 oz)   LMP  (LMP Unknown)   SpO2 97%   BMI 31.09 kg/m    GENERAL APPEARANCE: healthy, alert and no distress  RESP: lungs clear to auscultation - no rales, rhonchi or wheezes  CV: regular rates and rhythm, normal S1 S2, no murmur noted  ABDOMEN:  soft, nontender, no HSM or masses and bowel sounds normal  BACK: slight CVA tenderness left  SKIN: no suspicious lesions or rashes    Labs:    Results for orders placed or performed in visit on 08/15/22   UA macro with reflex to Microscopic and Culture - Clinc Collect     Status: Abnormal    Specimen: Urine, Clean Catch   Result Value Ref Range    Color Urine Yellow Colorless, Straw, Light Yellow, Yellow    Appearance Urine Clear Clear    Glucose Urine Negative Negative mg/dL    Bilirubin Urine Negative Negative    Ketones Urine Trace (A) Negative mg/dL    Specific Gravity Urine 1.020 1.003 - 1.035    Blood Urine Moderate  (A) Negative    pH Urine 5.5 5.0 - 7.0    Protein Albumin Urine Negative Negative mg/dL    Urobilinogen Urine 0.2 0.2, 1.0 E.U./dL    Nitrite Urine Negative Negative    Leukocyte Esterase Urine Small (A) Negative   Urine Microscopic     Status: Abnormal   Result Value Ref Range    Bacteria Urine Few (A) None Seen /HPF    RBC Urine 2-5 (A) 0-2 /HPF /HPF    WBC Urine 5-10 (A) 0-5 /HPF /HPF    Squamous Epithelials Urine Few (A) None Seen /LPF    Narrative    Urine Culture not indicated       ASSESSMENT:     ICD-10-CM    1. Acute cystitis with hematuria  N30.01 phenazopyridine (PYRIDIUM) 100 MG tablet     nitroFURantoin macrocrystal-monohydrate (MACROBID) 100 MG capsule       PLAN:  See patient instructions  Diagnosis and treatment plan were discussed with patient and/or parent. If symptoms worsen or do not improve in the next few days, follow-up with your primary care provider or visit an [facility] urgent care clinic location.  Patient verbalizes understanding of all things discussed. All questions were addressed and answered.   The patient was discharged ambulatory and in stable condition.    Lexi Caruso PA-C on 8/15/2022 at 2:09 PM

## 2022-08-17 LAB — BACTERIA UR CULT: NORMAL

## 2022-08-23 ENCOUNTER — OFFICE VISIT (OUTPATIENT)
Dept: FAMILY MEDICINE | Facility: CLINIC | Age: 62
End: 2022-08-23
Payer: COMMERCIAL

## 2022-08-23 VITALS
DIASTOLIC BLOOD PRESSURE: 68 MMHG | RESPIRATION RATE: 18 BRPM | WEIGHT: 200 LBS | HEART RATE: 98 BPM | SYSTOLIC BLOOD PRESSURE: 110 MMHG | BODY MASS INDEX: 30.86 KG/M2 | OXYGEN SATURATION: 96 % | TEMPERATURE: 98.5 F

## 2022-08-23 DIAGNOSIS — R39.9 URINARY SYMPTOM OR SIGN: Primary | ICD-10-CM

## 2022-08-23 DIAGNOSIS — G56.22 ULNAR NEUROPATHY OF LEFT UPPER EXTREMITY: ICD-10-CM

## 2022-08-23 DIAGNOSIS — R31.29 OTHER MICROSCOPIC HEMATURIA: ICD-10-CM

## 2022-08-23 LAB
ALBUMIN UR-MCNC: NEGATIVE MG/DL
APPEARANCE UR: CLEAR
BACTERIA #/AREA URNS HPF: ABNORMAL /HPF
BILIRUB UR QL STRIP: NEGATIVE
CLUE CELLS: ABNORMAL
COLOR UR AUTO: YELLOW
GLUCOSE UR STRIP-MCNC: NEGATIVE MG/DL
HGB UR QL STRIP: ABNORMAL
KETONES UR STRIP-MCNC: NEGATIVE MG/DL
LEUKOCYTE ESTERASE UR QL STRIP: ABNORMAL
MUCOUS THREADS #/AREA URNS LPF: PRESENT /LPF
NITRATE UR QL: NEGATIVE
PH UR STRIP: 6 [PH] (ref 5–7)
RBC #/AREA URNS AUTO: ABNORMAL /HPF
SP GR UR STRIP: 1.02 (ref 1–1.03)
SQUAMOUS #/AREA URNS AUTO: ABNORMAL /LPF
TRICHOMONAS, WET PREP: ABNORMAL
UROBILINOGEN UR STRIP-ACNC: 0.2 E.U./DL
WBC #/AREA URNS AUTO: ABNORMAL /HPF
WBC'S/HIGH POWER FIELD, WET PREP: ABNORMAL
YEAST, WET PREP: ABNORMAL

## 2022-08-23 PROCEDURE — 81001 URINALYSIS AUTO W/SCOPE: CPT | Performed by: PHYSICIAN ASSISTANT

## 2022-08-23 PROCEDURE — 87210 SMEAR WET MOUNT SALINE/INK: CPT | Performed by: PHYSICIAN ASSISTANT

## 2022-08-23 PROCEDURE — 99214 OFFICE O/P EST MOD 30 MIN: CPT | Performed by: PHYSICIAN ASSISTANT

## 2022-08-23 ASSESSMENT — ASTHMA QUESTIONNAIRES
ACT_TOTALSCORE: 20
QUESTION_2 LAST FOUR WEEKS HOW OFTEN HAVE YOU HAD SHORTNESS OF BREATH: ONCE OR TWICE A WEEK
QUESTION_3 LAST FOUR WEEKS HOW OFTEN DID YOUR ASTHMA SYMPTOMS (WHEEZING, COUGHING, SHORTNESS OF BREATH, CHEST TIGHTNESS OR PAIN) WAKE YOU UP AT NIGHT OR EARLIER THAN USUAL IN THE MORNING: NOT AT ALL
ACT_TOTALSCORE: 20
QUESTION_1 LAST FOUR WEEKS HOW MUCH OF THE TIME DID YOUR ASTHMA KEEP YOU FROM GETTING AS MUCH DONE AT WORK, SCHOOL OR AT HOME: NONE OF THE TIME
QUESTION_4 LAST FOUR WEEKS HOW OFTEN HAVE YOU USED YOUR RESCUE INHALER OR NEBULIZER MEDICATION (SUCH AS ALBUTEROL): TWO OR THREE TIMES PER WEEK
QUESTION_5 LAST FOUR WEEKS HOW WOULD YOU RATE YOUR ASTHMA CONTROL: SOMEWHAT CONTROLLED

## 2022-08-23 ASSESSMENT — PATIENT HEALTH QUESTIONNAIRE - PHQ9
SUM OF ALL RESPONSES TO PHQ QUESTIONS 1-9: 6
SUM OF ALL RESPONSES TO PHQ QUESTIONS 1-9: 6
10. IF YOU CHECKED OFF ANY PROBLEMS, HOW DIFFICULT HAVE THESE PROBLEMS MADE IT FOR YOU TO DO YOUR WORK, TAKE CARE OF THINGS AT HOME, OR GET ALONG WITH OTHER PEOPLE: VERY DIFFICULT

## 2022-08-23 ASSESSMENT — PAIN SCALES - GENERAL: PAINLEVEL: NO PAIN (0)

## 2022-08-23 NOTE — PROGRESS NOTES
"  Assessment & Plan     Urinary symptom or sign  Other microscopic hematuria  Recent UA at  did not grow out any overt infection but there are still some symptoms and hematuria. Hematuria is noted on other urine tests as well. Recommend starting below. There is a hx of smoking  - UA Macro with Reflex to Micro and Culture - lab collect  - Wet prep - lab collect  - Urine Microscopic  - CT Urogram wo & w Contrast; Future    Ulnar neuropathy   Symptoms persist; recommend ortho consult       BMI:   Estimated body mass index is 30.86 kg/m  as calculated from the following:    Height as of 6/24/22: 1.715 m (5' 7.5\").    Weight as of this encounter: 90.7 kg (200 lb).       Return in about 6 months (around 2/23/2023).    [facility] SHALONDA    Adam WRIGHT is a 62 year old, presenting for the following health issues:  UTI      History of Present Illness       Reason for visit:  Diagnosed with UTI on 8-15-22, still experiencing symptomsShe consumes 0 sweetened beverage(s) daily.She exercises with enough effort to increase her heart rate 20 to 29 minutes per day.  She exercises with enough effort to increase her heart rate 4 days per week.   She is taking medications regularly.    Today's PHQ-9         PHQ-9 Total Score: 6    PHQ-9 Q9 Thoughts of better off dead/self-harm past 2 weeks :   Not at all    How difficult have these problems made it for you to do your work, take care of things at home, or get along with other people: Very difficult     Joanne Padilla is a 62 year old female who presents today for follow up after UTI dx 8/15  She mentioned the onset of urinary frequency/pain and incomplete voiding  Some low abdomen pressure as well that also felt in the low back  Seen at  with ua suggestive of infection but culture negative  Started on abx (macrobid) and has not noted a lot of improvement  She denies any further urinary pain but still with frequency  Low " abd pressure has also improved  Denies any chris vaginal discharge or external irritation  She is s/p menstruation for almost 3 years    Review of Systems   Constitutional, HEENT, cardiovascular, pulmonary, gi and gu systems are negative, except as otherwise noted.      Objective    /68 (BP Location: Right arm, Patient Position: Sitting, Cuff Size: Adult Large)   Pulse 98   Temp 98.5  F (36.9  C) (Oral)   Resp 18   Wt 90.7 kg (200 lb)   LMP  (LMP Unknown)   SpO2 96%   BMI 30.86 kg/m    Body mass index is 30.86 kg/m .  Physical Exam   GENERAL: healthy, alert and no distress  RESP: lungs clear to auscultation - no rales, rhonchi or wheezes  CV: regular rate and rhythm, normal S1 S2, no S3 or S4, no murmur, click or rub, no peripheral edema and peripheral pulses strong  ABDOMEN: tenderness low abdominal ttp and bowel sounds normal   (female): deferred  BACK: no CVA tenderness    Results for orders placed or performed in visit on 08/23/22 (from the past 24 hour(s))   UA Macro with Reflex to Micro and Culture - lab collect    Specimen: Urine, Midstream   Result Value Ref Range    Color Urine Yellow Colorless, Straw, Light Yellow, Yellow    Appearance Urine Clear Clear    Glucose Urine Negative Negative mg/dL    Bilirubin Urine Negative Negative    Ketones Urine Negative Negative mg/dL    Specific Gravity Urine 1.020 1.003 - 1.035    Blood Urine Small (A) Negative    pH Urine 6.0 5.0 - 7.0    Protein Albumin Urine Negative Negative mg/dL    Urobilinogen Urine 0.2 0.2, 1.0 E.U./dL    Nitrite Urine Negative Negative    Leukocyte Esterase Urine Trace (A) Negative   Wet prep - lab collect    Specimen: Vagina; Swab   Result Value Ref Range    Trichomonas Absent Absent    Yeast Absent Absent    Clue Cells Absent Absent    WBCs/high power field 1+ (A) None   Urine Microscopic   Result Value Ref Range    Bacteria Urine Few (A) None Seen /HPF    RBC Urine 2-5 (A) 0-2 /HPF /HPF    WBC Urine 0-5 0-5 /HPF /HPF     Squamous Epithelials Urine Moderate (A) None Seen /LPF    Mucus Urine Present (A) None Seen /LPF    Narrative    Urine Culture not indicated                   .  ..

## 2022-08-24 ENCOUNTER — HOSPITAL ENCOUNTER (OUTPATIENT)
Dept: PHYSICAL THERAPY | Facility: CLINIC | Age: 62
Setting detail: THERAPIES SERIES
Discharge: HOME | End: 2022-08-24
Attending: OTOLARYNGOLOGY
Payer: COMMERCIAL

## 2022-08-24 PROCEDURE — 97112 NEUROMUSCULAR REEDUCATION: CPT | Mod: GP | Performed by: PHYSICAL THERAPIST

## 2022-08-26 ENCOUNTER — HOSPITAL ENCOUNTER (OUTPATIENT)
Dept: CT IMAGING | Facility: CLINIC | Age: 62
Discharge: HOME | End: 2022-08-26
Attending: PHYSICIAN ASSISTANT | Admitting: PHYSICIAN ASSISTANT
Payer: COMMERCIAL

## 2022-08-26 DIAGNOSIS — R39.9 URINARY SYMPTOM OR SIGN: ICD-10-CM

## 2022-08-26 DIAGNOSIS — R31.29 OTHER MICROSCOPIC HEMATURIA: ICD-10-CM

## 2022-08-26 PROCEDURE — 250N000011 HC RX IP 250 OP 636: Performed by: PHYSICIAN ASSISTANT

## 2022-08-26 PROCEDURE — 250N000009 HC RX 250: Performed by: PHYSICIAN ASSISTANT

## 2022-08-26 PROCEDURE — 74178 CT ABD&PLV WO CNTR FLWD CNTR: CPT

## 2022-08-26 RX ORDER — IOPAMIDOL 755 MG/ML
120 INJECTION, SOLUTION INTRAVASCULAR ONCE
Status: COMPLETED | OUTPATIENT
Start: 2022-08-26 | End: 2022-08-26

## 2022-08-26 RX ADMIN — SODIUM CHLORIDE 100 ML: 9 INJECTION, SOLUTION INTRAVENOUS at 11:19

## 2022-08-26 RX ADMIN — IOPAMIDOL 120 ML: 755 INJECTION, SOLUTION INTRAVENOUS at 11:19

## 2022-08-30 ENCOUNTER — HOSPITAL ENCOUNTER (OUTPATIENT)
Dept: PHYSICAL THERAPY | Facility: CLINIC | Age: 62
Setting detail: THERAPIES SERIES
Discharge: HOME | End: 2022-08-30
Attending: OTOLARYNGOLOGY
Payer: COMMERCIAL

## 2022-08-30 PROCEDURE — 97112 NEUROMUSCULAR REEDUCATION: CPT | Mod: GP | Performed by: PHYSICAL THERAPIST

## 2022-09-08 ENCOUNTER — HOSPITAL ENCOUNTER (OUTPATIENT)
Dept: PHYSICAL THERAPY | Facility: CLINIC | Age: 62
Setting detail: THERAPIES SERIES
Discharge: HOME | End: 2022-09-08
Attending: PHYSICIAN ASSISTANT
Payer: COMMERCIAL

## 2022-09-08 PROCEDURE — 97112 NEUROMUSCULAR REEDUCATION: CPT | Mod: GP | Performed by: PHYSICAL THERAPIST

## 2022-09-09 ENCOUNTER — MYC REFILL (OUTPATIENT)
Dept: FAMILY MEDICINE | Facility: CLINIC | Age: 62
End: 2022-09-09

## 2022-09-09 DIAGNOSIS — F98.8 ATTENTION DEFICIT DISORDER (ADD) WITHOUT HYPERACTIVITY: ICD-10-CM

## 2022-09-11 ENCOUNTER — OFFICE VISIT (OUTPATIENT)
Dept: URGENT CARE | Facility: URGENT CARE | Age: 62
End: 2022-09-11
Payer: COMMERCIAL

## 2022-09-11 ENCOUNTER — APPOINTMENT (OUTPATIENT)
Dept: CT IMAGING | Facility: CLINIC | Age: 62
End: 2022-09-11
Attending: EMERGENCY MEDICINE
Payer: COMMERCIAL

## 2022-09-11 ENCOUNTER — HOSPITAL ENCOUNTER (EMERGENCY)
Facility: CLINIC | Age: 62
Discharge: HOME | End: 2022-09-11
Attending: EMERGENCY MEDICINE | Admitting: EMERGENCY MEDICINE
Payer: COMMERCIAL

## 2022-09-11 VITALS
OXYGEN SATURATION: 100 % | DIASTOLIC BLOOD PRESSURE: 80 MMHG | SYSTOLIC BLOOD PRESSURE: 112 MMHG | TEMPERATURE: 97.6 F | HEART RATE: 102 BPM

## 2022-09-11 VITALS
RESPIRATION RATE: 16 BRPM | SYSTOLIC BLOOD PRESSURE: 122 MMHG | OXYGEN SATURATION: 99 % | TEMPERATURE: 97.2 F | DIASTOLIC BLOOD PRESSURE: 83 MMHG | HEART RATE: 85 BPM

## 2022-09-11 DIAGNOSIS — R10.32 ABDOMINAL PAIN, LEFT LOWER QUADRANT: Primary | ICD-10-CM

## 2022-09-11 DIAGNOSIS — R91.8 PULMONARY NODULES: ICD-10-CM

## 2022-09-11 DIAGNOSIS — K57.32 DIVERTICULITIS OF COLON: ICD-10-CM

## 2022-09-11 LAB
ALBUMIN SERPL BCG-MCNC: 3.8 G/DL (ref 3.5–5.2)
ALBUMIN UR-MCNC: NEGATIVE MG/DL
ALP SERPL-CCNC: 74 U/L (ref 35–104)
ALT SERPL W P-5'-P-CCNC: 15 U/L (ref 10–35)
ANION GAP SERPL CALCULATED.3IONS-SCNC: 12 MMOL/L (ref 7–15)
APPEARANCE UR: CLEAR
AST SERPL W P-5'-P-CCNC: 17 U/L (ref 10–35)
BACTERIA #/AREA URNS HPF: ABNORMAL /HPF
BASOPHILS # BLD AUTO: 0 10E3/UL (ref 0–0.2)
BASOPHILS NFR BLD AUTO: 1 %
BILIRUB SERPL-MCNC: 0.5 MG/DL
BILIRUB UR QL STRIP: NEGATIVE
BUN SERPL-MCNC: 14 MG/DL (ref 8–23)
CALCIUM SERPL-MCNC: 9.2 MG/DL (ref 8.8–10.2)
CHLORIDE SERPL-SCNC: 102 MMOL/L (ref 98–107)
COLOR UR AUTO: YELLOW
CREAT SERPL-MCNC: 0.65 MG/DL (ref 0.51–0.95)
DEPRECATED HCO3 PLAS-SCNC: 22 MMOL/L (ref 22–29)
EOSINOPHIL # BLD AUTO: 0.2 10E3/UL (ref 0–0.7)
EOSINOPHIL NFR BLD AUTO: 3 %
ERYTHROCYTE [DISTWIDTH] IN BLOOD BY AUTOMATED COUNT: 13.7 % (ref 10–15)
GFR SERPL CREATININE-BSD FRML MDRD: >90 ML/MIN/1.73M2
GLUCOSE SERPL-MCNC: 83 MG/DL (ref 70–99)
GLUCOSE UR STRIP-MCNC: NEGATIVE MG/DL
HCT VFR BLD AUTO: 44.7 % (ref 35–47)
HGB BLD-MCNC: 14.7 G/DL (ref 11.7–15.7)
HGB UR QL STRIP: ABNORMAL
HYALINE CASTS: 11 /LPF
IMM GRANULOCYTES # BLD: 0 10E3/UL
IMM GRANULOCYTES NFR BLD: 0 %
KETONES UR STRIP-MCNC: 20 MG/DL
LEUKOCYTE ESTERASE UR QL STRIP: ABNORMAL
LIPASE SERPL-CCNC: 35 U/L (ref 13–60)
LYMPHOCYTES # BLD AUTO: 1.7 10E3/UL (ref 0.8–5.3)
LYMPHOCYTES NFR BLD AUTO: 30 %
MCH RBC QN AUTO: 30.8 PG (ref 26.5–33)
MCHC RBC AUTO-ENTMCNC: 32.9 G/DL (ref 31.5–36.5)
MCV RBC AUTO: 94 FL (ref 78–100)
MONOCYTES # BLD AUTO: 0.4 10E3/UL (ref 0–1.3)
MONOCYTES NFR BLD AUTO: 7 %
MUCOUS THREADS #/AREA URNS LPF: PRESENT /LPF
NEUTROPHILS # BLD AUTO: 3.3 10E3/UL (ref 1.6–8.3)
NEUTROPHILS NFR BLD AUTO: 59 %
NITRATE UR QL: NEGATIVE
NRBC # BLD AUTO: 0 10E3/UL
NRBC BLD AUTO-RTO: 0 /100
PH UR STRIP: 5.5 [PH] (ref 5–7)
PLATELET # BLD AUTO: 283 10E3/UL (ref 150–450)
POTASSIUM SERPL-SCNC: 4.5 MMOL/L (ref 3.4–5.3)
PROT SERPL-MCNC: 7 G/DL (ref 6.4–8.3)
RBC # BLD AUTO: 4.78 10E6/UL (ref 3.8–5.2)
RBC URINE: 6 /HPF
SODIUM SERPL-SCNC: 136 MMOL/L (ref 136–145)
SP GR UR STRIP: 1.02 (ref 1–1.03)
SQUAMOUS EPITHELIAL: 3 /HPF
UROBILINOGEN UR STRIP-MCNC: NORMAL MG/DL
WBC # BLD AUTO: 5.5 10E3/UL (ref 4–11)
WBC URINE: 18 /HPF

## 2022-09-11 PROCEDURE — 99214 OFFICE O/P EST MOD 30 MIN: CPT | Performed by: PREVENTIVE MEDICINE

## 2022-09-11 PROCEDURE — 250N000013 HC RX MED GY IP 250 OP 250 PS 637: Performed by: EMERGENCY MEDICINE

## 2022-09-11 PROCEDURE — 80053 COMPREHEN METABOLIC PANEL: CPT | Performed by: EMERGENCY MEDICINE

## 2022-09-11 PROCEDURE — 36415 COLL VENOUS BLD VENIPUNCTURE: CPT | Performed by: EMERGENCY MEDICINE

## 2022-09-11 PROCEDURE — 74177 CT ABD & PELVIS W/CONTRAST: CPT

## 2022-09-11 PROCEDURE — 99285 EMERGENCY DEPT VISIT HI MDM: CPT | Mod: 25

## 2022-09-11 PROCEDURE — 85025 COMPLETE CBC W/AUTO DIFF WBC: CPT | Performed by: EMERGENCY MEDICINE

## 2022-09-11 PROCEDURE — 83690 ASSAY OF LIPASE: CPT | Performed by: EMERGENCY MEDICINE

## 2022-09-11 PROCEDURE — 81001 URINALYSIS AUTO W/SCOPE: CPT | Performed by: EMERGENCY MEDICINE

## 2022-09-11 PROCEDURE — 250N000011 HC RX IP 250 OP 636: Performed by: EMERGENCY MEDICINE

## 2022-09-11 PROCEDURE — 82040 ASSAY OF SERUM ALBUMIN: CPT | Performed by: EMERGENCY MEDICINE

## 2022-09-11 PROCEDURE — 250N000009 HC RX 250: Performed by: EMERGENCY MEDICINE

## 2022-09-11 RX ORDER — IOPAMIDOL 755 MG/ML
500 INJECTION, SOLUTION INTRAVASCULAR ONCE
Status: COMPLETED | OUTPATIENT
Start: 2022-09-11 | End: 2022-09-11

## 2022-09-11 RX ORDER — PHENAZOPYRIDINE HYDROCHLORIDE 200 MG/1
TABLET, FILM COATED ORAL
COMMUNITY
Start: 2022-08-15 | End: 2022-12-08

## 2022-09-11 RX ADMIN — IOPAMIDOL 100 ML: 755 INJECTION, SOLUTION INTRAVENOUS at 16:26

## 2022-09-11 RX ADMIN — AMOXICILLIN AND CLAVULANATE POTASSIUM 1 TABLET: 875; 125 TABLET, FILM COATED ORAL at 18:20

## 2022-09-11 RX ADMIN — SODIUM CHLORIDE 65 ML: 9 INJECTION, SOLUTION INTRAVENOUS at 16:26

## 2022-09-11 ASSESSMENT — ENCOUNTER SYMPTOMS
VOMITING: 0
CHILLS: 1
DIARRHEA: 0
ABDOMINAL PAIN: 1
FEVER: 0

## 2022-09-11 ASSESSMENT — ACTIVITIES OF DAILY LIVING (ADL): ADLS_ACUITY_SCORE: 35

## 2022-09-11 ASSESSMENT — PAIN SCALES - GENERAL: PAINLEVEL: MODERATE PAIN (5)

## 2022-09-11 NOTE — PROGRESS NOTES
Assessment & Plan     (R10.32) Abdominal pain, left lower quadrant  (primary encounter diagnosis)  With peritoneal signs, needs ct imaging, referred to ED now.    31 minutes spent on the date of the encounter doing chart review, review of outside records, patient visit, documentation and discussion with other provider(s)         No follow-ups on file.    Ty Hogan MD  [facility]    Subjective     Joanne Padilla is a 62 year old year old female who presents to clinic today for the following health issues:    Patient presents with:  Urgent Care  Abdominal Pain: Started on Friday night and woke up Saturday morning with pain all over her abdominal area. LUQ abdominal pain that feels like she pulled a muscle. Pt has been sleeping in the last few days feeling nausea    This is a 61 yo female with 2 days of abdominal pain.  Some nausea but no vomiting.  Has not felt like eating.  Nl bms.  No pain with urination.  No blood in stool or urine.  No fever or chills.  Hurts when she goes over bumps in the car.  Hx cholecystectomy, c section.    Patient Active Problem List   Diagnosis     Chronic rhinitis     Atopic dermatitis     Migraines     Allergic rhinitis     Exhaustion due to excessive exertion(994.5)       Current Outpatient Medications   Medication     albuterol (PROAIR HFA/PROVENTIL HFA/VENTOLIN HFA) 108 (90 Base) MCG/ACT inhaler     amphetamine-dextroamphetamine (ADDERALL) 20 MG tablet     amphetamine-dextroamphetamine (ADDERALL) 20 MG tablet     [START ON 9/15/2022] amphetamine-dextroamphetamine (ADDERALL) 20 MG tablet     amphetamine-dextroamphetamine (ADDERALL) 20 MG tablet     cholecalciferol 25 MCG (1000 UT) TABS     Cyanocobalamin (VITAMIN B12 PO)     Multiple Vitamins-Minerals (MULTIVITAMIN ADULT PO)     phenazopyridine (PYRIDIUM) 200 MG tablet     No current facility-administered medications for this visit.       Past Medical History:   Diagnosis Date      Allergic rhinitis, cause unspecified      Anxiety state, unspecified      Atopic dermatitis 10/10/2011     Cardiac dysrhythmia, unspecified      Chronic rhinitis 10/10/2011     Colitis      Exhaustion due to excessive exertion(994.5)      Gallstones      Hypercholesterolemia      Insomnia, unspecified      Major depressive disorder, single episode, unspecified      Migraines 2012     Osteoarthritis of CMC joint of thumb      Other acne      Other malaise and fatigue      Palpitations      PONV (postoperative nausea and vomiting)      Uncomplicated asthma     Occasional       Social History   reports that she has been smoking cigarettes. She has a 7.50 pack-year smoking history. She quit smokeless tobacco use about a year ago. She reports that she does not drink alcohol and does not use drugs.    Family History   Problem Relation Age of Onset     C.A.D. Mother         MI early 50s;  59 heart problems     Breast Cancer Mother         Cancer Unknown Primary     Other Cancer Mother      Anesthesia Reaction Mother      C.A.D. Father      Diabetes Father      Breast Cancer Maternal Grandmother         Cancer Unknown Primary     Anxiety Disorder Sister      Thyroid Disease Paternal Grandmother        Review of Systems  Constitutional, HEENT, cardiovascular, pulmonary, GI, , musculoskeletal, neuro, skin, endocrine and psych systems are negative, except as otherwise noted.      Objective    /80   Pulse 102   Temp 97.6  F (36.4  C) (Tympanic)   LMP  (LMP Unknown)   SpO2 100%   Physical Exam   GENERAL: healthy, alert and no distress  EYES: Eyes grossly normal to inspection, PERRL and conjunctivae and sclerae normal  HENT: ear canals and TM's normal, nose and mouth without ulcers or lesions  NECK: no adenopathy, no asymmetry, masses, or scars and thyroid normal to palpation  RESP: lungs clear to auscultation - no rales, rhonchi or wheezes  CV: regular rate and rhythm, normal S1 S2, no S3 or S4, no murmur,  click or rub, no peripheral edema and peripheral pulses strong  ABDOMEN: soft, ttp mid abdomen and llq, +rebound llq, no guarding, no hepatosplenomegaly, no masses and bowel sounds normal  MS: no gross musculoskeletal defects noted, no edema  SKIN: no suspicious lesions or rashes  NEURO: Normal strength and tone, mentation intact and speech normal  PSYCH: mentation appears normal, affect normal/bright

## 2022-09-11 NOTE — ED PROVIDER NOTES
History   Chief Complaint:  No chief complaint on file.       HPI   Joanne Padilla is a 62 year old female who presents with left upper and mid abdominal pain that worsened over the course of 2 days. 2 days ago she felt fatigued and was bloated. The next morning she felt extreme pain across her whole abdomin. It felt like a big tight belt over her abdomin. She was not able to straighten her back. She also developed chills. This morning the pain was better but still has some on her left and mid abdomin. She has not been drinking liquids the past 3 days as she has been sleeping most of the day. She has not eaten for 2 days. She was seen on August 15th by  due to hematuria, urinary frequency, and some abdominal pain. Her UA was contaminated. She was given antibiotics due to her urinary symptoms.The symptoms did not get better and she went back to  on August 26th. The provider thought that she did not have a UTI and performed a CT scan which was normal. Her pain that day is similar to her abdominal pain today but today her pain was more severe. She denies fever, vomiting, or diarrhea.     CT Urogram wo and w contrast (8/26/22)  1. No evidence for renal, ureteral, or bladder calculi.  2. No masses or suspicious filling defects within the opacified  urinary collecting system.     Reading per radiology.    Review of Systems   Constitutional: Positive for chills. Negative for fever.   Gastrointestinal: Positive for abdominal pain. Negative for diarrhea and vomiting.   All other systems reviewed and are negative.    Allergies:  Codeine  Dust Mites  Latex    Medications:  Albuterol  Adderall  Pyridium    Past Medical History:     Atopic dermatitis    Past Surgical History:    Biopsy  C section  Colonoscopy  DaVinci laparoscopic cholecystectomy  ENT surgery  Gyn surgery  Lumpectomy breast  Nose surgery    Family History:    CAD  Breast cancer  DM  Anxiety    Social History:  The patient presents to the ED with  son  PCP: Fermín Larios     Physical Exam     Patient Vitals for the past 24 hrs:   BP Temp Temp src Pulse Resp SpO2   09/11/22 1448 (!) 111/96 97.2  F (36.2  C) Temporal 102 16 98 %       Physical Exam  General: Patient is alert and cooperative.  HENT:  Normal nose, oropharynx. Moist oral mucosa.  Eyes: EOMI. Normal conjunctiva.  Neck:  Normal range of motion and appearance.   Cardiovascular:  Normal rate.   Pulmonary/Chest:  Effort normal.  Abdominal: Soft. No distension; mild left sided tenderness.   Musculoskeletal: Normal range of motion. No edema or tenderness.   Neurological: oriented, normal strength, sensation, and coordination.   Skin: Warm and dry. No rash or bruising.   Psychiatric: Normal mood and affect. Normal behavior and judgement.    Emergency Department Course     Imaging:  CT Abdomen Pelvis w Contrast   Final Result   IMPRESSION:    1.  Incompletely visualized possible pulmonary nodule measuring 12 x 8 mm in the lingula versus infiltrate or focal scarring. A dedicated CT of the chest is recommended for further evaluation in the near future. An additional 5 mm right lower lobe nodule    can be imaged at the same time.   2.  Acute diverticulitis involving the splenic flexure of the colon. No evidence of perforation, abscess, or obstruction.   3.  Moderate diffuse colonic diverticulosis.        Report per radiology    Laboratory:  Labs Ordered and Resulted from Time of ED Arrival to Time of ED Departure   ROUTINE UA WITH MICROSCOPIC - Abnormal       Result Value    Color Urine Yellow      Appearance Urine Clear      Glucose Urine Negative      Bilirubin Urine Negative      Ketones Urine 20  (*)     Specific Gravity Urine 1.016      Blood Urine Small (*)     pH Urine 5.5      Protein Albumin Urine Negative      Urobilinogen Urine Normal      Nitrite Urine Negative      Leukocyte Esterase Urine Large (*)     Bacteria Urine Few (*)     Mucus Urine Present (*)     RBC Urine 6 (*)     WBC Urine  18 (*)     Squamous Epithelials Urine 3 (*)     Hyaline Casts Urine 11 (*)    COMPREHENSIVE METABOLIC PANEL - Normal    Sodium 136      Potassium 4.5      Creatinine 0.65      Urea Nitrogen 14.0      Chloride 102      Carbon Dioxide (CO2) 22      Anion Gap 12      Glucose 83      Calcium 9.2      Protein Total 7.0      Albumin 3.8      Bilirubin Total 0.5      Alkaline Phosphatase 74      AST 17      ALT 15      GFR Estimate >90     LIPASE - Normal    Lipase 35     CBC WITH PLATELETS AND DIFFERENTIAL    WBC Count 5.5      RBC Count 4.78      Hemoglobin 14.7      Hematocrit 44.7      MCV 94      MCH 30.8      MCHC 32.9      RDW 13.7      Platelet Count 283      % Neutrophils 59      % Lymphocytes 30      % Monocytes 7      % Eosinophils 3      % Basophils 1      % Immature Granulocytes 0      NRBCs per 100 WBC 0      Absolute Neutrophils 3.3      Absolute Lymphocytes 1.7      Absolute Monocytes 0.4      Absolute Eosinophils 0.2      Absolute Basophils 0.0      Absolute Immature Granulocytes 0.0      Absolute NRBCs 0.0        Emergency Department Course:     Reviewed:  I reviewed nursing notes, vitals, past medical history and Care Everywhere    Assessments:  1615 I obtained history and examined the patient as noted above.     1750 I rechecked the patient and explained findings.     1822 I rechecked the patient and explained next steps    Interventions:  1820 Augmentin 1 tab PO    Disposition:  The patient was discharged to home.     Impression & Plan     Medical Decision Making:  Joanne Padilla is a 62 year old female who presents with abdominal pain concerning for diverticulitis. CT confirms diverticulitis without abscess or perforation. Her pain has been controlled by the interventions in the emergency department. On recheck, she has a non-surgical exam, pain is controlled, and she is tolerating POs.  I discussed indications for admission versus discharge and together we opted for outpatient management.  I will  prescribe augmentin and supportive medications as per below.  We discussed the natural history of this problem, and I discussed dietary precautions. I recommended she return to the ED for worsening pain, fever, vomiting, bloody or black stools, or for any other concerning symptoms. I recommended she follow up with her primary care physician in 2-3 days.        Diagnosis:    ICD-10-CM    1. Diverticulitis of colon  K57.32    2. Pulmonary nodules  R91.8        Discharge Medications:  New Prescriptions    AMOXICILLIN-CLAVULANATE (AUGMENTIN) 875-125 MG TABLET    Take 1 tablet by mouth 2 times daily for 10 days       Scribe Disclosure:  I, Ramin Corley, am serving as a scribe at 4:05 PM on 9/11/2022 to document services personally performed by Herman Alfaro MD based on my observations and the provider's statements to me.          Herman Alfaro MD  09/11/22 1390

## 2022-09-11 NOTE — ED TRIAGE NOTES
Abdominal pain since Friday night. Pain became worse yesterday; is a little better today, but notes she has not eaten yet today. Sent here from urgent care. Pt states that initially pain radiated across her upper abdomen. Today is just left, lower, abd. ABC intact. Pt states that she had a CT scan in the last week.

## 2022-09-12 ENCOUNTER — TELEPHONE (OUTPATIENT)
Dept: FAMILY MEDICINE | Facility: CLINIC | Age: 62
End: 2022-09-12

## 2022-09-12 RX ORDER — DEXTROAMPHETAMINE SACCHARATE, AMPHETAMINE ASPARTATE, DEXTROAMPHETAMINE SULFATE AND AMPHETAMINE SULFATE 5; 5; 5; 5 MG/1; MG/1; MG/1; MG/1
20 TABLET ORAL 2 TIMES DAILY
Qty: 60 TABLET | Refills: 0 | Status: SHIPPED | OUTPATIENT
Start: 2022-09-12 | End: 2022-10-12

## 2022-09-12 NOTE — TELEPHONE ENCOUNTER
Joanne was seen in the ED on 9/11 for diverticulitis would like to get worked in for a [facility] F/U     Joanne- [phone],ok to leave detailed message.    Ashwini Sparks

## 2022-09-12 NOTE — TELEPHONE ENCOUNTER
Routing refill request to provider for review/approval because:  Drug not on the [facility] refill protocol     Mimi Rojas RN on 9/12/2022 at 10:57 AM

## 2022-09-13 ENCOUNTER — OFFICE VISIT (OUTPATIENT)
Dept: ORTHOPEDICS | Facility: CLINIC | Age: 62
End: 2022-09-13

## 2022-09-13 ENCOUNTER — ANCILLARY PROCEDURE (OUTPATIENT)
Dept: GENERAL RADIOLOGY | Facility: CLINIC | Age: 62
End: 2022-09-13
Attending: ORTHOPAEDIC SURGERY
Payer: COMMERCIAL

## 2022-09-13 VITALS — SYSTOLIC BLOOD PRESSURE: 118 MMHG | DIASTOLIC BLOOD PRESSURE: 68 MMHG

## 2022-09-13 DIAGNOSIS — R22.31 MASS OF FINGER, RIGHT: ICD-10-CM

## 2022-09-13 DIAGNOSIS — R22.31 MASS OF FINGER, RIGHT: Primary | ICD-10-CM

## 2022-09-13 DIAGNOSIS — G56.22 ULNAR NEUROPATHY OF LEFT UPPER EXTREMITY: ICD-10-CM

## 2022-09-13 PROCEDURE — 73130 X-RAY EXAM OF HAND: CPT | Mod: TC | Performed by: RADIOLOGY

## 2022-09-13 PROCEDURE — 99203 OFFICE O/P NEW LOW 30 MIN: CPT | Performed by: ORTHOPAEDIC SURGERY

## 2022-09-13 NOTE — LETTER
9/13/2022         RE: Joanne Padilla  [address] [facility] [address] [place] [id number]        Dear Colleague,    Thank you for referring your patient, Joanne Padilla, to the [facility] [place]. Please see a copy of my visit note below.    S: Patient is a 62 year old, right hand dominant female seen today in consultation for left hand numbness and mass of right long finger PIP joint.  Onset of symptoms over the last 2 months. She reports in December of 2021 she had a trip and fall causing injury to the left hand and right Patient reports initial onset of tingling in the left ring and little finger, symptoms have progressed to increased tingling and numbness of the finger.  Increased symptoms when driving and using the left extremity.  knee.  They have tried the following therapies: naproxen.  She also reports notcing a mass of the left proximal volar ring finger, she reports this mass as resolved.  She also notes mass of the right long finger located to the PIP joint.      Patient is currently working , remotely from home.            Patient Active Problem List   Diagnosis     Chronic rhinitis     Atopic dermatitis     Migraines     Allergic rhinitis     Exhaustion due to excessive exertion(994.5)            Past Medical History:   Diagnosis Date     Allergic rhinitis, cause unspecified      Anxiety state, unspecified      Atopic dermatitis 10/10/2011     Cardiac dysrhythmia, unspecified      Chronic rhinitis 10/10/2011     Colitis      Exhaustion due to excessive exertion(994.5)      Gallstones      Hypercholesterolemia      Insomnia, unspecified      Major depressive disorder, single episode, unspecified      Migraines 12/17/2012     Osteoarthritis of CMC joint of thumb      Other acne      Other malaise and fatigue      Palpitations      PONV (postoperative nausea and vomiting)      Uncomplicated asthma     Occasional            Past Surgical History:   Procedure  Laterality Date     BIOPSY      breast lumps      SECTION       COLONOSCOPY       DAVINCI LAPAROSCOPIC CHOLECYSTECTOMY WITHOUT GRAMS  2013    Procedure: DAVINCI LAPAROSCOPIC CHOLECYSTECTOMY WITHOUT GRAMS;  DAVINCI CHOLECYSTECTOMY;  Surgeon: Jac Stallings MD;  Location: [facility] OR     ENT SURGERY      deviated septum, blocked nasal passage     GYN SURGERY      repair of fallopian tubes and ovaries     LUMPECTOMY BREAST       LUMPECTOMY BREAST       NOSE SURGERY              Social History     Tobacco Use     Smoking status: Current Every Day Smoker     Packs/day: 0.50     Years: 15.00     Pack years: 7.50     Types: Cigarettes     Smokeless tobacco: Former User     Quit date: 10/1/2021     Tobacco comment: has her chantix   Substance Use Topics     Alcohol use: No            Family History   Problem Relation Age of Onset     C.A.D. Mother         MI early 50s;  59 heart problems     Breast Cancer Mother         Cancer Unknown Primary     Other Cancer Mother      Anesthesia Reaction Mother      C.A.D. Father      Diabetes Father      Breast Cancer Maternal Grandmother         Cancer Unknown Primary     Anxiety Disorder Sister      Thyroid Disease Paternal Grandmother                Allergies   Allergen Reactions     Codeine Nausea     Dust Mites      Latex             Current Outpatient Medications   Medication Sig Dispense Refill     albuterol (PROAIR HFA/PROVENTIL HFA/VENTOLIN HFA) 108 (90 Base) MCG/ACT inhaler INHALE 2 PUFFS INTO THE LUNGS EVERY 6 HOURS AS NEEDED FOR SHORTNESS OF BREATH / DYSPNEA OR WHEEZING 8.5 g 1     amoxicillin-clavulanate (AUGMENTIN) 875-125 MG tablet Take 1 tablet by mouth 2 times daily for 10 days 20 tablet 0     amphetamine-dextroamphetamine (ADDERALL) 20 MG tablet Take 1 tablet (20 mg) by mouth 2 times daily 60 tablet 0     amphetamine-dextroamphetamine (ADDERALL) 20 MG tablet Take 1 tablet (20 mg) by mouth 2 times daily for 30 days 60 tablet 0     [START ON 9/15/2022]  amphetamine-dextroamphetamine (ADDERALL) 20 MG tablet Take 1 tablet (20 mg) by mouth 2 times daily for 30 days 60 tablet 0     amphetamine-dextroamphetamine (ADDERALL) 20 MG tablet TAKE ONE TABLET BY MOUTH TWICE A DAY 60 tablet 0     cholecalciferol 25 MCG (1000 UT) TABS Take 1,000 Units by mouth       Cyanocobalamin (VITAMIN B12 PO)        Multiple Vitamins-Minerals (MULTIVITAMIN ADULT PO) Take by mouth daily       phenazopyridine (PYRIDIUM) 200 MG tablet             Review Of Systems  Skin: negative  Eyes: negative  Ears/Nose/Throat: negative  Respiratory: No shortness of breath, dyspnea on exertion, cough, or hemoptysis    O: Physical Exam: complaints of numbness and tingling both upper extremities.  No evidence thenar or hypothenar wasting.  Negative egawa and negative carpal tunnel.       Lab: update vit D/TSH/ inflammatory markers/ arthritic profile    Images:     IMPRESSION: Normal joint spacing and alignment. No fracture or joint  effusion.                                                                   IMPRESSION:  1.  Normal joint spacing and alignment.  2.  No fracture.  3.  No radiodense foreign body.     A:   Neuropathy upper extremities    P: Have discussed possible etiologies of symptoms to include cervical pathology/ tardy ulnar nerve palsy/carpal tunnel.  Would like EMG/NCV both upper extremities.  See back after studies.  Notify if exacerbation symptoms.  Obtain inflammatory markers/arthritic profile/other studies.           In addition to the above assessment and plan each active problem on Joanne's problem list was evaluated today. This included the questioning of Joanne for any medication problems. We will continue the current treatment plan for these active problems except as noted.        Again, thank you for allowing me to participate in the care of your patient.        Sincerely,        DESTINY MANUEL MD

## 2022-09-13 NOTE — PATIENT INSTRUCTIONS
Thank you for choosing [facility]    Dr. Bolaños Locations:    [facility]   [address] [facility], [address]  [place], [place] [address] [place] [address]   Appointments: [phone] Appointments: [phone]   Fax: [phone] Fax: [phone]       Follow up: 4 weeks, after completion of Lab work and EMG study of upper extremity Please call [phone] to schedule your follow up appointment.       EMG study:   [facility] [place]  Address: [address] [facility] [address], [place] [address]  Phone: [phone]    Lab orders have been placed evaluating for thyroid function, arthritic and inflammation markers. You can go to your [facility] location that has a lab. Please call your [facility] to schedule a lab appointment. [facility] has a walk-in hospital lab for your convenience.       For any questions please contact my office, [phone].

## 2022-09-13 NOTE — PROGRESS NOTES
S: Patient is a 62 year old, right hand dominant female seen today in consultation for left hand numbness and mass of right long finger PIP joint.  Onset of symptoms over the last 2 months. She reports in 2021 she had a trip and fall causing injury to the left hand and right Patient reports initial onset of tingling in the left ring and little finger, symptoms have progressed to increased tingling and numbness of the finger.  Increased symptoms when driving and using the left extremity.  knee.  They have tried the following therapies: naproxen.  She also reports notcing a mass of the left proximal volar ring finger, she reports this mass as resolved.  She also notes mass of the right long finger located to the PIP joint.      Patient is currently working , remotely from home.            Patient Active Problem List   Diagnosis     Chronic rhinitis     Atopic dermatitis     Migraines     Allergic rhinitis     Exhaustion due to excessive exertion(994.5)            Past Medical History:   Diagnosis Date     Allergic rhinitis, cause unspecified      Anxiety state, unspecified      Atopic dermatitis 10/10/2011     Cardiac dysrhythmia, unspecified      Chronic rhinitis 10/10/2011     Colitis      Exhaustion due to excessive exertion(994.5)      Gallstones      Hypercholesterolemia      Insomnia, unspecified      Major depressive disorder, single episode, unspecified      Migraines 2012     Osteoarthritis of CMC joint of thumb      Other acne      Other malaise and fatigue      Palpitations      PONV (postoperative nausea and vomiting)      Uncomplicated asthma     Occasional            Past Surgical History:   Procedure Laterality Date     BIOPSY      breast lumps      SECTION       COLONOSCOPY       DAVINCI LAPAROSCOPIC CHOLECYSTECTOMY WITHOUT GRAMS  2013    Procedure: DAVINCI LAPAROSCOPIC CHOLECYSTECTOMY WITHOUT GRAMS;  DAVINCI CHOLECYSTECTOMY;  Surgeon: Jac Stallings,  MD;  Location: [facility]     ENT SURGERY      deviated septum, blocked nasal passage     GYN SURGERY      repair of fallopian tubes and ovaries     LUMPECTOMY BREAST       LUMPECTOMY BREAST       NOSE SURGERY              Social History     Tobacco Use     Smoking status: Current Every Day Smoker     Packs/day: 0.50     Years: 15.00     Pack years: 7.50     Types: Cigarettes     Smokeless tobacco: Former User     Quit date: 10/1/2021     Tobacco comment: has her chantix   Substance Use Topics     Alcohol use: No            Family History   Problem Relation Age of Onset     C.A.D. Mother         MI early 50s;  59 heart problems     Breast Cancer Mother         Cancer Unknown Primary     Other Cancer Mother      Anesthesia Reaction Mother      C.A.D. Father      Diabetes Father      Breast Cancer Maternal Grandmother         Cancer Unknown Primary     Anxiety Disorder Sister      Thyroid Disease Paternal Grandmother                Allergies   Allergen Reactions     Codeine Nausea     Dust Mites      Latex             Current Outpatient Medications   Medication Sig Dispense Refill     albuterol (PROAIR HFA/PROVENTIL HFA/VENTOLIN HFA) 108 (90 Base) MCG/ACT inhaler INHALE 2 PUFFS INTO THE LUNGS EVERY 6 HOURS AS NEEDED FOR SHORTNESS OF BREATH / DYSPNEA OR WHEEZING 8.5 g 1     amoxicillin-clavulanate (AUGMENTIN) 875-125 MG tablet Take 1 tablet by mouth 2 times daily for 10 days 20 tablet 0     amphetamine-dextroamphetamine (ADDERALL) 20 MG tablet Take 1 tablet (20 mg) by mouth 2 times daily 60 tablet 0     amphetamine-dextroamphetamine (ADDERALL) 20 MG tablet Take 1 tablet (20 mg) by mouth 2 times daily for 30 days 60 tablet 0     [START ON 9/15/2022] amphetamine-dextroamphetamine (ADDERALL) 20 MG tablet Take 1 tablet (20 mg) by mouth 2 times daily for 30 days 60 tablet 0     amphetamine-dextroamphetamine (ADDERALL) 20 MG tablet TAKE ONE TABLET BY MOUTH TWICE A DAY 60 tablet 0     cholecalciferol 25 MCG (1000 UT) TABS  Take 1,000 Units by mouth       Cyanocobalamin (VITAMIN B12 PO)        Multiple Vitamins-Minerals (MULTIVITAMIN ADULT PO) Take by mouth daily       phenazopyridine (PYRIDIUM) 200 MG tablet             Review Of Systems  Skin: negative  Eyes: negative  Ears/Nose/Throat: negative  Respiratory: No shortness of breath, dyspnea on exertion, cough, or hemoptysis    O: Physical Exam: complaints of numbness and tingling both upper extremities.  No evidence thenar or hypothenar wasting.  Negative egawa and negative carpal tunnel.       Lab: update vit D/TSH/ inflammatory markers/ arthritic profile    Images:     IMPRESSION: Normal joint spacing and alignment. No fracture or joint  effusion.                                                                   IMPRESSION:  1.  Normal joint spacing and alignment.  2.  No fracture.  3.  No radiodense foreign body.     A:   Neuropathy upper extremities    P: Have discussed possible etiologies of symptoms to include cervical pathology/ tardy ulnar nerve palsy/carpal tunnel.  Would like EMG/NCV both upper extremities.  See back after studies.  Notify if exacerbation symptoms.  Obtain inflammatory markers/arthritic profile/other studies.           In addition to the above assessment and plan each active problem on Joanne's problem list was evaluated today. This included the questioning of Joanne for any medication problems. We will continue the current treatment plan for these active problems except as noted.

## 2022-09-13 NOTE — TELEPHONE ENCOUNTER
LVM- scheduled patient at 8:15 on 9/15 per Dans request below. Detailed this in VM to patient and told her to call or mychart if it didn't work out.    Sunita SOL  [facility]   [facility]

## 2022-09-14 ENCOUNTER — TELEPHONE (OUTPATIENT)
Dept: OTOLARYNGOLOGY | Facility: CLINIC | Age: 62
End: 2022-09-14

## 2022-09-14 DIAGNOSIS — G43.909 MIGRAINES: Primary | ICD-10-CM

## 2022-09-14 DIAGNOSIS — R42 VERTIGO: ICD-10-CM

## 2022-09-14 NOTE — TELEPHONE ENCOUNTER
----- Message from oDminik Peter MD sent at 9/14/2022  1:38 PM CDT -----  Regarding: FW: Recommendation for f/u with Migraine specialist  Huan Gan,  Can you put an order for neurology referral?  Thank you,  Jose Maria

## 2022-09-15 ENCOUNTER — OFFICE VISIT (OUTPATIENT)
Dept: FAMILY MEDICINE | Facility: CLINIC | Age: 62
End: 2022-09-15
Payer: COMMERCIAL

## 2022-09-15 VITALS
DIASTOLIC BLOOD PRESSURE: 70 MMHG | BODY MASS INDEX: 29.78 KG/M2 | OXYGEN SATURATION: 98 % | WEIGHT: 193 LBS | HEART RATE: 104 BPM | RESPIRATION RATE: 16 BRPM | TEMPERATURE: 98.8 F | SYSTOLIC BLOOD PRESSURE: 116 MMHG

## 2022-09-15 DIAGNOSIS — R42 DIZZINESS: ICD-10-CM

## 2022-09-15 DIAGNOSIS — M81.0 OSTEOPOROSIS: ICD-10-CM

## 2022-09-15 DIAGNOSIS — K57.32 DIVERTICULITIS OF COLON: ICD-10-CM

## 2022-09-15 DIAGNOSIS — G43.809 OTHER MIGRAINE WITHOUT STATUS MIGRAINOSUS, NOT INTRACTABLE: ICD-10-CM

## 2022-09-15 DIAGNOSIS — R22.31 MASS OF FINGER, RIGHT: ICD-10-CM

## 2022-09-15 DIAGNOSIS — I70.0 ATHEROSCLEROSIS OF ABDOMINAL AORTA (H): ICD-10-CM

## 2022-09-15 DIAGNOSIS — F17.200 NICOTINE DEPENDENCE, UNCOMPLICATED, UNSPECIFIED NICOTINE PRODUCT TYPE: ICD-10-CM

## 2022-09-15 DIAGNOSIS — G56.22 ULNAR NEUROPATHY OF LEFT UPPER EXTREMITY: ICD-10-CM

## 2022-09-15 DIAGNOSIS — Z09 FOLLOW-UP EXAM: Primary | ICD-10-CM

## 2022-09-15 DIAGNOSIS — R91.8 PULMONARY NODULES: ICD-10-CM

## 2022-09-15 LAB
CRP SERPL-MCNC: 10.8 MG/L
D DIMER PPP FEU-MCNC: 1 UG/ML FEU (ref 0–0.5)
DEPRECATED CALCIDIOL+CALCIFEROL SERPL-MC: 41 UG/L (ref 20–75)
ERYTHROCYTE [SEDIMENTATION RATE] IN BLOOD BY WESTERGREN METHOD: 17 MM/HR (ref 0–30)

## 2022-09-15 PROCEDURE — 86140 C-REACTIVE PROTEIN: CPT | Performed by: PHYSICIAN ASSISTANT

## 2022-09-15 PROCEDURE — 99214 OFFICE O/P EST MOD 30 MIN: CPT | Performed by: PHYSICIAN ASSISTANT

## 2022-09-15 PROCEDURE — 82306 VITAMIN D 25 HYDROXY: CPT | Performed by: PHYSICIAN ASSISTANT

## 2022-09-15 PROCEDURE — 86431 RHEUMATOID FACTOR QUANT: CPT | Performed by: PHYSICIAN ASSISTANT

## 2022-09-15 PROCEDURE — 36415 COLL VENOUS BLD VENIPUNCTURE: CPT | Performed by: PHYSICIAN ASSISTANT

## 2022-09-15 PROCEDURE — 86038 ANTINUCLEAR ANTIBODIES: CPT | Performed by: PHYSICIAN ASSISTANT

## 2022-09-15 PROCEDURE — 85379 FIBRIN DEGRADATION QUANT: CPT | Performed by: PHYSICIAN ASSISTANT

## 2022-09-15 PROCEDURE — 85652 RBC SED RATE AUTOMATED: CPT | Performed by: PHYSICIAN ASSISTANT

## 2022-09-15 PROCEDURE — 99406 BEHAV CHNG SMOKING 3-10 MIN: CPT | Performed by: PHYSICIAN ASSISTANT

## 2022-09-15 PROCEDURE — 84443 ASSAY THYROID STIM HORMONE: CPT | Performed by: PHYSICIAN ASSISTANT

## 2022-09-15 PROCEDURE — 86225 DNA ANTIBODY NATIVE: CPT | Performed by: PHYSICIAN ASSISTANT

## 2022-09-15 PROCEDURE — 84550 ASSAY OF BLOOD/URIC ACID: CPT | Performed by: PHYSICIAN ASSISTANT

## 2022-09-15 PROCEDURE — 86039 ANTINUCLEAR ANTIBODIES (ANA): CPT | Performed by: PHYSICIAN ASSISTANT

## 2022-09-15 RX ORDER — ATORVASTATIN CALCIUM 20 MG/1
20 TABLET, FILM COATED ORAL DAILY
Qty: 90 TABLET | Refills: 1 | Status: SHIPPED | OUTPATIENT
Start: 2022-09-15 | End: 2023-11-07

## 2022-09-15 RX ORDER — NICOTINE 21 MG/24HR
1 PATCH, TRANSDERMAL 24 HOURS TRANSDERMAL EVERY 24 HOURS
Qty: 30 PATCH | Refills: 0 | Status: SHIPPED | OUTPATIENT
Start: 2022-09-15 | End: 2023-11-07

## 2022-09-15 ASSESSMENT — PAIN SCALES - GENERAL: PAINLEVEL: NO PAIN (0)

## 2022-09-15 ASSESSMENT — ASTHMA QUESTIONNAIRES
ACT_TOTALSCORE: 20
QUESTION_1 LAST FOUR WEEKS HOW MUCH OF THE TIME DID YOUR ASTHMA KEEP YOU FROM GETTING AS MUCH DONE AT WORK, SCHOOL OR AT HOME: A LITTLE OF THE TIME
QUESTION_5 LAST FOUR WEEKS HOW WOULD YOU RATE YOUR ASTHMA CONTROL: WELL CONTROLLED
QUESTION_3 LAST FOUR WEEKS HOW OFTEN DID YOUR ASTHMA SYMPTOMS (WHEEZING, COUGHING, SHORTNESS OF BREATH, CHEST TIGHTNESS OR PAIN) WAKE YOU UP AT NIGHT OR EARLIER THAN USUAL IN THE MORNING: NOT AT ALL
ACT_TOTALSCORE: 20
QUESTION_2 LAST FOUR WEEKS HOW OFTEN HAVE YOU HAD SHORTNESS OF BREATH: ONCE OR TWICE A WEEK
QUESTION_4 LAST FOUR WEEKS HOW OFTEN HAVE YOU USED YOUR RESCUE INHALER OR NEBULIZER MEDICATION (SUCH AS ALBUTEROL): TWO OR THREE TIMES PER WEEK

## 2022-09-15 NOTE — PATIENT INSTRUCTIONS
You can call [phone] to schedule your imaging at [facility].         How to Quit Smoking  Smoking is a hard habit to break. About 50% of all people who have ever smoked have been able to quit. Most people who still smoke want to quit. Here are some of the best ways to stop smoking.     Keep in mind the health benefits of quitting  The health benefits of quitting start right away. They keep improving the longer you go without smoking. Knowing this can help inspire you to stay on track. These benefits occur at any age. If you are 17 or 70, quitting is a good choice. Some of the health benefits after your last cigarette include:   After 20 minutes: Your blood pressure and pulse return to normal.  After 8 hours: Your oxygen levels return to normal.  After 2 days: Your ability to smell and taste start to improve as damaged nerves regrow.  After 2 to 3 weeks: Your circulation and lung function improve.  After 1 to 9 months: Your coughing, congestion, and shortness of breath decrease. Your tiredness decreases.  After 1 year: Your risk of heart attack decreases by 50%.  After 5 years: Your risk of lung cancer decreases by 50%. Your risk of stroke becomes the same as a nonsmoker s.  Go cold turkey  Most former smokers quit cold turkey. This means stopping all at once. Trying to cut back slowly often doesn't work as well. This may be because it continues the habit of smoking. Also you may inhale more smoke while smoking fewer cigarettes. This leads to the same amount of nicotine in your body.   Get support  Support programs can be a big help, especially for heavy smokers. These groups offer lectures, ways to change behavior, and peer support. Here are some ways to find a support program:   Free national quitline 800-QUIT-NOW (158-659-2109)  Hospital quit-smoking programs  American Lung Association 842-776-6302  American Cancer Society 716-271-1313  Support at home is important too. Family and friends can offer  praise and reassurance. If the smoker in your life finds it hard to quit, encourage them to keep trying.   Try over-the-counter medicine  Nicotine replacement therapy may make it easier to quit. Some aids are available without a prescription. These include a nicotine patch, gum, and lozenges. But it's best to use these under the care of your healthcare provider. The skin patch gives a steady supply of nicotine. Nicotine gum and lozenges give short-time doses of low levels of nicotine. Both methods reduce the craving for cigarettes. If you have nausea, vomiting, dizziness, weakness, or a fast heartbeat, stop using these products. See your provider.   Ask about prescription medicine  After reviewing your smoking patterns and past attempts to quit, your healthcare provider may offer a prescription medicine such as bupropion, varenicline, a nicotine inhaler, or nasal spray. Each has advantages and side effects. Your provider can review these with you.   Keep trying  Most smokers make many attempts at quitting before they succeed. It s important not to give up.   To learn more  For more on how to quit smoking, try these resources:   www.cdc.gov/tobacco/quit_smoking/ 800-QUIT-NOW (826-492-0734)  www.smokefree.gov 877-44U-QUIT (402-638-1632)  www.lung.org/stop-smoking/ 800-LUNGUSA (220-105-3164)  Antonio last reviewed this educational content on 12/1/2019 2000-2021 [employer]. All rights reserved. This information is not intended as a substitute for professional medical care. Always follow your healthcare professional's instructions.

## 2022-09-15 NOTE — PROGRESS NOTES
"  Assessment & Plan     Follow-up exam  Diverticulitis of colon  She is doing well overall; toelrating abx and symptoms improved. Reviewed diverticulitis and follow up down the road. Return if symptoms worsening    Atherosclerosis of abdominal aorta (H)  Reviewed her recent CT scan. Needs to quit smoking. Starting on statin and low dose asa. Monitor symptoms.   - atorvastatin (LIPITOR) 20 MG tablet; Take 1 tablet (20 mg) by mouth daily  - aspirin (ASA) 81 MG EC tablet; Take 1 tablet (81 mg) by mouth daily    Nicotine dependence, uncomplicated, unspecified nicotine product type  See above. Reviewed tips for cessation. Right now around 1ppd.   - [place] Quit Partner Referral; Future  - SMOKING CESSATION COUNSELING 3-10 MIN  - CT Chest w Contrast; Future    Pulmonary nodules  Needs dedicated imaging.   - CT Chest w Contrast; Future    Dizziness  Migraine  Consider vestibular migraine after recommendation from physical therapy. Physical therapy has helped quite a bit; this is not sinus driven. She decliens preventive trial (beta blocker) but would see neruology; referral has been placed               BMI:   Estimated body mass index is 29.78 kg/m  as calculated from the following:    Height as of 6/24/22: 1.715 m (5' 7.5\").    Weight as of this encounter: 87.5 kg (193 lb).           Return in about 6 months (around 3/15/2023).    Fermín Larios PA-C  [facility] STEPHONMOHUBER Rubio is a 62 year old, presenting for the following health issues:  ER F/U (9/11/22/)      HPI     ED/UC Followup:    Facility:  [facility]  Date of visit: 9/11/22  Reason for visit: Diverticulitis of colon  Current Status: feeling better.    Joanne Padilla is a 62 year old female who presents today for ED follow up for   diverticulitis  Experienced a painful band around her low abdomen but tried to simply watch things  Eventually the pain transitioned to the midleft and low quadrant  Had not been eating due to symptoms " but now started yesterday and things went well  She is tolerating abx well; some nausea initially but better      Review of Systems   Constitutional, HEENT, cardiovascular, pulmonary, gi and gu systems are negative, except as otherwise noted.      Objective    /70 (BP Location: Right arm, Patient Position: Sitting, Cuff Size: Adult Regular)   Pulse 104   Temp 98.8  F (37.1  C) (Oral)   Resp 16   Wt 87.5 kg (193 lb)   LMP  (LMP Unknown)   SpO2 98%   BMI 29.78 kg/m    Body mass index is 29.78 kg/m .  Physical Exam   GENERAL: healthy, alert and no distress  RESP: lungs clear to auscultation - no rales, rhonchi or wheezes  CV: tachycardia, normal S1 S2, no S3 or S4 and no murmur, click or rub  ABDOMEN: soft, nontender, no hepatosplenomegaly, no masses and bowel sounds normal  PSYCH: mentation appears normal, affect normal/bright

## 2022-09-16 LAB
ANA PAT SER IF-IMP: ABNORMAL
ANA SER QL IF: POSITIVE
ANA TITR SER IF: ABNORMAL {TITER}
DSDNA AB SER-ACNC: <0.6 IU/ML
RHEUMATOID FACT SER NEPH-ACNC: <6 IU/ML
TSH SERPL DL<=0.005 MIU/L-ACNC: 2.35 MU/L (ref 0.4–4)
URATE SERPL-MCNC: 3.9 MG/DL (ref 2.6–6)

## 2022-09-21 ENCOUNTER — HOSPITAL ENCOUNTER (OUTPATIENT)
Dept: CT IMAGING | Facility: CLINIC | Age: 62
Discharge: HOME | End: 2022-09-21
Attending: PHYSICIAN ASSISTANT | Admitting: PHYSICIAN ASSISTANT
Payer: COMMERCIAL

## 2022-09-21 DIAGNOSIS — F17.200 NICOTINE DEPENDENCE, UNCOMPLICATED, UNSPECIFIED NICOTINE PRODUCT TYPE: ICD-10-CM

## 2022-09-21 DIAGNOSIS — R91.8 PULMONARY NODULES: ICD-10-CM

## 2022-09-21 PROCEDURE — 250N000009 HC RX 250: Performed by: PHYSICIAN ASSISTANT

## 2022-09-21 PROCEDURE — 71260 CT THORAX DX C+: CPT

## 2022-09-21 PROCEDURE — 250N000011 HC RX IP 250 OP 636: Performed by: PHYSICIAN ASSISTANT

## 2022-09-21 RX ORDER — IOPAMIDOL 755 MG/ML
500 INJECTION, SOLUTION INTRAVASCULAR ONCE
Status: COMPLETED | OUTPATIENT
Start: 2022-09-21 | End: 2022-09-21

## 2022-09-21 RX ADMIN — SODIUM CHLORIDE 62 ML: 9 INJECTION, SOLUTION INTRAVENOUS at 14:27

## 2022-09-21 RX ADMIN — IOPAMIDOL 86 ML: 755 INJECTION, SOLUTION INTRAVENOUS at 14:27

## 2022-09-27 ENCOUNTER — HOSPITAL ENCOUNTER (OUTPATIENT)
Dept: PHYSICAL THERAPY | Facility: CLINIC | Age: 62
Setting detail: THERAPIES SERIES
Discharge: HOME | End: 2022-09-27
Attending: PHYSICIAN ASSISTANT
Payer: COMMERCIAL

## 2022-09-27 PROCEDURE — 97112 NEUROMUSCULAR REEDUCATION: CPT | Mod: GP | Performed by: PHYSICAL THERAPIST

## 2022-10-07 ENCOUNTER — OFFICE VISIT (OUTPATIENT)
Dept: NEUROLOGY | Facility: CLINIC | Age: 62
End: 2022-10-07
Attending: OTOLARYNGOLOGY
Payer: COMMERCIAL

## 2022-10-07 VITALS — SYSTOLIC BLOOD PRESSURE: 124 MMHG | DIASTOLIC BLOOD PRESSURE: 85 MMHG | OXYGEN SATURATION: 98 % | HEART RATE: 102 BPM

## 2022-10-07 DIAGNOSIS — R42 VERTIGO: ICD-10-CM

## 2022-10-07 DIAGNOSIS — S06.9X0A TRAUMATIC BRAIN INJURY, WITHOUT LOSS OF CONSCIOUSNESS, INITIAL ENCOUNTER (H): ICD-10-CM

## 2022-10-07 DIAGNOSIS — G43.709 CHRONIC MIGRAINE WITHOUT AURA WITHOUT STATUS MIGRAINOSUS, NOT INTRACTABLE: Primary | ICD-10-CM

## 2022-10-07 PROCEDURE — 99204 OFFICE O/P NEW MOD 45 MIN: CPT | Performed by: PSYCHIATRY & NEUROLOGY

## 2022-10-07 RX ORDER — RIZATRIPTAN BENZOATE 5 MG/1
5 TABLET, ORALLY DISINTEGRATING ORAL
Qty: 18 TABLET | Refills: 3 | Status: SHIPPED | OUTPATIENT
Start: 2022-10-07 | End: 2023-11-07

## 2022-10-07 RX ORDER — RIZATRIPTAN BENZOATE 10 MG/1
10 TABLET, ORALLY DISINTEGRATING ORAL
Qty: 18 TABLET | Refills: 3 | Status: SHIPPED | OUTPATIENT
Start: 2022-10-07 | End: 2022-10-07

## 2022-10-07 RX ORDER — NAPROXEN 500 MG/1
500 TABLET ORAL 2 TIMES DAILY PRN
Qty: 28 TABLET | Refills: 3 | Status: SHIPPED | OUTPATIENT
Start: 2022-10-07 | End: 2023-11-07

## 2022-10-07 RX ORDER — PROPRANOLOL HYDROCHLORIDE 40 MG/1
80 TABLET ORAL 2 TIMES DAILY
Qty: 120 TABLET | Refills: 3 | Status: SHIPPED | OUTPATIENT
Start: 2022-10-07 | End: 2022-12-08

## 2022-10-07 RX ORDER — ONDANSETRON 4 MG/1
4 TABLET, ORALLY DISINTEGRATING ORAL EVERY 8 HOURS PRN
Qty: 20 TABLET | Refills: 3 | Status: SHIPPED | OUTPATIENT
Start: 2022-10-07 | End: 2023-11-07

## 2022-10-07 ASSESSMENT — PAIN SCALES - GENERAL: PAINLEVEL: SEVERE PAIN (7)

## 2022-10-07 NOTE — PROGRESS NOTES
[facility]   Headache Neurology Consult  October 7, 2022     Joanne Padilla MRN# [id number]   YOB: 1960 Age: 62 year old     Requesting physician: Dominik Peter MD         Assessment and Recommendations:     Jonane Padilla is a 62 year old female who presents for further evaluation of headache and other neurologic symptoms.    Her headache presentation shows chronic daily headaches since January 2022.  This is in the setting of a fall on the ice at the end of December 2021, raising suspicion for chronic posttraumatic headache and possible traumatic brain injury.  She also has a history of pulmonary nodule and smokes cigarettes daily.    Her neurologic examination shows intermittent word finding difficulty, restricted lateral head movement to the right.    For further evaluation for structural causes of her symptoms, I recommend further evaluation with an MRI of the brain with and without contrast and MRV of the head with contrast.  -She has had an updated ophthalmologic examination, which showed borderline glaucoma, dry eye, without evidence of papilledema.  -She will continue physical therapy for vestibular symptoms and balance.  PT for her neck could also be considered.  -I recommend occupational therapy for cognitive assessment and strategies.    Her headaches are causing significant functional disability.  We discussed acute and preventative treatments.    -For acute treatment of mild headache, I recommend naproxen 500 mg twice a day as needed, not to exceed more than 14 days/month to avoid medication overuse.  -For acute treatment of moderate to severe headache, I recommend rizatriptan 5 mg oral dissolvable tablet at the onset of headache, with a repeat dose in 2 hours if this should not exceed more than 9 days/month to avoid medication overuse.  -Headache related nausea, I recommend ondansetron 4 mg as needed.      Her headache frequency and severity  "warrant prevention.    -Amitriptyline will be avoided due to elevated pulse and plan to start Wellbutrin.    -Topiramate will be avoided due to potential for cognitive side effects.    -I recommend a trial of propranolol 20 mg daily for 1 week, titrating up by 40 mg each week, as needed and tolerated, to a goal dose of 80 mg twice daily.  Potential side effects were discussed. I recommend a 6 to 8-week trial at the maximum tolerated dose prior to determining effectiveness.  -In the future, botulinum toxin injections or a CGRP inhibitor could be considered.  Gabapentin, verapamil could be other alternative options.    I will plan to see her back in 3 to 4 months to monitor progress.    Veronica Nelson MD  Neurology            Chief Complaint:     Chief Complaint   Patient presents with     Headache     migraines/ Vertigo - Ref / Records in epic           History is obtained from the patient and medical record.      Joanne Padilla is a 62 year old female who has been living with migraine attacks 3-4 times per year since age 18. She would have a \"blast\" of severe pain x 1 day with nausea, vomiting, photophobia, phonophobia. She tried triptans in the past, including Imitrex injections, but these weren't as effective. She would sleep them off.    In December 2021, she fell on ice onto hands and knees. Head thrown forward but didn't hit the ground. She was \"stunned\" and sat in the snow, confused. She called her son to help her to get off the ground. She went to Urgent Care. Her neck was sore. She had vertigo. She had knee and hand injuries.    In January 2022, new symptoms started. She recalls balance difficulty, nausea/motion sickness, headaches daily, cognitive difficulty/brain fog.    She feels like there is fluid in her head, between her brain and skull, an unpleasant sensation. Her head \"hurts\", feels \"bruised\", but she denies \"pain\". This occurs over the top of her head and around the sides. This is worst in the " morning. This rates 3 to 7/10, variable throughout the day.    She has 30/30 headache days per month, with 30/30 severe headache days per month.     She has associated photophobia, phonophobia, nausea, no vomiting.    She reports changes in vision, with blurriness and difficulty reading. She has numbness in her 4th and 5th digits on the left hand, constant, present since the fall.     She went to PT for BPPV, and vestibular migraine was brought up.      She has not tried any other treatments.    She has a prescription for Wellbutrin to help quit smoking. She hasn't started this yet.            Past Medical History:     Diverticulitis   Pulmonary nodule    Past Medical History:   Diagnosis Date     Allergic rhinitis, cause unspecified      Anxiety state, unspecified      Atopic dermatitis 10/10/2011     Cardiac dysrhythmia, unspecified      Chronic rhinitis 10/10/2011     Colitis      Exhaustion due to excessive exertion(994.5)      Gallstones      Hypercholesterolemia      Insomnia, unspecified      Major depressive disorder, single episode, unspecified      Migraines 2012     Osteoarthritis of CMC joint of thumb      Other acne      Other malaise and fatigue      Palpitations      PONV (postoperative nausea and vomiting)      Uncomplicated asthma     Occasional              Past Surgical History:     Past Surgical History:   Procedure Laterality Date     BIOPSY      breast lumps      SECTION       COLONOSCOPY       DAVINCI LAPAROSCOPIC CHOLECYSTECTOMY WITHOUT GRAMS  2013    Procedure: DAVINCI LAPAROSCOPIC CHOLECYSTECTOMY WITHOUT GRAMS;  DAVINCI CHOLECYSTECTOMY;  Surgeon: Jac Stallings MD;  Location:  OR     ENT SURGERY      deviated septum, blocked nasal passage     GYN SURGERY      repair of fallopian tubes and ovaries     LUMPECTOMY BREAST       LUMPECTOMY BREAST       NOSE SURGERY               Social History:   She is working as a . She is working virtually. This is  difficult.     Social History     Socioeconomic History     Marital status:      Spouse name: Not on file     Number of children: Not on file     Years of education: Not on file     Highest education level: Not on file   Occupational History     Not on file   Tobacco Use     Smoking status: Current Every Day Smoker     Packs/day: 0.50     Years: 15.00     Pack years: 7.50     Types: Cigarettes     Smokeless tobacco: Former User     Quit date: 10/1/2021     Tobacco comment: has her chantix   Vaping Use     Vaping Use: Never used   Substance and Sexual Activity     Alcohol use: No     Drug use: No     Sexual activity: Not Currently     Partners: Male     Birth control/protection: Pill, None   Other Topics Concern     Parent/sibling w/ CABG, MI or angioplasty before 65F 55M? No   Social History Narrative     Not on file     Social Determinants of Health     Financial Resource Strain: Not on file   Food Insecurity: Not on file   Transportation Needs: Not on file   Physical Activity: Not on file   Stress: Not on file   Social Connections: Not on file   Intimate Partner Violence: Not on file   Housing Stability: Not on file             Family History:   There is a family history of migraine in her mother.    Family History   Problem Relation Age of Onset     C.A.D. Mother         MI early 50s;  59 heart problems     Breast Cancer Mother         Cancer Unknown Primary     Other Cancer Mother      Anesthesia Reaction Mother      C.A.D. Father      Diabetes Father      Breast Cancer Maternal Grandmother         Cancer Unknown Primary     Anxiety Disorder Sister      Thyroid Disease Paternal Grandmother              Allergies:      Allergies   Allergen Reactions     Codeine Nausea     Dust Mites      Latex              Medications:     Current Outpatient Medications:      albuterol (PROAIR HFA/PROVENTIL HFA/VENTOLIN HFA) 108 (90 Base) MCG/ACT inhaler, INHALE 2 PUFFS INTO THE LUNGS EVERY 6 HOURS AS NEEDED FOR  SHORTNESS OF BREATH / DYSPNEA OR WHEEZING, Disp: 8.5 g, Rfl: 1     amphetamine-dextroamphetamine (ADDERALL) 20 MG tablet, Take 1 tablet (20 mg) by mouth 2 times daily, Disp: 60 tablet, Rfl: 0     amphetamine-dextroamphetamine (ADDERALL) 20 MG tablet, Take 1 tablet (20 mg) by mouth 2 times daily for 30 days, Disp: 60 tablet, Rfl: 0     amphetamine-dextroamphetamine (ADDERALL) 20 MG tablet, TAKE ONE TABLET BY MOUTH TWICE A DAY, Disp: 60 tablet, Rfl: 0     aspirin (ASA) 81 MG EC tablet, Take 1 tablet (81 mg) by mouth daily, Disp: 90 tablet, Rfl: 3     atorvastatin (LIPITOR) 20 MG tablet, Take 1 tablet (20 mg) by mouth daily, Disp: 90 tablet, Rfl: 1     cholecalciferol 25 MCG (1000 UT) TABS, Take 1,000 Units by mouth, Disp: , Rfl:      Cyanocobalamin (VITAMIN B12 PO), , Disp: , Rfl:      Multiple Vitamins-Minerals (MULTIVITAMIN ADULT PO), Take by mouth daily, Disp: , Rfl:      nicotine (NICODERM CQ) 14 MG/24HR 24 hr patch, Place 1 patch onto the skin every 24 hours, Disp: 30 patch, Rfl: 0     nicotine (NICODERM CQ) 21 MG/24HR 24 hr patch, Place 1 patch onto the skin every 24 hours, Disp: 30 patch, Rfl: 0     phenazopyridine (PYRIDIUM) 200 MG tablet, , Disp: , Rfl:           Physical Exam:   /85   Pulse 102   LMP  (LMP Unknown)   SpO2 98%    Physical Exam:   General: NAD  Neurologic:   Mental Status Exam: Alert, awake and oriented to situation. No dysarthria. Speech of normal fluency generally, with occasional word finding difficulty.   Cranial Nerves: Fundoscopic exam with clear disc margins bilaterally. PERRLA, EOMs intact, no nystagmus, facial movements symmetric, facial sensation intact to light touch, hearing intact to conversation, trapezius and SCMs 5/5 bilaterally, tongue midline and fully mobile. No tongue atrophy or fasciculations.    Motor: Normal tone in all four extremities, no atrophy or fasciculations. 5/5 strength bilaterally in shoulder abduction, elbow extensors and flexors, wrist extensors and  flexors, hip flexors, knee extensors and flexors, dorsi- and plantarflexion. No tremors or abnormal movements noted.   Sensory: Sensation intact to light touch on arms and legs bilaterally.    Coordination: Finger-nose-finger intact bilaterally.  Rapidly alternating movements intact bilaterally in the upper extremities.  Normal finger tapping bilaterally.  Normal Romberg.   Reflexes: 3+ and symmetric in triceps, biceps, brachioradialis, patellar, Achilles, and plantars equivocal bilaterally.   Gait: Normal gait.  Able to toe and heel walk.  Tandem gait normal.  Head: Normocephalic, atraumatic. No radiating pain with palpation over the supraorbital notches, occipital nerves.  Temporal pulses intact.   Neck: Reduced range of motion with lateral head movement to the right, normal with left lateral movement and neck flexion.  Eyes: No conjunctival injection, no scleral icterus.           Data:     CT sinus wo contrast (6/27/2022):  1. No evidence of sinusitis.  2. Curvilinear hyperdensity along the right nasal septum, presumably  related to recent silver nitrate cauterization.

## 2022-10-12 ENCOUNTER — MYC REFILL (OUTPATIENT)
Dept: FAMILY MEDICINE | Facility: CLINIC | Age: 62
End: 2022-10-12

## 2022-10-12 DIAGNOSIS — F98.8 ATTENTION DEFICIT DISORDER (ADD) WITHOUT HYPERACTIVITY: ICD-10-CM

## 2022-10-12 RX ORDER — DEXTROAMPHETAMINE SACCHARATE, AMPHETAMINE ASPARTATE, DEXTROAMPHETAMINE SULFATE AND AMPHETAMINE SULFATE 5; 5; 5; 5 MG/1; MG/1; MG/1; MG/1
20 TABLET ORAL 2 TIMES DAILY
Qty: 60 TABLET | Refills: 0 | Status: SHIPPED | OUTPATIENT
Start: 2022-10-12 | End: 2022-11-11

## 2022-10-16 ENCOUNTER — HEALTH MAINTENANCE LETTER (OUTPATIENT)
Age: 62
End: 2022-10-16

## 2022-10-21 ENCOUNTER — TELEPHONE (OUTPATIENT)
Dept: NEUROLOGY | Facility: CLINIC | Age: 62
End: 2022-10-21

## 2022-10-21 NOTE — TELEPHONE ENCOUNTER
[facility] Call Center    Phone Message    May a detailed message be left on voicemail: yes     Reason for Call: Other: Pt is calling because she saw Dr. Nelson a couple of weeks ago. Pt says Dr. Nelson thinks she has post concussion headaches and even a possible TBI. Pt says her 2 sons just lost their father and she is trying to help them deal with that but she is struggling. She says she hasn't taken any of the new meds Dr. Nelson prescribed. She says she has trouble retaining information and she feels like her brain is shutting down.  She would like Dr. Nelson or a nurse help her navigate these new meds so she can better function in order to support her kids.  Action Taken: Message routed to:  Other:  NEUROLOGY    Travel Screening: Not Applicable

## 2022-10-22 ENCOUNTER — NURSE TRIAGE (OUTPATIENT)
Dept: NURSING | Facility: CLINIC | Age: 62
End: 2022-10-22

## 2022-10-22 NOTE — TELEPHONE ENCOUNTER
Patient calling, states that she has been feeling sick for a week. States that she has a sore throat, headache, body aches, chills, cough, sinus drainage and fatigue. States her son tested positive for covid last week. Patient has not tested for covid. Patient refused triage--states that she will take a covid test and if positive she will follow the guidelines that were given to her son. She will call back if she has any further questions.     ADONAY BRADLEY RN

## 2022-10-24 NOTE — TELEPHONE ENCOUNTER
Called pt back.  She was wondering if the medications that Dr. Nleson prescribed will help with her brain fog.  Advised that medications are intended to help with her headaches, which may in turn help with the brain fog, but it is not specifically prescribed for brain fog.      Went through medications and some of the side effects.  Advised that if she has more questions she can myChart or call.  Set up appt for 1/5/22, and added to wait list.  Pt has neuro eval 10/27/22 and MRI 10/29/22.      Pt verbalized understanding and acceptance of the plan. Pt had no further questions at this time.  Advised can call back to clinic at any time with concerns.     Jessica SOSA RN, BSN  [facility]

## 2022-10-27 ENCOUNTER — HOSPITAL ENCOUNTER (OUTPATIENT)
Dept: OCCUPATIONAL THERAPY | Facility: CLINIC | Age: 62
Setting detail: THERAPIES SERIES
Discharge: HOME | End: 2022-10-27
Attending: PHYSICIAN ASSISTANT
Payer: COMMERCIAL

## 2022-10-27 DIAGNOSIS — S06.9X0A TRAUMATIC BRAIN INJURY, WITHOUT LOSS OF CONSCIOUSNESS, INITIAL ENCOUNTER (H): ICD-10-CM

## 2022-10-27 PROCEDURE — 97535 SELF CARE MNGMENT TRAINING: CPT | Mod: GO

## 2022-10-27 PROCEDURE — 97165 OT EVAL LOW COMPLEX 30 MIN: CPT | Mod: GO

## 2022-10-27 ASSESSMENT — ACTIVITIES OF DAILY LIVING (ADL)
IADL_QUICK_ADDS: MEAL PLANNING/PREPARATION;HOME/FINANCIAL/MANAGEMENT;COMMUNICATION/COMPUTER USE;COMMUNITY MOBILITY;CARE OF OTHERS
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
## 2022-10-29 ENCOUNTER — HOSPITAL ENCOUNTER (OUTPATIENT)
Dept: MRI IMAGING | Facility: CLINIC | Age: 62
Discharge: HOME | End: 2022-10-29
Attending: PSYCHIATRY & NEUROLOGY
Payer: COMMERCIAL

## 2022-10-29 DIAGNOSIS — R42 VERTIGO: ICD-10-CM

## 2022-10-29 DIAGNOSIS — G43.709 CHRONIC MIGRAINE WITHOUT AURA WITHOUT STATUS MIGRAINOSUS, NOT INTRACTABLE: ICD-10-CM

## 2022-10-29 PROCEDURE — 255N000002 HC RX 255 OP 636: Performed by: PSYCHIATRY & NEUROLOGY

## 2022-10-29 PROCEDURE — 70546 MR ANGIOGRAPH HEAD W/O&W/DYE: CPT

## 2022-10-29 PROCEDURE — A9585 GADOBUTROL INJECTION: HCPCS | Performed by: PSYCHIATRY & NEUROLOGY

## 2022-10-29 PROCEDURE — 70553 MRI BRAIN STEM W/O & W/DYE: CPT

## 2022-10-29 RX ORDER — GADOBUTROL 604.72 MG/ML
0.1 INJECTION INTRAVENOUS ONCE
Status: COMPLETED | OUTPATIENT
Start: 2022-10-29 | End: 2022-10-29

## 2022-10-29 RX ADMIN — GADOBUTROL 10 ML: 604.72 INJECTION INTRAVENOUS at 13:09

## 2022-11-11 ENCOUNTER — MYC REFILL (OUTPATIENT)
Dept: FAMILY MEDICINE | Facility: CLINIC | Age: 62
End: 2022-11-11

## 2022-11-11 DIAGNOSIS — F98.8 ATTENTION DEFICIT DISORDER (ADD) WITHOUT HYPERACTIVITY: ICD-10-CM

## 2022-11-11 RX ORDER — DEXTROAMPHETAMINE SACCHARATE, AMPHETAMINE ASPARTATE, DEXTROAMPHETAMINE SULFATE AND AMPHETAMINE SULFATE 5; 5; 5; 5 MG/1; MG/1; MG/1; MG/1
20 TABLET ORAL 2 TIMES DAILY
Qty: 60 TABLET | Refills: 0 | Status: SHIPPED | OUTPATIENT
Start: 2022-11-11 | End: 2022-12-13

## 2022-11-11 NOTE — TELEPHONE ENCOUNTER
Routing refill request to provider for review/approval because:  Drug not on the [facility] refill protocol     Yandy Easley RN

## 2022-11-17 ENCOUNTER — HOSPITAL ENCOUNTER (OUTPATIENT)
Dept: OCCUPATIONAL THERAPY | Facility: CLINIC | Age: 62
Setting detail: THERAPIES SERIES
Discharge: HOME | End: 2022-11-17
Attending: PHYSICIAN ASSISTANT
Payer: COMMERCIAL

## 2022-11-17 PROCEDURE — 97535 SELF CARE MNGMENT TRAINING: CPT | Mod: GO

## 2022-11-30 ENCOUNTER — HOSPITAL ENCOUNTER (OUTPATIENT)
Dept: OCCUPATIONAL THERAPY | Facility: CLINIC | Age: 62
Setting detail: THERAPIES SERIES
Discharge: HOME | End: 2022-11-30
Attending: PHYSICIAN ASSISTANT
Payer: COMMERCIAL

## 2022-11-30 PROCEDURE — 97535 SELF CARE MNGMENT TRAINING: CPT | Mod: GO

## 2022-12-07 ENCOUNTER — NURSE TRIAGE (OUTPATIENT)
Dept: FAMILY MEDICINE | Facility: CLINIC | Age: 62
End: 2022-12-07

## 2022-12-07 ENCOUNTER — HOSPITAL ENCOUNTER (OUTPATIENT)
Dept: OCCUPATIONAL THERAPY | Facility: CLINIC | Age: 62
Setting detail: THERAPIES SERIES
Discharge: HOME | End: 2022-12-07
Attending: OTOLARYNGOLOGY
Payer: COMMERCIAL

## 2022-12-07 PROCEDURE — 97535 SELF CARE MNGMENT TRAINING: CPT | Mod: GO

## 2022-12-07 NOTE — TELEPHONE ENCOUNTER
Nurse Triage SBAR    Is this a 2nd Level Triage? YES, LICENSED PRACTITIONER REVIEW IS REQUIRED    Situation:   Patient with diverticulitis flare up with abdominal pain    Background:   Patient has hx of diverticulitis (last flare was 9/2022) and placed on abx, patient states that 3 days ago she started to have abdominal pain. No recent changes in medications or diet. Patient states that she stopped eating 3 days ago, and has been trying to sit still to avoid the pain. Patient states that she would like to go back on abx if possible.    Assessment:   See below    Complaining of:  Abdominal pain, slight back pain, nausea,     Denies:   SOB, chest pain, bloody stool, vomiting, vomiting blood,     Protocol Recommended Disposition:   See in Office Today    Recommendation:   Per POD, ok to schedule with PCP tomorrow, advised that if she develops any bleeding in her BM or if she starts vomiting blood or having increased bloating she needs to be seen in the ED. Patient states she understands and will comply.      Huddled with POD    Reason for Disposition    Age > 60 years    Additional Information    Negative: Passed out (i.e., fainted, collapsed and was not responding)    Negative: Shock suspected (e.g., cold/pale/clammy skin, too weak to stand, low BP, rapid pulse)    Negative: Visible sweat on face or sweat is dripping down    Negative: Chest pain    Negative: SEVERE abdominal pain (e.g., excruciating)    Negative: Pain lasting > 10 minutes and over 50 years old    Negative: Pain lasting > 10 minutes and over 40 years old and associated chest, arm, neck, upper back, or jaw pain    Negative: Pain lasting > 10 minutes and over 35 years old and at least one cardiac risk factor (i.e., hypertension, diabetes, obesity, smoker or strong family history of heart disease)    Negative: Pain lasting > 10 minutes and history of heart disease (i.e., heart attack, bypass surgery, angina, angioplasty, CHF)    Negative: Recent injury to  "the abdomen    Negative: Vomiting red blood or black (coffee ground) material    Negative: Bloody, black, or tarry bowel movements  (Exception: Chronic-unchanged black-grey bowel movements and is taking iron pills or Pepto-Bismol.)    Negative: Pregnant 20 weeks or more and new hand or face swelling    Negative: Constant abdominal pain lasting > 2 hours    Negative: Vomiting bile (green color)    Negative: Patient sounds very sick or weak to the triager    Negative: Vomiting and abdomen looks much more swollen than usual    Negative: White of the eyes have turned yellow (i.e., jaundice)    Negative: Fever > 103 F (39.4 C)    Negative: Fever > 101 F (38.3 C) and over 60 years of age    Negative: Fever > 100.0 F (37.8 C) and has diabetes mellitus or a weak immune system (e.g., HIV positive, cancer chemotherapy, organ transplant, splenectomy, chronic steroids)    Negative: Fever > 100.0 F (37.8 C) and bedridden (e.g., nursing home patient, stroke, chronic illness, recovering from surgery)    Answer Assessment - Initial Assessment Questions  1. LOCATION: \"Where does it hurt?\"       Upper abdomen and down the left side2  2. RADIATION: \"Does the pain shoot anywhere else?\" (e.g., chest, back)      Little bit in the back as well  3. ONSET: \"When did the pain begin?\" (e.g., minutes, hours or days ago)       3 days ago  4. SUDDEN: \"Gradual or sudden onset?\"      Gradual but over the coarse of 1 day  5. PATTERN \"Does the pain come and go, or is it constant?\"     - If constant: \"Is it getting better, staying the same, or worsening?\"       (Note: Constant means the pain never goes away completely; most serious pain is constant and it progresses)      - If intermittent: \"How long does it last?\" \"Do you have pain now?\"      (Note: Intermittent means the pain goes away completely between bouts)      Constant when moving, not present when sitting still  6. SEVERITY: \"How bad is the pain?\"  (e.g., Scale 1-10; mild, moderate, or " "severe)     - MILD (1-3): doesn't interfere with normal activities, abdomen soft and not tender to touch      - MODERATE (4-7): interferes with normal activities or awakens from sleep, abdomen tender to touch      - SEVERE (8-10): excruciating pain, doubled over, unable to do any normal activities        3/10 when moving, 8/10 when moving  7. RECURRENT SYMPTOM: \"Have you ever had this type of stomach pain before?\" If Yes, ask: \"When was the last time?\" and \"What happened that time?\"       Yes, diverticulitis in the past, (September)   8. AGGRAVATING FACTORS: \"Does anything seem to cause this pain?\" (e.g., foods, stress, alcohol)      Foods possibly, have not had any changes in diet  9. CARDIAC SYMPTOMS: \"Do you have any of the following symptoms: chest pain, difficulty breathing, sweating, nausea?\"      Nausea  10. OTHER SYMPTOMS: \"Do you have any other symptoms?\" (e.g., back pain, diarrhea, fever, urination pain, vomiting)        Back pain, nausea, fever last night  11. PREGNANCY: \"Is there any chance you are pregnant?\" \"When was your last menstrual period?\"        No    Protocols used: ABDOMINAL PAIN - UPPER-A-OH    Jax Talbert RN on 12/7/2022 at 4:36 PM   "

## 2022-12-08 ENCOUNTER — OFFICE VISIT (OUTPATIENT)
Dept: FAMILY MEDICINE | Facility: CLINIC | Age: 62
End: 2022-12-08
Payer: COMMERCIAL

## 2022-12-08 VITALS
DIASTOLIC BLOOD PRESSURE: 74 MMHG | SYSTOLIC BLOOD PRESSURE: 104 MMHG | HEART RATE: 100 BPM | BODY MASS INDEX: 28.55 KG/M2 | WEIGHT: 188.4 LBS | OXYGEN SATURATION: 99 % | TEMPERATURE: 97.8 F | HEIGHT: 68 IN | RESPIRATION RATE: 14 BRPM

## 2022-12-08 DIAGNOSIS — R10.32 LEFT LOWER QUADRANT PAIN: Primary | ICD-10-CM

## 2022-12-08 DIAGNOSIS — Z12.11 SCREEN FOR COLON CANCER: ICD-10-CM

## 2022-12-08 LAB
ERYTHROCYTE [DISTWIDTH] IN BLOOD BY AUTOMATED COUNT: 14.1 % (ref 10–15)
ERYTHROCYTE [SEDIMENTATION RATE] IN BLOOD BY WESTERGREN METHOD: 17 MM/HR (ref 0–30)
HCT VFR BLD AUTO: 44.2 % (ref 35–47)
HGB BLD-MCNC: 14.4 G/DL (ref 11.7–15.7)
MCH RBC QN AUTO: 30.3 PG (ref 26.5–33)
MCHC RBC AUTO-ENTMCNC: 32.6 G/DL (ref 31.5–36.5)
MCV RBC AUTO: 93 FL (ref 78–100)
PLATELET # BLD AUTO: 313 10E3/UL (ref 150–450)
RBC # BLD AUTO: 4.75 10E6/UL (ref 3.8–5.2)
WBC # BLD AUTO: 3.9 10E3/UL (ref 4–11)

## 2022-12-08 PROCEDURE — 85027 COMPLETE CBC AUTOMATED: CPT | Performed by: PHYSICIAN ASSISTANT

## 2022-12-08 PROCEDURE — 99213 OFFICE O/P EST LOW 20 MIN: CPT | Performed by: PHYSICIAN ASSISTANT

## 2022-12-08 PROCEDURE — 36415 COLL VENOUS BLD VENIPUNCTURE: CPT | Performed by: PHYSICIAN ASSISTANT

## 2022-12-08 PROCEDURE — 85652 RBC SED RATE AUTOMATED: CPT | Performed by: PHYSICIAN ASSISTANT

## 2022-12-08 ASSESSMENT — PAIN SCALES - GENERAL: PAINLEVEL: MILD PAIN (2)

## 2022-12-08 NOTE — PATIENT INSTRUCTIONS
Come back for COVID and flu shot!!                            GI CLEAR LIQUID DIET    The clear liquid diet consists of clear liquids and food items that at room temperature will liquify to clear liquid.  In addition, the foods are free of fat and fiber.      TYPE OF FOOD                  USE ONLY THESE FOODS    Beverages                     Coffee                                Tea                                Decaffeinated Coffee                                Carbonated Beverages    Desserts                      Gelatin, clear                                Fruit Ice                                Popsicle                                                       Fruit & Fruit Juices          Citrus juices, Strained                                Fruit Nectars, Strained                                Apple Juice                                Grape Juice                                Fruit Drinks    Soups                         Broth                                Bouillon                                Consomme                                Meat Stock, Strained                                Vegetable Broth, Strained    Sweets                        Hard Candy, Plain                                Mints                                Sugar    Miscellaneous                 Flavorings                                Salt              !!NO DAIRY PRODUCTS!!

## 2022-12-08 NOTE — PROGRESS NOTES
"  Assessment & Plan     Left lower quadrant pain  No red flags. Do question repeat diverticulitis episode but this does seem to be improving nicely with liquid diet. As were approaching the weekend, I will send in abx for her to use if symptoms worsen, but likely this will continue to fade. She can slowly advance diet.   - CBC with platelets; Future  - ESR: Erythrocyte sedimentation rate; Future  - amoxicillin-clavulanate (AUGMENTIN) 875-125 MG tablet; Take 1 tablet by mouth 2 times daily  - CBC with platelets  - ESR: Erythrocyte sedimentation rate    Screen for colon cancer  Recommend colonoscopy given recent flares  - Colonoscopy Screening  Referral; Future       BMI:   Estimated body mass index is 29.07 kg/m  as calculated from the following:    Height as of this encounter: 1.715 m (5' 7.5\").    Weight as of this encounter: 85.5 kg (188 lb 6.4 oz).         Return in about 1 week (around 12/15/2022) for recheck if symptoms are not improving..    Fermín Larios PA-C  [facility] SHALONDA Rubio is a 62 year old, presenting for the following health issues:  Abdominal Pain      History of Present Illness       Reason for visit:  Diverticulitis flare up    She eats 2-3 servings of fruits and vegetables daily.She consumes 0 sweetened beverage(s) daily.She exercises with enough effort to increase her heart rate 20 to 29 minutes per day.  She exercises with enough effort to increase her heart rate 3 or less days per week.   She is taking medications regularly.     Patient in for abdominal pain states this has happened in the past when experiencing a Diverticulitis flare - her first flare was back in 9/22  Treated with abx and limited foods and improved     More recently she mentions pain that started over the weekend a general abdominal discomfort; the next day felt a constant pressure/binding in the stomach  She remembered what we'd discussed at her last appt so she " "switched to a liquid diet  Symptoms improved but then transitioned to the LLQ - now only the LLQ is irritating  Pressing on the area is not necessarily painful, but irritating  BMs have been regular and normal  Is having nausea no vomiting  Rates pain to be a 3 out of 10 today  No fevers      Review of Systems   Constitutional, HEENT, cardiovascular, pulmonary, gi and gu systems are negative, except as otherwise noted.      Objective    /74 (BP Location: Right arm, Patient Position: Sitting, Cuff Size: Adult Large)   Pulse 100   Temp 97.8  F (36.6  C) (Tympanic)   Resp 14   Ht 1.715 m (5' 7.5\")   Wt 85.5 kg (188 lb 6.4 oz)   LMP  (LMP Unknown)   SpO2 99%   BMI 29.07 kg/m    Body mass index is 29.07 kg/m .  Physical Exam   GENERAL: healthy, alert and no distress  RESP: lungs clear to auscultation - no rales, rhonchi or wheezes  CV: regular rate and rhythm, normal S1 S2, no S3 or S4, no murmur, click or rub, no peripheral edema and peripheral pulses strong  ABDOMEN: very mild tenderness LLQ, bowel sounds normal and no palpable or pulsatile masses  MS: No peripheral edema   PSYCH: mentation appears normal, affect normal/bright                    "

## 2022-12-13 ENCOUNTER — MYC REFILL (OUTPATIENT)
Dept: FAMILY MEDICINE | Facility: CLINIC | Age: 62
End: 2022-12-13

## 2022-12-13 DIAGNOSIS — F98.8 ATTENTION DEFICIT DISORDER (ADD) WITHOUT HYPERACTIVITY: ICD-10-CM

## 2022-12-13 RX ORDER — DEXTROAMPHETAMINE SACCHARATE, AMPHETAMINE ASPARTATE, DEXTROAMPHETAMINE SULFATE AND AMPHETAMINE SULFATE 5; 5; 5; 5 MG/1; MG/1; MG/1; MG/1
20 TABLET ORAL 2 TIMES DAILY
Qty: 60 TABLET | Refills: 0 | Status: SHIPPED | OUTPATIENT
Start: 2022-12-13 | End: 2023-04-13

## 2022-12-15 ENCOUNTER — HOSPITAL ENCOUNTER (OUTPATIENT)
Dept: OCCUPATIONAL THERAPY | Facility: CLINIC | Age: 62
Setting detail: THERAPIES SERIES
Discharge: HOME | End: 2022-12-15
Attending: OTOLARYNGOLOGY
Payer: COMMERCIAL

## 2022-12-15 PROCEDURE — 97535 SELF CARE MNGMENT TRAINING: CPT | Mod: GO

## 2022-12-15 PROCEDURE — 97110 THERAPEUTIC EXERCISES: CPT | Mod: GO

## 2022-12-23 ENCOUNTER — HOSPITAL ENCOUNTER (OUTPATIENT)
Dept: OCCUPATIONAL THERAPY | Facility: CLINIC | Age: 62
Setting detail: THERAPIES SERIES
Discharge: HOME | End: 2022-12-23
Attending: OTOLARYNGOLOGY
Payer: COMMERCIAL

## 2022-12-23 PROCEDURE — 97535 SELF CARE MNGMENT TRAINING: CPT | Mod: GO

## 2022-12-30 ENCOUNTER — HOSPITAL ENCOUNTER (OUTPATIENT)
Dept: OCCUPATIONAL THERAPY | Facility: CLINIC | Age: 62
Setting detail: THERAPIES SERIES
Discharge: HOME | End: 2022-12-30
Attending: OTOLARYNGOLOGY
Payer: COMMERCIAL

## 2022-12-30 PROCEDURE — 97535 SELF CARE MNGMENT TRAINING: CPT | Mod: GO

## 2023-01-10 ENCOUNTER — HOSPITAL ENCOUNTER (OUTPATIENT)
Facility: CLINIC | Age: 63
End: 2023-01-10
Attending: SURGERY | Admitting: SURGERY

## 2023-01-10 ENCOUNTER — TELEPHONE (OUTPATIENT)
Dept: GASTROENTEROLOGY | Facility: CLINIC | Age: 63
End: 2023-01-10

## 2023-01-10 DIAGNOSIS — Z12.11 SPECIAL SCREENING FOR MALIGNANT NEOPLASMS, COLON: Primary | ICD-10-CM

## 2023-01-10 NOTE — TELEPHONE ENCOUNTER
"    Screening Questions  BLUE  KIND OF PREP RED  LOCATION [review exclusion criteria] GREEN  SEDATION TYPE        y Are you active on mychart?       Larios Ordering/Referring Provider?        Fredy What type of coverage do you have?      n Have you had a positive covid test in the last 14 days?     28.9 1. BMI  [BMI 40+ - review exclusion criteria]    y  2. Are you able to give consent for your medical care? [IF NO,RN REVIEW]          n  3. Are you taking any prescription pain medications on a routine schedule   (ex narcotics: tramadol, oxycodone, roxicodone, oxycontin,  and percocet)?        n  3a. EXTENDED PREP What kind of prescription?     n 4. Do you have any chemical dependencies such as alcohol, street drugs, or methadone?        **If yes 3- 5 , please schedule with MAC sedation.**          IF YES TO ANY 6 - 10 - HOSPITAL SETTING ONLY.     n 6.   Do you need assistance transferring?     n 7.   Have you had a heart or lung transplant?    n 8.   Are you currently on dialysis?   n 9.   Do you use daily home oxygen?   n 10. Do you take nitroglycerin?   10a. n If yes, how often?     11. [FEMALES]  n Are you currently pregnant?    11a. n If yes, how many weeks? [ Greater than 12 weeks, OR NEEDED]    n 12. Do you have Pulmonary Hypertension? *NEED PAC APPT AT [facility]*     n 13. [review exclusion criteria]  Do you have any implantable devices in your body (pacemaker, defib, LVAD)?    n 14. In the past 6 months, have you had any heart related issues including cardiomyopathy or heart attack?     14a. n If yes, did it require cardiac stenting if so when?     n 15. Have you had a stroke or Transient ischemic attack (TIA - aka  mini stroke ) within 6 months?      n 16. Do you have mod to severe Obstructive Sleep Apnea?  [Hospital only]    n 17. Do you have SEVERE AND UNCONTROLLED asthma? *NEED PAC APPT AT [facility]*     n 18. Are you currently taking any blood thinners?     18a. If yes, inform patient to \"follow up w/ ordering " "provider for bridging instructions.\"    n 19. Do you take the medication Phentermine?    19a. If yes, \"Hold for 7 days before procedure.  Please consult your prescribing provider if you have questions about holding this medication.\"     n  20. Do you have chronic kidney disease?      n  21. Do you have a diagnosis of diabetes?     n  22. On a regular basis do you go 3-5 days between bowel movements?      23. Preferred LOCAL Pharmacy for Pre Prescription    [ LIST ONLY ONE PHARMACY]          [facility] [address]          - CLOSING REMINDERS -    Informed patient they will need an adult    Cannot take any type of public or medical transportation alone    Conscious Sedation- Needs  for 6 hours after the procedure       MAC/General-Needs  for 24 hours after procedure    Pre-Procedure Covid test to be completed [[facility] PCR Testing Required]    Confirmed Nurse will call to complete assessment       - SCHEDULING DETAILS -  n Hospital Setting Required? If yes, what is the exclusion?:    [place]  Surgeon    4/3  Date of Procedure  Lower Endoscopy [Colonoscopy]  Type of Procedure Scheduled  [facility]-If you answer yes to questions #8, #20, #21Which Colonoscopy Prep was Sent?     CS Sedation Type     n PAC / Pre-op Required                 "

## 2023-01-11 ENCOUNTER — MYC REFILL (OUTPATIENT)
Dept: FAMILY MEDICINE | Facility: CLINIC | Age: 63
End: 2023-01-11

## 2023-01-11 DIAGNOSIS — F98.8 ATTENTION DEFICIT DISORDER (ADD) WITHOUT HYPERACTIVITY: ICD-10-CM

## 2023-01-11 RX ORDER — DEXTROAMPHETAMINE SACCHARATE, AMPHETAMINE ASPARTATE, DEXTROAMPHETAMINE SULFATE AND AMPHETAMINE SULFATE 5; 5; 5; 5 MG/1; MG/1; MG/1; MG/1
20 TABLET ORAL 2 TIMES DAILY
Qty: 60 TABLET | Refills: 0 | Status: CANCELLED | OUTPATIENT
Start: 2023-01-11

## 2023-01-11 RX ORDER — DEXTROAMPHETAMINE SACCHARATE, AMPHETAMINE ASPARTATE, DEXTROAMPHETAMINE SULFATE AND AMPHETAMINE SULFATE 5; 5; 5; 5 MG/1; MG/1; MG/1; MG/1
20 TABLET ORAL 2 TIMES DAILY
Qty: 60 TABLET | Refills: 0 | Status: SHIPPED | OUTPATIENT
Start: 2023-01-11 | End: 2023-02-10

## 2023-01-11 RX ORDER — DEXTROAMPHETAMINE SACCHARATE, AMPHETAMINE ASPARTATE, DEXTROAMPHETAMINE SULFATE AND AMPHETAMINE SULFATE 5; 5; 5; 5 MG/1; MG/1; MG/1; MG/1
20 TABLET ORAL 2 TIMES DAILY
Qty: 60 TABLET | Refills: 0 | Status: SHIPPED | OUTPATIENT
Start: 2023-02-11 | End: 2023-03-13

## 2023-01-11 RX ORDER — DEXTROAMPHETAMINE SACCHARATE, AMPHETAMINE ASPARTATE, DEXTROAMPHETAMINE SULFATE AND AMPHETAMINE SULFATE 5; 5; 5; 5 MG/1; MG/1; MG/1; MG/1
20 TABLET ORAL 2 TIMES DAILY
Qty: 60 TABLET | Refills: 0 | Status: SHIPPED | OUTPATIENT
Start: 2023-03-14 | End: 2023-04-13

## 2023-01-11 ASSESSMENT — ANXIETY QUESTIONNAIRES
GAD7 TOTAL SCORE: 8
1. FEELING NERVOUS, ANXIOUS, OR ON EDGE: SEVERAL DAYS
3. WORRYING TOO MUCH ABOUT DIFFERENT THINGS: MORE THAN HALF THE DAYS
8. IF YOU CHECKED OFF ANY PROBLEMS, HOW DIFFICULT HAVE THESE MADE IT FOR YOU TO DO YOUR WORK, TAKE CARE OF THINGS AT HOME, OR GET ALONG WITH OTHER PEOPLE?: EXTREMELY DIFFICULT
GAD7 TOTAL SCORE: 8
7. FEELING AFRAID AS IF SOMETHING AWFUL MIGHT HAPPEN: MORE THAN HALF THE DAYS
GAD7 TOTAL SCORE: 8
6. BECOMING EASILY ANNOYED OR IRRITABLE: NOT AT ALL
5. BEING SO RESTLESS THAT IT IS HARD TO SIT STILL: NOT AT ALL
7. FEELING AFRAID AS IF SOMETHING AWFUL MIGHT HAPPEN: MORE THAN HALF THE DAYS
2. NOT BEING ABLE TO STOP OR CONTROL WORRYING: SEVERAL DAYS
4. TROUBLE RELAXING: MORE THAN HALF THE DAYS
IF YOU CHECKED OFF ANY PROBLEMS ON THIS QUESTIONNAIRE, HOW DIFFICULT HAVE THESE PROBLEMS MADE IT FOR YOU TO DO YOUR WORK, TAKE CARE OF THINGS AT HOME, OR GET ALONG WITH OTHER PEOPLE: EXTREMELY DIFFICULT

## 2023-01-11 NOTE — TELEPHONE ENCOUNTER
Routing refill request to provider for review/approval because:  Drug not on the [facility] refill protocol     Jose IGNACIO RN 1/11/2023 at 3:13 PM

## 2023-01-16 ENCOUNTER — OFFICE VISIT (OUTPATIENT)
Dept: PHYSICAL MEDICINE AND REHAB | Facility: CLINIC | Age: 63
End: 2023-01-16

## 2023-01-16 VITALS — SYSTOLIC BLOOD PRESSURE: 122 MMHG | HEART RATE: 103 BPM | RESPIRATION RATE: 16 BRPM | DIASTOLIC BLOOD PRESSURE: 82 MMHG

## 2023-01-16 DIAGNOSIS — S09.90XS FATIGUE DUE TO OLD HEAD INJURY: ICD-10-CM

## 2023-01-16 DIAGNOSIS — M54.2 CERVICAL PAIN (NECK): ICD-10-CM

## 2023-01-16 DIAGNOSIS — F07.81 POST CONCUSSION SYNDROME: ICD-10-CM
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
## 2023-01-16 DIAGNOSIS — R53.83 FATIGUE DUE TO OLD HEAD INJURY: ICD-10-CM

## 2023-01-16 DIAGNOSIS — R41.840 ATTENTION AND CONCENTRATION DEFICIT: ICD-10-CM

## 2023-01-16 PROCEDURE — 99205 OFFICE O/P NEW HI 60 MIN: CPT | Performed by: PHYSICAL MEDICINE & REHABILITATION

## 2023-01-16 ASSESSMENT — PAIN SCALES - GENERAL: PAINLEVEL: SEVERE PAIN (7)

## 2023-01-16 NOTE — PROGRESS NOTES
PM[facility]R Clinic Note     Patient Name: Joanne Padilla : 1960 Medical Record: [id number]     Requesting Physician/clinician: No att. providers found           History of Present Illness:     Joanne Padilla is a 62 year old female that per records and reporting patient fall on the ice at the end of 2021.   Reports a bad fall on ice at the end of 2021- feels like it 'knocked the sense out of me' and provoked this dizziness.  Has been seeing Neurology for post traumatic headaches. Has also been participating in concussion therapy program last several months.  Here today for follow-up.       Symptoms  CONCUSSION SYMPTOMS ASSESSMENT 2022   Headache or Pressure In Head 6 - excruciating 4 - moderate to severe 5 - severe   Upset Stomach or Throwing Up 5 - severe 5 - severe 5 - severe   Problems with Balance 5 - severe 4 - moderate to severe 3 - moderate   Feeling Dizzy 5 - severe 5 - severe 5 - severe   Sensitivity to Light 5 - severe 6 - excruciating 4 - moderate to severe   Sensitivity to Noise 5 - severe 6 - excruciating 4 - moderate to severe   Mood Changes 4 - moderate to severe 3 - moderate 3 - moderate   Feeling sluggish, hazy, or foggy 6 - excruciating 5 - severe 5 - severe   Trouble Concentrating, Lack of Focus 5 - severe 5 - severe 5 - severe   Motion Sickness 3 - moderate 3 - moderate 3 - moderate   Vision Changes 6 - excruciating 5 - severe 4 - moderate to severe   Memory Problems 5 - severe 4 - moderate to severe 5 - severe   Feeling Confused 5 - severe 4 - moderate to severe 3 - moderate   Neck Pain 5 - severe 4 - moderate to severe 5 - severe   Trouble Sleeping 6 - excruciating 6 - excruciating 6 - excruciating   Total Number of Symptoms 15 15 15   Symptom Severity Score 76 69 65       R neck tight at times, locks up at times.    About 15 headaches a month she states  And seem to be getting better.    Poor sleep and exercise.    Follow-up with Rheum  team this month she states.    Denies issues with bowel or bladder.              Past Medical and Surgical History:     Past Medical History:   Diagnosis Date     Allergic rhinitis, cause unspecified      Anxiety state, unspecified      Atopic dermatitis 10/10/2011     Cardiac dysrhythmia, unspecified      Chronic rhinitis 10/10/2011     Colitis      Exhaustion due to excessive exertion(994.5)      Gallstones      Hypercholesterolemia      Insomnia, unspecified      Major depressive disorder, single episode, unspecified      Migraines 2012     Osteoarthritis of CMC joint of thumb      Other acne      Other malaise and fatigue      Palpitations      PONV (postoperative nausea and vomiting)      Uncomplicated asthma     Occasional     Past Surgical History:   Procedure Laterality Date     BIOPSY      breast lumps      SECTION       COLONOSCOPY       DAVINCI LAPAROSCOPIC CHOLECYSTECTOMY WITHOUT GRAMS  2013    Procedure: DAVINCI LAPAROSCOPIC CHOLECYSTECTOMY WITHOUT GRAMS;  DAVINCI CHOLECYSTECTOMY;  Surgeon: Jac Stallings MD;  Location: [facility] OR     ENT SURGERY      deviated septum, blocked nasal passage     GYN SURGERY      repair of fallopian tubes and ovaries     LUMPECTOMY BREAST       LUMPECTOMY BREAST       NOSE SURGERY              Social History:     Social History     Tobacco Use     Smoking status: Every Day     Packs/day: 0.50     Years: 15.00     Pack years: 7.50     Types: Cigarettes     Smokeless tobacco: Former     Quit date: 10/1/2021     Tobacco comments:     has her chantix   Substance Use Topics     Alcohol use: No              Functional history:     Joanne Padilla is independent with all aspects of life.    ADLs: I  Assistive devices: none   iADLs (medication management and finances): I  Hand dominance: R   Driving: as tolerated            Family History:     Family History   Problem Relation Age of Onset     C.A.D. Mother         MI early 50s;  59 heart problems      Breast Cancer Mother         Cancer Unknown Primary     Other Cancer Mother      Anesthesia Reaction Mother      C.A.D. Father      Diabetes Father      Breast Cancer Maternal Grandmother         Cancer Unknown Primary     Anxiety Disorder Sister      Thyroid Disease Paternal Grandmother             Medications:     Current Outpatient Medications   Medication Sig Dispense Refill     albuterol (PROAIR HFA/PROVENTIL HFA/VENTOLIN HFA) 108 (90 Base) MCG/ACT inhaler INHALE 2 PUFFS INTO THE LUNGS EVERY 6 HOURS AS NEEDED FOR SHORTNESS OF BREATH / DYSPNEA OR WHEEZING 8.5 g 1     amphetamine-dextroamphetamine (ADDERALL) 20 MG tablet Take 1 tablet (20 mg) by mouth 2 times daily for 30 days 60 tablet 0     [START ON 2/11/2023] amphetamine-dextroamphetamine (ADDERALL) 20 MG tablet Take 1 tablet (20 mg) by mouth 2 times daily for 30 days 60 tablet 0     [START ON 3/14/2023] amphetamine-dextroamphetamine (ADDERALL) 20 MG tablet Take 1 tablet (20 mg) by mouth 2 times daily for 30 days 60 tablet 0     amphetamine-dextroamphetamine (ADDERALL) 20 MG tablet Take 1 tablet (20 mg) by mouth 2 times daily 60 tablet 0     amphetamine-dextroamphetamine (ADDERALL) 20 MG tablet TAKE ONE TABLET BY MOUTH TWICE A DAY 60 tablet 0     aspirin (ASA) 81 MG EC tablet Take 1 tablet (81 mg) by mouth daily 90 tablet 3     atorvastatin (LIPITOR) 20 MG tablet Take 1 tablet (20 mg) by mouth daily 90 tablet 1     cholecalciferol 25 MCG (1000 UT) TABS Take 1,000 Units by mouth       Cyanocobalamin (VITAMIN B12 PO)        Multiple Vitamins-Minerals (MULTIVITAMIN ADULT PO) Take by mouth daily       amoxicillin-clavulanate (AUGMENTIN) 875-125 MG tablet Take 1 tablet by mouth 2 times daily (Patient not taking: Reported on 1/16/2023) 20 tablet 0     naproxen (NAPROSYN) 500 MG tablet Take 1 tablet (500 mg) by mouth 2 times daily as needed for headaches (Patient not taking: Reported on 1/16/2023) 28 tablet 3     nicotine (NICODERM CQ) 14 MG/24HR 24 hr patch Place 1  "patch onto the skin every 24 hours (Patient not taking: Reported on 1/16/2023) 30 patch 0     nicotine (NICODERM CQ) 21 MG/24HR 24 hr patch Place 1 patch onto the skin every 24 hours (Patient not taking: Reported on 1/16/2023) 30 patch 0     ondansetron (ZOFRAN ODT) 4 MG ODT tab Take 1 tablet (4 mg) by mouth every 8 hours as needed for nausea (Patient not taking: Reported on 1/16/2023) 20 tablet 3     rizatriptan (MAXALT-MLT) 5 MG ODT Take 1 tablet (5 mg) by mouth at onset of headache for migraine (repeat in 2 hours if needed) May repeat in 2 hours. Max 3 tablets/24 hours. (Patient not taking: Reported on 1/16/2023) 18 tablet 3            Allergies:     Allergies   Allergen Reactions     Codeine Nausea     Dust Mites      Latex               ROS:        ROS: 10 point ROS neg other than the symptoms noted above in the HPI.             Physical Examiniation:     VITAL SIGNS: /82   Pulse 103   Resp 16   LMP  (LMP Unknown)   BMI: Estimated body mass index is 29.07 kg/m  as calculated from the following:    Height as of 12/8/22: 1.715 m (5' 7.5\").    Weight as of 12/8/22: 85.5 kg (188 lb 6.4 oz).    Gen: NAD, pleasant and cooperative   HEENT: NCAT, EOMI, no nystagmus, GABRIEL, there is no reproducible headache and eye strain with VOMS. some taut and tender cervical paraspinal muscles, no facial asymmetry   Cardio: 2+ radial pulse, well perfused  Pulm: non-labored breathing in room air, symmetrical chest rise  Abd: benign  Ext: WWP, no edema in BLE, no tenderness in calves  Neuro/MSK: 5/5 in c5-t1 and l2-s1 myotomes, SILT, negative skaggs's b/l, CN 2-12 intact, AAOx3.  GAIT: WNFL               Laboratory/Imaging:     EXAM: MR BRAIN W/O and W CONTRAST, MRV BRAIN  W/O and W CONTRAST  LOCATION: [facility]  DATE/TIME: 10/29/2022 2:06 PM     INDICATION: Headache; r/o increased ICP or papilledema; Increased pain in the morning; No hypercoagulable state, known or r/o; No known automatically " detected potential contraindications to imaging.  COMPARISON: MR venogram. 10/29/2022, CT sinus examination 06/27/2022.  CONTRAST: 10 mL Gadavist.  TECHNIQUE: Routine multiplanar multisequence head MRI without and with intravenous contrast.     MRI BRAIN WITHOUT AND WITH CONTRAST  FINDINGS:  INTRACRANIAL CONTENTS: There is no evidence for acute or subacute infarction. Nothing for restricted diffusion abnormality. Homogeneously enhancing extra-axial mass at the anterior cranial fossa in midline, dural based, measures 16 mm at the site of   dural attachment, 8 mm SI x 11 mm ML compatible with a planum sphenoidale meningioma. There is only minimal mass effect without edema at the paramedian inferior frontal lobe level. This is not seen on prior CT without contrast of the sinuses 06/27/2022   but was almost certainly present on that examination. No edema of the adjacent parenchyma. No hemorrhage. No calcification. Scattered nonspecific T2/FLAIR hyperintensities within the cerebral white matter most consistent with mild chronic microvascular   ischemic change. Normal ventricles and sulci for age. Normal position of the cerebellar tonsils. Postcontrast imaging shows no additional abnormal enhancement.     SELLA: No abnormality accounting for technique.     OSSEOUS STRUCTURES/SOFT TISSUES: Normal marrow signal. The major intracranial vascular flow voids are maintained.      ORBITS: No abnormality accounting for technique. No pathologic orbital enhancement. Nothing for orbital inflammatory process.     SINUSES/MASTOIDS: There is partial opacification left ethmoid air cells. No air-fluid levels. Trace fluid in the mastoid air cells bilaterally.                                                                      IMPRESSION:  1.  Nothing for acute or evolving infarction.     2.  No acute or chronic hemorrhage.     3.  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 Minimal mass effect without edema at the paramedian inferior frontal lobes.     4.  No evidence for additional mass or pathologic enhancement.     5.  Minimal chronic ischemic change deep white matter both cerebral hemispheres.     6.  Less significant details and full description are provided above.           Assessment/Plan:     Joanne was seen today for head injury.    Diagnoses and all orders for this visit:    Fall, initial encounter    Attention and concentration deficit    Post concussion syndrome  -     Adult Mental Health  Referral; Future  -     Concussion  Referral; Future    Fatigue due to old head injury  -     Concussion  Referral; Future    Cervical pain (neck)            1. Patient education: In depth discussion and education was provided about the assessment and implications of each of the below recommendations for management. Patient indicated readiness to learn, all questions were answered and understanding of material presented was confirmed.    2. Work-up: no additions     3. Therapy/equipment/braces: cont outpatient therapy program    4. Medications: no additions     5. Interventions:  Discussed sleep hygiene and brain health, as well as exercise and brain health.    6. Referral / follow up with other providers:  PCP, Neurology, psychology     7. Follow up: 3 months for progress.           You Devries, DO  Physical Medicine & Rehabilitation    I spent a total of 65 minutes face-to-face with Joanne Padilla during today's office visit. Over 50% of this time was spent counseling the patient and/or coordinating care. See note for details.     65 minutes spent on the date of the encounter doing chart review, history and exam, documentation and further activities as noted above          Answers for HPI/ROS submitted by the patient on 1/11/2023  DIEGO 7 TOTAL SCORE: 8

## 2023-01-16 NOTE — LETTER
2023       RE: Joanne Padilla  [address]  [facility] [address]     Dear Colleague,    Thank you for referring your patient, Joanne Padilla, to the [facility] [place] [facility]. Please see a copy of my visit note below.           PM&R Clinic Note     Patient Name: Joanne Padilla : 1960 Medical Record: [id number]     Requesting Physician/clinician: No att. providers found           History of Present Illness:     Joanne Padilla is a 62 year old female that per records and reporting patient fall on the ice at the end of 2021.   Reports a bad fall on ice at the end of 2021- feels like it 'knocked the sense out of me' and provoked this dizziness.  Has been seeing Neurology for post traumatic headaches. Has also been participating in concussion therapy program last several months.  Here today for follow-up.       Symptoms  CONCUSSION SYMPTOMS ASSESSMENT 2022   Headache or Pressure In Head 6 - excruciating 4 - moderate to severe 5 - severe   Upset Stomach or Throwing Up 5 - severe 5 - severe 5 - severe   Problems with Balance 5 - severe 4 - moderate to severe 3 - moderate   Feeling Dizzy 5 - severe 5 - severe 5 - severe   Sensitivity to Light 5 - severe 6 - excruciating 4 - moderate to severe   Sensitivity to Noise 5 - severe 6 - excruciating 4 - moderate to severe   Mood Changes 4 - moderate to severe 3 - moderate 3 - moderate   Feeling sluggish, hazy, or foggy 6 - excruciating 5 - severe 5 - severe   Trouble Concentrating, Lack of Focus 5 - severe 5 - severe 5 - severe   Motion Sickness 3 - moderate 3 - moderate 3 - moderate   Vision Changes 6 - excruciating 5 - severe 4 - moderate to severe   Memory Problems 5 - severe 4 - moderate to severe 5 - severe   Feeling Confused 5 - severe 4 - moderate to severe 3 - moderate   Neck Pain 5 - severe 4 - moderate to severe 5 -  severe   Trouble Sleeping 6 - excruciating 6 - excruciating 6 - excruciating   Total Number of Symptoms 15 15 15   Symptom Severity Score 76 69 65       R neck tight at times, locks up at times.    About 15 headaches a month she states  And seem to be getting better.    Poor sleep and exercise.    Follow-up with Rheum team this month she states.    Denies issues with bowel or bladder.              Past Medical and Surgical History:     Past Medical History:   Diagnosis Date     Allergic rhinitis, cause unspecified      Anxiety state, unspecified      Atopic dermatitis 10/10/2011     Cardiac dysrhythmia, unspecified      Chronic rhinitis 10/10/2011     Colitis      Exhaustion due to excessive exertion(994.5)      Gallstones      Hypercholesterolemia      Insomnia, unspecified      Major depressive disorder, single episode, unspecified      Migraines 2012     Osteoarthritis of CMC joint of thumb      Other acne      Other malaise and fatigue      Palpitations      PONV (postoperative nausea and vomiting)      Uncomplicated asthma     Occasional     Past Surgical History:   Procedure Laterality Date     BIOPSY      breast lumps      SECTION       COLONOSCOPY       DAVINCI LAPAROSCOPIC CHOLECYSTECTOMY WITHOUT GRAMS  2013    Procedure: DAVINCI LAPAROSCOPIC CHOLECYSTECTOMY WITHOUT GRAMS;  DAVINCI CHOLECYSTECTOMY;  Surgeon: Jac Stallings MD;  Location: [facility] OR     ENT SURGERY      deviated septum, blocked nasal passage     GYN SURGERY      repair of fallopian tubes and ovaries     LUMPECTOMY BREAST       LUMPECTOMY BREAST       NOSE SURGERY              Social History:     Social History     Tobacco Use     Smoking status: Every Day     Packs/day: 0.50     Years: 15.00     Pack years: 7.50     Types: Cigarettes     Smokeless tobacco: Former     Quit date: 10/1/2021     Tobacco comments:     has her chantix   Substance Use Topics     Alcohol use: No              Functional history:     Joanne LEON  Valerie is independent with all aspects of life.    ADLs: I  Assistive devices: none   iADLs (medication management and finances): I  Hand dominance: R   Driving: as tolerated            Family History:     Family History   Problem Relation Age of Onset     C.A.D. Mother         MI early 50s;  59 heart problems     Breast Cancer Mother         Cancer Unknown Primary     Other Cancer Mother      Anesthesia Reaction Mother      C.A.D. Father      Diabetes Father      Breast Cancer Maternal Grandmother         Cancer Unknown Primary     Anxiety Disorder Sister      Thyroid Disease Paternal Grandmother             Medications:     Current Outpatient Medications   Medication Sig Dispense Refill     albuterol (PROAIR HFA/PROVENTIL HFA/VENTOLIN HFA) 108 (90 Base) MCG/ACT inhaler INHALE 2 PUFFS INTO THE LUNGS EVERY 6 HOURS AS NEEDED FOR SHORTNESS OF BREATH / DYSPNEA OR WHEEZING 8.5 g 1     amphetamine-dextroamphetamine (ADDERALL) 20 MG tablet Take 1 tablet (20 mg) by mouth 2 times daily for 30 days 60 tablet 0     [START ON 2023] amphetamine-dextroamphetamine (ADDERALL) 20 MG tablet Take 1 tablet (20 mg) by mouth 2 times daily for 30 days 60 tablet 0     [START ON 3/14/2023] amphetamine-dextroamphetamine (ADDERALL) 20 MG tablet Take 1 tablet (20 mg) by mouth 2 times daily for 30 days 60 tablet 0     amphetamine-dextroamphetamine (ADDERALL) 20 MG tablet Take 1 tablet (20 mg) by mouth 2 times daily 60 tablet 0     amphetamine-dextroamphetamine (ADDERALL) 20 MG tablet TAKE ONE TABLET BY MOUTH TWICE A DAY 60 tablet 0     aspirin (ASA) 81 MG EC tablet Take 1 tablet (81 mg) by mouth daily 90 tablet 3     atorvastatin (LIPITOR) 20 MG tablet Take 1 tablet (20 mg) by mouth daily 90 tablet 1     cholecalciferol 25 MCG (1000 UT) TABS Take 1,000 Units by mouth       Cyanocobalamin (VITAMIN B12 PO)        Multiple Vitamins-Minerals (MULTIVITAMIN ADULT PO) Take by mouth daily       amoxicillin-clavulanate (AUGMENTIN) 875-125 MG  "tablet Take 1 tablet by mouth 2 times daily (Patient not taking: Reported on 1/16/2023) 20 tablet 0     naproxen (NAPROSYN) 500 MG tablet Take 1 tablet (500 mg) by mouth 2 times daily as needed for headaches (Patient not taking: Reported on 1/16/2023) 28 tablet 3     nicotine (NICODERM CQ) 14 MG/24HR 24 hr patch Place 1 patch onto the skin every 24 hours (Patient not taking: Reported on 1/16/2023) 30 patch 0     nicotine (NICODERM CQ) 21 MG/24HR 24 hr patch Place 1 patch onto the skin every 24 hours (Patient not taking: Reported on 1/16/2023) 30 patch 0     ondansetron (ZOFRAN ODT) 4 MG ODT tab Take 1 tablet (4 mg) by mouth every 8 hours as needed for nausea (Patient not taking: Reported on 1/16/2023) 20 tablet 3     rizatriptan (MAXALT-MLT) 5 MG ODT Take 1 tablet (5 mg) by mouth at onset of headache for migraine (repeat in 2 hours if needed) May repeat in 2 hours. Max 3 tablets/24 hours. (Patient not taking: Reported on 1/16/2023) 18 tablet 3            Allergies:     Allergies   Allergen Reactions     Codeine Nausea     Dust Mites      Latex               ROS:        ROS: 10 point ROS neg other than the symptoms noted above in the HPI.             Physical Examiniation:     VITAL SIGNS: /82   Pulse 103   Resp 16   LMP  (LMP Unknown)   BMI: Estimated body mass index is 29.07 kg/m  as calculated from the following:    Height as of 12/8/22: 1.715 m (5' 7.5\").    Weight as of 12/8/22: 85.5 kg (188 lb 6.4 oz).    Gen: NAD, pleasant and cooperative   HEENT: NCAT, EOMI, no nystagmus, GABRIEL, there is no reproducible headache and eye strain with VOMS. some taut and tender cervical paraspinal muscles, no facial asymmetry   Cardio: 2+ radial pulse, well perfused  Pulm: non-labored breathing in room air, symmetrical chest rise  Abd: benign  Ext: WWP, no edema in BLE, no tenderness in calves  Neuro/MSK: 5/5 in c5-t1 and l2-s1 myotomes, SILT, negative skaggs's b/l, CN 2-12 intact, AAOx3.  GAIT: WNFL               " Laboratory/Imaging:     EXAM: MR BRAIN W/O and W CONTRAST, MRV BRAIN  W/O and W CONTRAST  LOCATION: [facility]  DATE/TIME: 10/29/2022 2:06 PM     INDICATION: Headache; r/o increased ICP or papilledema; Increased pain in the morning; No hypercoagulable state, known or r/o; No known automatically detected potential contraindications to imaging.  COMPARISON: MR venogram. 10/29/2022, CT sinus examination 06/27/2022.  CONTRAST: 10 mL Gadavist.  TECHNIQUE: Routine multiplanar multisequence head MRI without and with intravenous contrast.     MRI BRAIN WITHOUT AND WITH CONTRAST  FINDINGS:  INTRACRANIAL CONTENTS: There is no evidence for acute or subacute infarction. Nothing for restricted diffusion abnormality. Homogeneously enhancing extra-axial mass at the anterior cranial fossa in midline, dural based, measures 16 mm at the site of   dural attachment, 8 mm SI x 11 mm ML compatible with a planum sphenoidale meningioma. There is only minimal mass effect without edema at the paramedian inferior frontal lobe level. This is not seen on prior CT without contrast of the sinuses 06/27/2022   but was almost certainly present on that examination. No edema of the adjacent parenchyma. No hemorrhage. No calcification. Scattered nonspecific T2/FLAIR hyperintensities within the cerebral white matter most consistent with mild chronic microvascular   ischemic change. Normal ventricles and sulci for age. Normal position of the cerebellar tonsils. Postcontrast imaging shows no additional abnormal enhancement.     SELLA: No abnormality accounting for technique.     OSSEOUS STRUCTURES/SOFT TISSUES: Normal marrow signal. The major intracranial vascular flow voids are maintained.      ORBITS: No abnormality accounting for technique. No pathologic orbital enhancement. Nothing for orbital inflammatory process.     SINUSES/MASTOIDS: There is partial opacification left ethmoid air cells. No air-fluid levels. Trace fluid in  the mastoid air cells bilaterally.                                                                      IMPRESSION:  1.  Nothing for acute or evolving infarction.     2.  No acute or chronic hemorrhage.     3.  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 Minimal mass effect without edema at the paramedian inferior frontal lobes.     4.  No evidence for additional mass or pathologic enhancement.     5.  Minimal chronic ischemic change deep white matter both cerebral hemispheres.     6.  Less significant details and full description are provided above.           Assessment/Plan:     Joanne was seen today for head injury.    Diagnoses and all orders for this visit:    Fall, initial encounter    Attention and concentration deficit    Post concussion syndrome  -     Adult Mental Health  Referral; Future  -     Concussion  Referral; Future    Fatigue due to old head injury  -     Concussion  Referral; Future    Cervical pain (neck)            1. Patient education: In depth discussion and education was provided about the assessment and implications of each of the below recommendations for management. Patient indicated readiness to learn, all questions were answered and understanding of material presented was confirmed.    2. Work-up: no additions     3. Therapy/equipment/braces: cont outpatient therapy program    4. Medications: no additions     5. Interventions:  Discussed sleep hygiene and brain health, as well as exercise and brain health.    6. Referral / follow up with other providers:  PCP, Neurology, psychology     7. Follow up: 3 months for progress.           You Devries,   Physical Medicine & Rehabilitation    I spent a total of 65 minutes face-to-face with Joanne Padilla during today's office visit. Over 50% of this time was spent counseling the patient and/or  coordinating care. See note for details.     65 minutes spent on the date of the encounter doing chart review, history and exam, documentation and further activities as noted above          Answers for HPI/ROS submitted by the patient on 1/11/2023  DIEGO 7 TOTAL SCORE: 8

## 2023-01-16 NOTE — PATIENT INSTRUCTIONS
"    GENERAL ADVICE:  ~ Gradually ease back into your usual activities.   ~ Rest as needed to help your symptoms go away.  - Consider pairing 50 minutes of activity with 10 minutes of rest.  ~ Allow yourself more time for activities.  ~ Write things down.  At home, at work, whenever there is something that you should remember, even it is simple.  SCREENS:  ~ Change settings on your phone and computer using the \"Blue Light Filter\" (Night Shift on all  Apple products)  ~ The goal is making screens more yellow and less blue.    ~ If this is not an option you can download this program, WeVideo, to adjust your screen resolution.  ~ Logan for various filter and font apps  ~ Turn screen brightness down  ~ Increase font size  ~ Limit screen activities including computer, TV and phones.  NECK PAIN:  ~ Ice or Heat are good~  ~ Massage is ok if it doesn't trigger more symptoms~  ~ Gentle stretches and range-of-motion are helpful.  DIZZINESS:  ~ No driving when dizzy.  ~ No biking, climbing heights or ladders if dizzy.  FATIGUE:  ~ Daily exercise is strongly encouraged.  Start with a 10 min walk and increase the time as tolerated until you are walking 30 minutes per day.    ~ Focus on Good sleep hygiene instead of napping . Your goal should be 8 hours of sleep every night.  ~ Try Melatonin 1 hour before bed  ANXIETY OR MOOD SWINGS:  ~ If you are irritable or anxious, take a break in a quiet room.  ~ Try using the free Calm jessi for guided breathing and mindfulness/meditation.  ~ Explore RealTravel (https://www.headspace.com) for free and easy-to-use meditation guidance.  LIGHT SENSITIVITIES:  ~ Avoid florescent lighting when possible.  ~Yellow or kasandra tinted lenses may help reduce computer or night-time road glare.             ~ Amazon has a $10.00 option: [employer]s yellow Night Vision.  NOISE SENSITIVITIES:  ~ Try listening to calming sounds such as the \"Calm Jessi\" to help shift your focus off of more irritating " sounds.  ~ Avoid crowded areas at first then slowly introduce yourself to small increments of crowded, noisy areas.  ~ Try High fidelity earplugs used by Musicians. Etymotic ETY-Plugs, can be found on Amazon for $13.00.  DIET:  - In principle incorporate more protein, lots of veggies, some fruit, whole grains.    - Less sweets and saturated fat.   - Mediterranean Diet is an easy-to-follow example.  ~ Drink plenty of water throughout the day (8-10 glasses per day)    PM&R / [facility]   Phone # [phone]  Fax # [phone]        Thank you for allowing us to be a part of your care.

## 2023-01-19 ENCOUNTER — HOSPITAL ENCOUNTER (OUTPATIENT)
Dept: OCCUPATIONAL THERAPY | Facility: CLINIC | Age: 63
Setting detail: THERAPIES SERIES
Discharge: HOME | End: 2023-01-19
Attending: OTOLARYNGOLOGY

## 2023-01-19 PROCEDURE — 97535 SELF CARE MNGMENT TRAINING: CPT | Mod: GO

## 2023-01-19 NOTE — PROGRESS NOTES
"[facility]    Outpatient Occupational Therapy Progress Note  Patient: Joanne Padilla  : 1960    Beginning/End Dates of Reporting Period:  10/27/22 to 23    Referring Provider: Dominik Peter MD    Therapy Diagnosis: Decreased IND with ADLs/IADLs    Client Self Report: Pt reports the last 3 weeks since her last OT appointment were \"not good.\" Reports she feels she is drowning as she cannot complete the same level of activities she has done in the past, yet the stressors in her life are forcing her to try to do those activities despite her needing to rest. Pt reports feeling very overwhelmed right now and even more overwhelmed when presented with insurance issue at the  today.    Objective Measurements:     Objective Measure: CAM   Details: As of 22 - SEVERE impairment in temporal awareness (estimates 10 minutes when 30 minutes had passed). MODERATE impairment in visual memory/sequencing forward (4/7) and backward (3/7). MILD impairment in auditory recall/recognition (2.5/3), auditory memory/sequencing forward (6/7) and backward (4/7), coin recognition (half dollar). NO ipmairment in attention span, remote and recent memory/orientation, following directions (yes/no, one-step verbal, written directions), immediate memory (auditory and motor), matching, object identification, motor recall/recognition, mental manipulation of money, simple math skills (single and multiple digit), foresight/planning, and safety/judgment.            Outcome Measures (most recent score):  Concussion Symptom Assessment (score out of 90). A higher score indicates greater impairment: As of 23 - 15 symptoms, 90 severity (likely impacted by patient's mood this date, however endorses heightened symptoms with stress over the last couple weeks)    Goals:     Goal Identifier Symptom Management   Goal " "Description Pt will identify and use 3 symptom management strategies (computer adaptations, self-awareness and self-management of symptoms, use of adaptive techniques, Safe and Sound protocol, etc.) to promote ADL/IADL completion (e.g. reading, exercising, work, sleep).   Target Date 04/13/23   Date Met      Progress (detail required for progress note):  Goal progressing. Pt educated in compensatory strategies to promote symptom management and engagement in daily activities, including symptom tracking, stovetop burner analogy, symptom scale, visual accommodations, sound accommodations, and recommendations for \"managing daily life\" per [facility] approved handout, as well as fatigue management strategies (See goal below). Educated in light neck exercises for improved stretch and cervical ROM with emphasis on light movement/stretch for cervical flexion, extension, rotation, and lateral flexion. Recommended behavioral counseling services to further address mental health needs and initiated referral to concussion provider through concussion . Educated in box breathing coping strategy for stress management. Will continue to monitor needs and address, as appropriate, to promote IND and engagement in ADLs/IADLs.     Goal Identifier Fatigue Management   Goal Description Pt will demonstrate 3 energy conservation strategies (e.g. pacing, planning, prioritizing, sleep hygiene, etc.) to facilitate fatigue management with ADL/IADL tasks (e.g. laundry, meal preparation, walking/exercise).   Target Date 04/13/23   Date Met      Progress (detail required for progress note):  Goal progressing.  Pt educated in fatigue management strategies including stovetop burner analogy, planning, prioritizing, pacing, and posture with application to ADLs/IADLs, including use of shower chair. Initiated education in sleep hygiene strategies to promote improved sleep and increased engagement in daily activities. Will plan to further " progress in future sessions.     Goal Identifier Memory   Goal Description Patient will demonstrate improved memory skills by completing short-term memory tasks in occupational therapy with 90% accuracy using compensatory strategies for increased safety and independence with ADL/IADLS (keeping up schedule, remembering to take medications, turn off the stove when done).   Target Date 04/13/23   Date Met      Progress (detail required for progress note):  Goal progressing with initiation of CAM testing. Will plan to further progress in future sessions, as appropriate.     Goal Identifier Attention   Goal Description Pt will alternate between 2-3 different tasks ( eg bill paying, word puzzle and making phone calls) x 15 minutes with 90% accuracy on all tasks, to stimulate return to higher level work, cooking, ability to grocery shop, and maneuver with passengers in the community.   Target Date 04/13/23   Date Met      Progress (detail required for progress note):  Goal progressing with initiation of CAM testing. Will plan to further progress in future sessions, as appropriate.     Goal Identifier Problem-Solving/Planning   Goal Description Pt will demonstrate the ability to complete moderate to complex problem-solving/planning tasks (WCPA, Errands, [employer], SET, etc.) with 90% accuracy to complete home and work tasks safely and accurately (e.g. meal preparation, financial management, medication management, driving, work).   Target Date 04/13/23   Date Met      Progress (detail required for progress note):  Goal progressing with initiation of CAM testing. Will plan to further progress in future sessions, as appropriate.     Plan:  Continue therapy per current plan of care. Pt has been seen for a total of 8 OP OT sessions with focus on the above goal areas (see progress noted above). Pt demonstrating improvement in awareness of factors contributing to her symptoms and ways to self-manage but her engagement in her  daily activities continues to be significantly limited by her post-concussion symptoms. Of note, she has many social stressors that are likely impacting her rate of recovery at this time. She remains appropriate for skilled OT services at 1x/week for an anticipated additional 12 weeks pending pt progress. Goal dates updated.    Discharge:  No    Amanda Rogers, OTR/L

## 2023-01-23 NOTE — PROGRESS NOTES
06/30/22 0900   Quick Adds   Quick Adds Vestibular Eval   Type of Visit Initial OP PT Evaluation   General Information   Start of Care Date 06/30/22   Referring Physician Dominik Peter MD   Orders Evaluate and Treat as Indicated   Order Date 06/24/22   Medical Diagnosis Benign paroxysmal positional vertigo, unspecified laterality (H81.10)   Onset of illness/injury or Date of Surgery 01/01/22  (dizziness started around January per pt report)   Precautions/Limitations no known precautions/limitations   Surgical/Medical history reviewed Yes   Pertinent history of current vestibular problem (include personal factors and/or comorbidities that impact the POC)  Migraines   Pertinent history of current problem (include personal factors and/or comorbidities that impact the POC) Joanne is a 61 yo F who arrives for OP vesitbular PT evaluation of dizziness referred by ENT, Dr Peter, after f/u on 6/24/22 reporting nasal congestion, nose bleads, nausea, vomiting, tinnitus, aural fullness and pressure, headaches, and positional dizziness. Pt reports today she has had migraines and sinus issues on and off x5 years. Feels like her head is 'full' - sloshing/congested. Has tried everything for migraines and nothing helps - without change over the past few months. - Has been folloed by Dr Peter on sinuses and headaches. Hasn't had her hearing tested -  going to f/u on this Aug 3rd, 2022.  Dizziness - started about 6 months ago around January,2022. Some days are worse than others but has sxs every day. Feels worse in the morning - this morning was difficult. Balance - feels she looks drunk while walking/staggers. Falls - reports a bad fall on ice at the end of December - feels like it 'knocked the sense out of me' and provoked this dizziness. Nausea - Turning her head feels like she is going to vomit. Cannot tilt her head back in the shower without feeling like she is going to pass out. For sxs relief - sits, limits  showering and moving slowly, avoids tipping her head back. When not moving, or moving quickly then she feels okay. Denies: hx of head or neck surgery or concussion. No dizziness laying or turning in bed. Denies vertigo, no spinning. Medications - Not taking meclizine. Has been very sendentary because of her sxs   Patient/Family Goals Statement decrease sxs of dizziness, improve balance and participation in daily tasks   Fall Risk Screen   Fall screen completed by PT   Have you fallen 2 or more times in the past year? No   Have you fallen and had an injury in the past year? Yes  (fell in December with sxs of dizziness starting in Jan)   Is patient a fall risk? Yes;Department fall risk interventions implemented   Fall screen comments pt reports: . Balance - feels she looks drunk while walking/staggers. Falls - reports a bad fall on ice at the end of December - feels like it 'knocked the sense out of me' and provoked this dizziness; no observed or reported instability, further assessment of balance TBA   Abuse Screen (yes response referral indicated)   Physical Signs of Abuse Present no   Functional Scales   Functional Scales and Outcomes DHI: 70/100   Cognitive Status Examination   Level of Consciousness alert   Follows Commands and Answers Questions 100% of the time   Personal Safety and Judgment intact   Gait   Gait Comments over short distances indoors without AD: walks maintaining a good speed without path deviations or instability   Infrared Goggle Exam or Frenzel Lense Exam   Vestibular Suppressant in Last 24 Hours? No   Exam completed with Infrared Goggles   Spontaneous Nystagmus Negative   Gaze Evoked Nystagmus Negative   Head Shake Horizontal Nystagmus Horizontal R   Head Shake Horizontal Nystagmus comments 2 beats R (considered WNL), reports inc dizziness   Positional Testing comments loaded veronica hallpikes   Veronica-Hallpike (right) Negative   [place]-Hallpike (right) comments in laying: (-) nystagmus, no dizziness;  return to sitting: l(+) lightheaded and nausea, (-) nyatgmus   [place]-Hallpike (Left) Negative   [place]-Hallpike (left) comments in laying: (-) nystagmus, no dizziness; return to sitting: l(+) lightheaded and nausea, (-) nyatgmus   [facility] Supine Roll Test (Right) Other   [facility] Supine Roll Test (Right) Comments questionable, very mild L beating nystagmus; (-) dizziness/vertigo   [facility] Sup[facility] Roll Test (Left) Other   [facility] Supine Roll Test (Left) Comments  questionable, very mild R beating nyatgmus, (-) dizziness; return to sitting: reports feeling good   Other Infrared Goggle Exam or Frenzel Lense Exam Comments no nystagmus/vertigo with positional testing that is consistant with BPPV   Planned Therapy Interventions   Planned Therapy Interventions neuromuscular re-education   Clinical Impression   Criteria for Skilled Therapeutic Interventions Met yes, treatment indicated   PT Diagnosis sensory selection and weighting deficit   Influenced by the following impairments (+) reported sxs of dizziness,instability, nausea   Functional limitations due to impairments limited functional head turning/tilting during daily activites (ie showering), limited functional movement, more sedentary d/t her sxs   Clinical Presentation Stable/Uncomplicated   Clinical Decision Making (Complexity) Low complexity   Therapy Frequency 1 time/week   Predicted Duration of Therapy Intervention (days/wks) 12 weeks   Risk & Benefits of therapy have been explained Yes   Patient, Family & other staff in agreement with plan of care Yes   Education Assessment   Barriers to Learning No barriers   GOALS   PT Eval Goals 1;2;3;4   Goal 1   Goal Identifier HEP   Goal Description Pt to demonstrate (I) and report consistency with HEP for habituation, postural and gait stability, and aerboic exercises for progress towards decreased dizziness and improved balance, and motivation towards continued self maintenance following d/c from therapy.   Goal Progress at eval,  6/30/22: pt does not participate in exercise, but reports motivation to follow therapist recommendations towards decreased dizziness/improved balance   Target Date 09/27/22   Goal 2   Goal Identifier Dizziness   Goal Description Pt to report a significant reduction in dizziness from 'severe' to 'mild' with improved participation in daily activities.   Goal Progress at [facility], 6/30/22: reports dizziness started about 6 months ago around January,2022. Some days are worse than others but has sxs every day. Feels worse in the morning. DHI: 70/100.   Target Date 09/27/22   Goal 3   Goal Identifier Balance   Goal Description Pt to demonstrate safety with postural and gait stability challenges, and report no longer feeling drunk/staggering while walking   Goal Progress at [facility], 6/30/22: pt reports she looks drunk while walking/staggers   Target Date 09/27/22   Goal 4   Goal Identifier Nausea   Goal Description Pt to demonstrate quick functional head rotations/tilting without sxs of nausea reporting no longer limited while showering.   Goal Progress at [facility], 6/30/22: pt reports turning her head feels like she is going to vomit. Cannot tilt her head back in the shower without feeling like she is going to pass out.   Target Date 09/27/22   Total Evaluation Time   PT Hildaal, Low Complexity Minutes ([id number]) 35

## 2023-01-26 ENCOUNTER — HOSPITAL ENCOUNTER (OUTPATIENT)
Dept: OCCUPATIONAL THERAPY | Facility: CLINIC | Age: 63
Setting detail: THERAPIES SERIES
Discharge: HOME | End: 2023-01-26
Attending: OTOLARYNGOLOGY

## 2023-01-26 PROCEDURE — 97535 SELF CARE MNGMENT TRAINING: CPT | Mod: GO

## 2023-02-08 ENCOUNTER — MYC REFILL (OUTPATIENT)
Dept: FAMILY MEDICINE | Facility: CLINIC | Age: 63
End: 2023-02-08

## 2023-02-08 DIAGNOSIS — F98.8 ATTENTION DEFICIT DISORDER (ADD) WITHOUT HYPERACTIVITY: ICD-10-CM

## 2023-02-08 RX ORDER — DEXTROAMPHETAMINE SACCHARATE, AMPHETAMINE ASPARTATE, DEXTROAMPHETAMINE SULFATE AND AMPHETAMINE SULFATE 5; 5; 5; 5 MG/1; MG/1; MG/1; MG/1
20 TABLET ORAL 2 TIMES DAILY
Qty: 60 TABLET | Refills: 0 | OUTPATIENT
Start: 2023-02-08

## 2023-02-09 ENCOUNTER — HOSPITAL ENCOUNTER (OUTPATIENT)
Dept: OCCUPATIONAL THERAPY | Facility: CLINIC | Age: 63
Setting detail: THERAPIES SERIES
Discharge: HOME | End: 2023-02-09
Attending: OTOLARYNGOLOGY

## 2023-02-09 PROCEDURE — 97535 SELF CARE MNGMENT TRAINING: CPT | Mod: GO

## 2023-02-10 NOTE — TELEPHONE ENCOUNTER
Patient called about this refill. They are completely out of these medications. Do they need an appointment to get the refills approved?    Kim Lozoya

## 2023-02-10 NOTE — TELEPHONE ENCOUNTER
There should be multiple refills at the pharmacy - I sent in 3 months of fills a few months ago. Has she contacted them?

## 2023-03-13 ENCOUNTER — MYC REFILL (OUTPATIENT)
Dept: FAMILY MEDICINE | Facility: CLINIC | Age: 63
End: 2023-03-13

## 2023-03-13 DIAGNOSIS — F98.8 ATTENTION DEFICIT DISORDER (ADD) WITHOUT HYPERACTIVITY: ICD-10-CM

## 2023-03-13 RX ORDER — DEXTROAMPHETAMINE SACCHARATE, AMPHETAMINE ASPARTATE, DEXTROAMPHETAMINE SULFATE AND AMPHETAMINE SULFATE 5; 5; 5; 5 MG/1; MG/1; MG/1; MG/1
20 TABLET ORAL 2 TIMES DAILY
Qty: 60 TABLET | Refills: 0 | Status: CANCELLED | OUTPATIENT
Start: 2023-03-13

## 2023-03-15 RX ORDER — BISACODYL 5 MG/1
TABLET, DELAYED RELEASE ORAL
Qty: 4 TABLET | Refills: 0 | Status: SHIPPED | OUTPATIENT
Start: 2023-03-15 | End: 2023-04-03 | Stop reason: HOSPADM

## 2023-03-16 NOTE — PROGRESS NOTES
[facility]    Outpatient Occupational Therapy Discharge Note  Patient: Joanne Padilla  : 1960    Beginning/End Dates of Reporting Period:  10/27/23 to 23 (with most recent reporting period being 23 - 23)    Referring Provider: Dominik Peter MD     Therapy Diagnosis: Decreased IND with ADLs/IADLs    Client Self Report: Pt reports she has had a constant headache with dizziness and nausea for about the last week. Had to cancel her appointment last week as it became too much. Reports she isn't sleeping, melatonin gummy did not help and actually made her sick overnight. Pt reports she did get an opportunity to review sleep hygiene handout but cannot recall the information off the top of her head right now. Notes she used to never have trouble with sleeping.    Objective Measurements:     Objective Measure: CAM   Details: As of 23 - MODERATE impairment in mental flexibility (attempts different folding pattern but unable to achieve new solution) and complex concrete problem solving (9 of 9 tasks, 2 out of order; pt does describe logical reasoning for placement of 2 tasks out of order). NO impairment in simple concrete problem solving, moderate concrete problem solving, abstract thinking.     As of 22 - SEVERE impairment in temporal awareness (estimates 10 minutes when 30 minutes had passed). MODERATE impairment in visual memory/sequencing forward (4/7) and backward (3/7). MILD impairment in auditory recall/recognition (2.5/3), auditory memory/sequencing forward (6/7) and backward (4/7), coin recognition (half dollar). NO ipmairment in attention span, remote and recent memory/orientation, following directions (yes/no, one-step verbal, written directions), immediate memory (auditory and motor), matching, object identification, motor recall/recognition, mental manipulation of  "money, simple math skills (single and multiple digit), foresight/planning, and safety/judgment.          Outcome Measures (most recent score):  Concussion Symptom Assessment (score out of 90). A higher score indicates greater impairment: 71    Goals:     Goal Identifier Symptom Management   Goal Description Pt will identify and use 3 symptom management strategies (computer adaptations, self-awareness and self-management of symptoms, use of adaptive techniques, Safe and Sound protocol, etc.) to promote ADL/IADL completion (e.g. reading, exercising, work, sleep).   Target Date 04/13/23   Date Met      Progress (detail required for progress note):  Goal progressing but not fully met. Pt educated in compensatory strategies to promote symptom management and engagement in daily activities, including symptom tracking, stovetop burner analogy, symptom scale, visual accommodations, sound accommodations, and recommendations for \"managing daily life\" per [facility] approved handout, as well as fatigue management strategies (See goal below). Educated in light neck exercises for improved stretch and cervical ROM with emphasis on light movement/stretch for cervical flexion, extension, rotation, and lateral flexion. Recommended behavioral counseling services to further address mental health needs and initiated referral to concussion provider through concussion . Educated in box breathing coping strategy for stress management and additional emotional/stress awareness strategies. Had planned to further address but pt failed to attend/schedule further appointments.     Goal Identifier Fatigue Management   Goal Description Pt will demonstrate 3 energy conservation strategies (e.g. pacing, planning, prioritizing, sleep hygiene, etc.) to facilitate fatigue management with ADL/IADL tasks (e.g. laundry, meal preparation, walking/exercise).   Target Date 04/13/23   Date Met      Progress (detail required for progress note): "  Goal progressing but not fully met. Pt educated in fatigue management strategies including stovetop burner analogy, planning, prioritizing, pacing, and posture with application to ADLs/IADLs, including use of shower chair. Educated in sleep hygiene strategies to promote improved sleep and facilitate increased engagement in daily activities. Had planned to further address but pt failed to attend/schedule further appointments.     Goal Identifier Memory   Goal Description Patient will demonstrate improved memory skills by completing short-term memory tasks in occupational therapy with 90% accuracy using compensatory strategies for increased safety and independence with ADL/IADLS (keeping up schedule, remembering to take medications, turn off the stove when done).   Target Date 04/13/23   Date Met      Progress (detail required for progress note):  Goal progressing but not fully met. Initiated education in memory compensatory strategies with focus on contribution of lifestyle factors, such as physical activity, on overall brain health and memory. Educated in compensatory strategies for organization and using calendar to plan activities and reference daily. Had planned to further address but pt failed to attend/schedule further appointments. Pt welcome to return to OP OT in the future, as needed, to further address goal area.     Goal Identifier Attention   Goal Description Pt will alternate between 2-3 different tasks ( eg bill paying, word puzzle and making phone calls) x 15 minutes with 90% accuracy on all tasks, to stimulate return to higher level work, cooking, ability to grocery shop, and maneuver with passengers in the community.   Target Date 04/13/23   Date Met      Progress (detail required for progress note):  Goal progressing with initiation of CAM testing but not met. Unable to further progress prior to discharge from therapy. Pt welcome to return to OP OT in the future, as needed, to further address goal  area.     Goal Identifier Problem-Solving/Planning   Goal Description Pt will demonstrate the ability to complete moderate to complex problem-solving/planning tasks (WCPA, Errands, [employer], SET, etc.) with 90% accuracy to complete home and work tasks safely and accurately (e.g. meal preparation, financial management, medication management, driving, work).   Target Date 04/13/23   Date Met      Progress (detail required for progress note):  Goal progressing with initiation of CAM testing but not met. Educated in Goal/Plan/Do/Check metacognitive strategy to promote IND with higher level ADLs/IADLs. Unable to further progress prior to discharge from therapy. Pt welcome to return to OP OT in the future, as needed, to further address goal area.     Plan:  Discharge from therapy. Pt was seen for a total of 10 OP OT sessions with focus on the above goal areas (see progress noted above). Pt demonstrating improvement in awareness of factors contributing to her symptoms and ways to self-manage but her engagement in her daily activities continues to be significantly limited by her post-concussion symptoms. Of note, she has many social stressors that are likely impacting her rate of recovery at this time. Pt failed to attend/schedule further appointments and did not respond to phone calls from provider. She is welcome to return to OP OT in the future, as needed, with new referral from her provider.    Discharge:    Reason for Discharge: Patient has not made expected progress due to interrupted treatment attendance.  Patient has failed to schedule further appointments.    Equipment Issued: yellow overlay    Discharge Plan: Patient to continue home program and is welcome to return to OP OT in the future, as needed, with new referral from her provider.    Thank you for the referral of this patient.  If you have any questions regarding the information in this report, please feel free to contact me per the information provided  below.      Amanda Rogers MA, OTR/L  Occupational Therapist  [address] [address]  Clinic Fax:  [phone]  Clinic Phone: [phone]

## 2023-03-27 RX ORDER — ONDANSETRON 2 MG/ML
4 INJECTION INTRAMUSCULAR; INTRAVENOUS
Status: CANCELLED | OUTPATIENT
Start: 2023-03-27

## 2023-03-27 RX ORDER — LIDOCAINE 40 MG/G
CREAM TOPICAL
Status: CANCELLED | OUTPATIENT
Start: 2023-03-27

## 2023-04-01 ENCOUNTER — HEALTH MAINTENANCE LETTER (OUTPATIENT)
Age: 63
End: 2023-04-01

## 2023-04-13 DIAGNOSIS — F98.8 ATTENTION DEFICIT DISORDER (ADD) WITHOUT HYPERACTIVITY: ICD-10-CM

## 2023-04-13 RX ORDER — DEXTROAMPHETAMINE SACCHARATE, AMPHETAMINE ASPARTATE, DEXTROAMPHETAMINE SULFATE AND AMPHETAMINE SULFATE 5; 5; 5; 5 MG/1; MG/1; MG/1; MG/1
20 TABLET ORAL 2 TIMES DAILY
Qty: 60 TABLET | Refills: 0 | Status: SHIPPED | OUTPATIENT
Start: 2023-04-13 | End: 2023-07-12

## 2023-04-13 NOTE — TELEPHONE ENCOUNTER
Routing refill request to provider for review/approval because:  Drug not on the [facility] refill protocol   Ghislaine Villagomez RN, BSN  [facility]

## 2023-04-13 NOTE — TELEPHONE ENCOUNTER
[facility] Doesn't have adderall 20 mg in stock, however looks like [facility] pharmacy has this.     Chanel Baker, Umm  [facility]  [address] [place] [address] [phone]

## 2023-04-21 ENCOUNTER — OFFICE VISIT (OUTPATIENT)
Dept: PHYSICAL MEDICINE AND REHAB | Facility: CLINIC | Age: 63
End: 2023-04-21

## 2023-04-21 VITALS — SYSTOLIC BLOOD PRESSURE: 114 MMHG | RESPIRATION RATE: 16 BRPM | DIASTOLIC BLOOD PRESSURE: 79 MMHG | HEART RATE: 87 BPM

## 2023-04-21 DIAGNOSIS — S09.90XS FATIGUE DUE TO OLD HEAD INJURY: ICD-10-CM

## 2023-04-21 DIAGNOSIS — G24.3 CERVICAL DYSTONIA: Primary | ICD-10-CM

## 2023-04-21 DIAGNOSIS — F07.81 POST CONCUSSION SYNDROME: ICD-10-CM

## 2023-04-21 DIAGNOSIS — R53.83 FATIGUE DUE TO OLD HEAD INJURY: ICD-10-CM
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
## 2023-04-21 PROCEDURE — 99215 OFFICE O/P EST HI 40 MIN: CPT | Performed by: PHYSICAL MEDICINE & REHABILITATION

## 2023-04-21 RX ORDER — LIDOCAINE 4 G/G
1 PATCH TOPICAL EVERY 24 HOURS
Qty: 30 PATCH | Refills: 1 | Status: SHIPPED | OUTPATIENT
Start: 2023-04-21 | End: 2023-05-21

## 2023-04-21 ASSESSMENT — PAIN SCALES - GENERAL: PAINLEVEL: SEVERE PAIN (7)
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
## 2023-05-05 ENCOUNTER — OFFICE VISIT (OUTPATIENT)
Dept: URGENT CARE | Facility: URGENT CARE | Age: 63
End: 2023-05-05

## 2023-05-05 VITALS
RESPIRATION RATE: 20 BRPM | OXYGEN SATURATION: 98 % | SYSTOLIC BLOOD PRESSURE: 113 MMHG | DIASTOLIC BLOOD PRESSURE: 80 MMHG | HEART RATE: 90 BPM | TEMPERATURE: 97.5 F

## 2023-05-05 DIAGNOSIS — J45.21 MILD INTERMITTENT ASTHMA WITH ACUTE EXACERBATION: Primary | ICD-10-CM

## 2023-05-05 DIAGNOSIS — J06.9 VIRAL URI WITH COUGH: ICD-10-CM

## 2023-05-05 PROCEDURE — 99214 OFFICE O/P EST MOD 30 MIN: CPT | Performed by: PHYSICIAN ASSISTANT

## 2023-05-05 RX ORDER — PREDNISONE 20 MG/1
40 TABLET ORAL DAILY
Qty: 10 TABLET | Refills: 0 | Status: SHIPPED | OUTPATIENT
Start: 2023-05-05 | End: 2023-05-10

## 2023-05-05 RX ORDER — BENZONATATE 200 MG/1
200 CAPSULE ORAL 3 TIMES DAILY PRN
Qty: 30 CAPSULE | Refills: 0 | Status: SHIPPED | OUTPATIENT
Start: 2023-05-05 | End: 2023-11-07

## 2023-05-05 RX ORDER — ALBUTEROL SULFATE 90 UG/1
2 AEROSOL, METERED RESPIRATORY (INHALATION) EVERY 6 HOURS PRN
Qty: 8.5 G | Refills: 1 | Status: SHIPPED | OUTPATIENT
Start: 2023-05-05 | End: 2023-11-07

## 2023-05-05 ASSESSMENT — ENCOUNTER SYMPTOMS
FEVER: 0
SORE THROAT: 1
WHEEZING: 1
COUGH: 1
RHINORRHEA: 1

## 2023-05-05 NOTE — PROGRESS NOTES
Assessment & Plan:        ICD-10-CM    1. Mild intermittent asthma with acute exacerbation  J45.21 albuterol (PROAIR HFA/PROVENTIL HFA/VENTOLIN HFA) 108 (90 Base) MCG/ACT inhaler     predniSONE (DELTASONE) 20 MG tablet      2. Viral URI with cough  J06.9 benzonatate (TESSALON) 200 MG capsule            Plan/Clinical Decision Making:    Patient with URI symptoms for 3 days with worsening cough with wheezing. Has hx of asthma with flare ups with illness. Acute wheezing today.   Refilled albuterol inhaler.   Will start course of prednisone. Reviewed medication side effects.   Can use tessalon as needed for cough.     Patient working on decreasing smoking, has patches at home.       Return if symptoms worsen or fail to improve, for in 3-5 days.     At the end of the encounter, I discussed results, diagnosis, medications. Discussed red flags for immediate return to clinic/ER, as well as indications for follow up if no improvement. Patient understood and agreed to plan. Patient was stable for discharge.        Comfort Sy PA-C on 5/5/2023 at 1:37 PM          Subjective:     HPI:    Joanne is a 62 year old female who presents to clinic today for the following health issues:  Chief Complaint   Patient presents with     Urgent Care     Cough     Cough, congestion, sinus pressure/pain, sore throat , and chest heaviness-Sx started Tuesday      HPI    Patient complains of cough, nasal congestion. Sinus pressure. Chest heaviness with cough. Started on Tuesday.  Has had some wheezing.  Patient does get flare up of asthma with illness.   Hasn't had covid testing. Will do at home if not getting better. She doesn't go out much and isolating.     Smoker: 5 cigs a day, working on quitting/decreasing.     History obtained from the patient.    Review of Systems   Constitutional: Negative for fever.   HENT: Positive for congestion, rhinorrhea and sore throat (improving).    Respiratory: Positive for cough and wheezing.           Patient Active Problem List   Diagnosis     Chronic rhinitis     Atopic dermatitis     Migraines     Allergic rhinitis     Exhaustion due to excessive exertion(994.5)        Past Medical History:   Diagnosis Date     Allergic rhinitis, cause unspecified      Anxiety state, unspecified      Atopic dermatitis 10/10/2011     Cardiac dysrhythmia, unspecified      Chronic rhinitis 10/10/2011     Colitis      Exhaustion due to excessive exertion(994.5)      Gallstones      Hypercholesterolemia      Insomnia, unspecified      Major depressive disorder, single episode, unspecified      Migraines 12/17/2012     Osteoarthritis of CMC joint of thumb      Other acne      Other malaise and fatigue      Palpitations      PONV (postoperative nausea and vomiting)      Uncomplicated asthma     Occasional       Social History     Tobacco Use     Smoking status: Every Day     Packs/day: 0.50     Years: 15.00     Pack years: 7.50     Types: Cigarettes     Smokeless tobacco: Former     Quit date: 10/1/2021     Tobacco comments:     has her chantix   Vaping Use     Vaping status: Never Used   Substance Use Topics     Alcohol use: No             Objective:     Vitals:    05/05/23 1332   BP: 113/80   Pulse: 90   Resp: 20   Temp: 97.5  F (36.4  C)   TempSrc: Tympanic   SpO2: 98%         Physical Exam   EXAM:   Pleasant, alert, appropriate appearance. NAD.  Head Exam: Normocephalic, atraumatic.  Eye Exam:   non icteric/injection.    Ear Exam: TMs grey without bulging. Normal canals.  Normal pinna.  Nose Exam: Normal external nose.    OroPharynx Exam:  Moist mucous membranes. No erythema, pharynx without exudate or hypertrophy.  Neck/Thyroid Exam:  No LAD.    Chest/Respiratory Exam: bilateral wheezing  Cardiovascular Exam: RRR. No murmur or rubs.        Results:  No results found for any visits on 05/05/23.

## 2023-05-11 ENCOUNTER — TELEPHONE (OUTPATIENT)
Dept: FAMILY MEDICINE | Facility: CLINIC | Age: 63
End: 2023-05-11

## 2023-05-11 NOTE — CONFIDENTIAL NOTE
Patient Quality Outreach    Patient is due for the following:   Asthma  -  ACT needed  Colon Cancer Screening  IVD  -  LDL (Fasting)  Physical Preventive Adult Physical    Next Steps:   Schedule a Adult Preventative    Type of outreach:    Sent [employer] message.      Questions for provider review:    None           Choco Wilhelm MA

## 2023-05-11 NOTE — LETTER
[facility]  [address] [facility] [address] [phone]       May 25, 2023    Joanne Padilla  [address] [place] [id number]    Dear Joanne,    We care about your health and have reviewed your health plan and are making recommendations based on this review, to optimize your health.    You are in particular need of attention regarding:  -Asthma  -Coronary Artery and/or Vascular Disease  -Colon Cancer Screening  -Wellness (Physical) Visit     We are recommending that you:  -schedule a LAB ONLY APPOINTMENT to recheck your: Lipids (fasting cholesterol - nothing to eat except water and/or meds for 8+ hours) test within the next 1-4 weeks.    -schedule a WELLNESS (Physical) APPOINTMENT with me.   I will check fasting labs the same day - nothing to eat except water and meds for 8-10 hours prior.    -schedule a COLONOSCOPY to look for colon cancer (due every 10 years or 5 years in higher risk situations.)        Colon cancer is now the second leading cause of cancer-related deaths in the [place] for both men and women and there are over 130,000 new cases and 50,000 deaths per year from colon cancer.  Colonoscopies can prevent 90-95% of these deaths.  Problem lesions can be removed before they ever become cancer.  This test is not only looking for cancer, but also getting rid of precancerious lesions.    If you are under/uninsured, we recommend you contact the [employer] program. [employer] is a free colorectal cancer screening program that provides colonoscopies for eligible under/uninsured [place] men and women. If you are interested in receiving a free colonoscopy, please call [employer] at [phone] (mention code [employer]) to see if you re eligible.      If you do not wish to do a colonoscopy or cannot afford to do one, at this time, there is another option. It is called a FIT test or Fecal Immunochemical Occult Blood Test (take home stool sample  kit).  It does not replace the colonoscopy for colorectal cancer screening, but it can detect hidden bleeding in the lower colon.  It does need to be repeated every year and if a positive result is obtained, you would be referred for a colonoscopy.          If you have completed either one of these tests at another facility, please call with the details of when and where the tests were done and if they were normal or not. Or have the records sent to our clinic so that we can best coordinate your care.    -Complete and return the attached ASTHMA CONTROL TEST.  If your total score is 19 or less or you have been to the ER or urgent care for your asthma, then please schedule an asthma followup appointment.    In addition, here is a list of due or overdue Health Maintenance reminders.    Health Maintenance Due   Topic Date Due    Asthma Action Plan - yearly  Never done    Zoster (Shingles) Vaccine (1 of 2) Never done    COVID-19 Vaccine (3 - Pfizer series) 09/24/2021    Flu Vaccine (1) 09/01/2022    Yearly Preventive Visit  12/10/2022    Pneumococcal Vaccine (2 - PCV) 12/10/2022    Colorectal Cancer Screening  12/12/2022    Asthma Control Test  03/15/2023       To address the above recommendations, we encourage you to contact us at [phone], via [employer] or by contacting [employer] toll free at [phone] 24 hours a day. They will assist you with finding the most convenient time and location.    Thank you for trusting [facility] and we appreciate the opportunity to serve you.  We look forward to supporting your healthcare needs in the future.    Healthy Regards,    Your [facility] Team

## 2023-05-11 NOTE — LETTER
[facility]  [address] [facility] [address] [phone]       August 28, 2023    Joanne Padilla  [address] [place] [address]    Dear Joanne,    We care about your health and have reviewed your health plan and are making recommendations based on this review, to optimize your health.    You are in particular need of attention regarding:  -Asthma  -Coronary Artery and/or Vascular Disease  -Colon Cancer Screening  -Wellness (Physical) Visit     We are recommending that you:  -schedule a WELLNESS (Physical) APPOINTMENT with me.   I will check fasting labs the same day - nothing to eat except water and meds for 8-10 hours prior.    -schedule a COLONOSCOPY to look for colon cancer (due every 10 years or 5 years in higher risk situations.)        Colon cancer is now the second leading cause of cancer-related deaths in the [place] for both men and women and there are over 130,000 new cases and 50,000 deaths per year from colon cancer.  Colonoscopies can prevent 90-95% of these deaths.  Problem lesions can be removed before they ever become cancer.  This test is not only looking for cancer, but also getting rid of precancerious lesions.    If you are under/uninsured, we recommend you contact the [employer] program. [employer] is a free colorectal cancer screening program that provides colonoscopies for eligible under/uninsured [place] men and women. If you are interested in receiving a free colonoscopy, please call [employer] at [phone] (mention code [employer]) to see if you re eligible.      If you do not wish to do a colonoscopy or cannot afford to do one, at this time, there is another option. It is called a FIT test or Fecal Immunochemical Occult Blood Test (take home stool sample kit).  It does not replace the colonoscopy for colorectal cancer screening, but it can detect hidden bleeding in the lower colon.  It does need to be repeated every year  and if a positive result is obtained, you would be referred for a colonoscopy.          If you have completed either one of these tests at another facility, please call with the details of when and where the tests were done and if they were normal or not. Or have the records sent to our clinic so that we can best coordinate your care.    In addition, here is a list of due or overdue Health Maintenance reminders.    Health Maintenance Due   Topic Date Due    Asthma Action Plan - yearly  Never done    Zoster (Shingles) Vaccine (1 of 2) Never done    COVID-19 Vaccine (3 - Pfizer series) 09/24/2021    Yearly Preventive Visit  12/10/2022    Pneumococcal Vaccine (2 - PCV) 12/10/2022    Colorectal Cancer Screening  12/12/2022    Asthma Control Test  03/15/2023    Diptheria Tetanus Pertussis (DTAP/TDAP/TD) Vaccine (2 - Td or Tdap) 07/01/2023    Talk to your care team about options to quit tobacco use.  07/07/2023    Flu Vaccine (1) 09/01/2023    LUNG CANCER SCREENING  09/21/2023       To address the above recommendations, we encourage you to contact us at [phone], via [employer] or by contacting Central Scheduling toll free at [phone] 24 hours a day. They will assist you with finding the most convenient time and location.    Thank you for trusting [facility] and we appreciate the opportunity to serve you.  We look forward to supporting your healthcare needs in the future.    Healthy Regards,    Your [facility] Team

## 2023-05-25 NOTE — CONFIDENTIAL NOTE
Patient Quality Outreach    Patient is due for the following:   Asthma  -  ACT needed  Colon Cancer Screening  IVD  -  LDL (Fasting)  Physical Preventive Adult Physical    Next Steps:   Schedule a Adult Preventative    Type of outreach:    Sent letter.    Next Steps:  Reach out within 90 days via Letter.    Max number of attempts reached: Yes. Will try again in 90 days if patient still on fail list.    Questions for provider review:    None           Choco Wilhelm MA

## 2023-07-12 ENCOUNTER — MYC REFILL (OUTPATIENT)
Dept: FAMILY MEDICINE | Facility: CLINIC | Age: 63
End: 2023-07-12

## 2023-07-12 DIAGNOSIS — F98.8 ATTENTION DEFICIT DISORDER (ADD) WITHOUT HYPERACTIVITY: ICD-10-CM

## 2023-07-12 RX ORDER — DEXTROAMPHETAMINE SACCHARATE, AMPHETAMINE ASPARTATE, DEXTROAMPHETAMINE SULFATE AND AMPHETAMINE SULFATE 5; 5; 5; 5 MG/1; MG/1; MG/1; MG/1
20 TABLET ORAL 2 TIMES DAILY
Qty: 60 TABLET | Refills: 0 | Status: SHIPPED | OUTPATIENT
Start: 2023-07-12 | End: 2023-08-10

## 2023-08-10 DIAGNOSIS — F98.8 ATTENTION DEFICIT DISORDER (ADD) WITHOUT HYPERACTIVITY: ICD-10-CM

## 2023-08-10 RX ORDER — DEXTROAMPHETAMINE SACCHARATE, AMPHETAMINE ASPARTATE, DEXTROAMPHETAMINE SULFATE AND AMPHETAMINE SULFATE 5; 5; 5; 5 MG/1; MG/1; MG/1; MG/1
20 TABLET ORAL 2 TIMES DAILY
Qty: 60 TABLET | Refills: 0 | Status: SHIPPED | OUTPATIENT
Start: 2023-08-10 | End: 2023-09-10

## 2023-08-28 NOTE — CONFIDENTIAL NOTE
Patient Quality Outreach    Patient is due for the following:   Asthma  -  Asthma follow-up visit  Colon Cancer Screening  IVD  -  LDL (Fasting)  Physical Preventive Adult Physical    Next Steps:   Schedule a Adult Preventative    Type of outreach:    Sent letter.      Questions for provider review:    None           Choco Wilhelm MA

## 2023-09-09 DIAGNOSIS — F98.8 ATTENTION DEFICIT DISORDER (ADD) WITHOUT HYPERACTIVITY: ICD-10-CM

## 2023-09-10 RX ORDER — DEXTROAMPHETAMINE SACCHARATE, AMPHETAMINE ASPARTATE, DEXTROAMPHETAMINE SULFATE AND AMPHETAMINE SULFATE 5; 5; 5; 5 MG/1; MG/1; MG/1; MG/1
20 TABLET ORAL 2 TIMES DAILY
Qty: 60 TABLET | Refills: 0 | Status: SHIPPED | OUTPATIENT
Start: 2023-09-10 | End: 2023-10-11

## 2023-10-10 DIAGNOSIS — F98.8 ATTENTION DEFICIT DISORDER (ADD) WITHOUT HYPERACTIVITY: ICD-10-CM

## 2023-10-10 RX ORDER — DEXTROAMPHETAMINE SACCHARATE, AMPHETAMINE ASPARTATE, DEXTROAMPHETAMINE SULFATE AND AMPHETAMINE SULFATE 5; 5; 5; 5 MG/1; MG/1; MG/1; MG/1
20 TABLET ORAL 2 TIMES DAILY
Qty: 60 TABLET | Refills: 0 | OUTPATIENT
Start: 2023-10-10

## 2023-10-11 NOTE — TELEPHONE ENCOUNTER
Patient is calling and said she has had a brain injury and she has not worked for over one year and she is dealing with over $30,000 of medical bills. She says she really cannot afford any more doctor bills. She has applied for assistance and she is hoping she will get this in the next month.  She is asking for one more refill in the mean time if possible.

## 2023-10-11 NOTE — TELEPHONE ENCOUNTER
Pt is calling back to see if this has been filled yet    Med and pharmacy pended- please review  Please route back to triage to call pt back    Thank you  Estrada Dee RN on 10/11/2023 at 5:36 PM

## 2023-10-12 RX ORDER — DEXTROAMPHETAMINE SACCHARATE, AMPHETAMINE ASPARTATE, DEXTROAMPHETAMINE SULFATE AND AMPHETAMINE SULFATE 5; 5; 5; 5 MG/1; MG/1; MG/1; MG/1
20 TABLET ORAL 2 TIMES DAILY
Qty: 60 TABLET | Refills: 0 | Status: SHIPPED | OUTPATIENT
Start: 2023-10-12 | End: 2023-11-07

## 2023-10-12 NOTE — TELEPHONE ENCOUNTER
Sent [employer] message requesting a call back for an appt. Two more attempts will be made.    Jenny Allen  Lead

## 2023-10-12 NOTE — TELEPHONE ENCOUNTER
Please let her know that we cannot continue to prescribe stimulant Rx without clinic visits. I will fill one more time and then have to stop. We can offer care coordination services if she'd like

## 2023-10-12 NOTE — TELEPHONE ENCOUNTER
Patient calling again inquiring the status of the Adderall prescription.    (See messages below.)  States she cannot afford to come in at this time.    Please advise, thanks.

## 2023-11-07 ENCOUNTER — OFFICE VISIT (OUTPATIENT)
Dept: FAMILY MEDICINE | Facility: CLINIC | Age: 63
End: 2023-11-07
Payer: MEDICAID

## 2023-11-07 VITALS
DIASTOLIC BLOOD PRESSURE: 82 MMHG | OXYGEN SATURATION: 98 % | TEMPERATURE: 98.6 F | HEART RATE: 96 BPM | SYSTOLIC BLOOD PRESSURE: 128 MMHG | HEIGHT: 68 IN | RESPIRATION RATE: 18 BRPM | WEIGHT: 198.4 LBS | BODY MASS INDEX: 30.07 KG/M2

## 2023-11-07 DIAGNOSIS — F07.81 POST CONCUSSION SYNDROME: ICD-10-CM

## 2023-11-07 DIAGNOSIS — J45.20 MILD INTERMITTENT ASTHMA WITHOUT COMPLICATION: ICD-10-CM

## 2023-11-07 DIAGNOSIS — Z12.11 SCREEN FOR COLON CANCER: ICD-10-CM

## 2023-11-07 DIAGNOSIS — I70.0 ATHEROSCLEROSIS OF ABDOMINAL AORTA (H): ICD-10-CM

## 2023-11-07 DIAGNOSIS — R91.8 PULMONARY NODULES: ICD-10-CM

## 2023-11-07 DIAGNOSIS — F98.8 ATTENTION DEFICIT DISORDER (ADD) WITHOUT HYPERACTIVITY: Primary | ICD-10-CM

## 2023-11-07 PROCEDURE — 99214 OFFICE O/P EST MOD 30 MIN: CPT | Performed by: PHYSICIAN ASSISTANT

## 2023-11-07 RX ORDER — ATORVASTATIN CALCIUM 20 MG/1
20 TABLET, FILM COATED ORAL DAILY
Qty: 90 TABLET | Refills: 3 | Status: SHIPPED | OUTPATIENT
Start: 2023-11-07

## 2023-11-07 RX ORDER — ALBUTEROL SULFATE 90 UG/1
2 AEROSOL, METERED RESPIRATORY (INHALATION) EVERY 6 HOURS PRN
Qty: 8.5 G | Refills: 1 | Status: SHIPPED | OUTPATIENT
Start: 2023-11-07

## 2023-11-07 RX ORDER — ASPIRIN 81 MG/1
81 TABLET ORAL DAILY
Qty: 90 TABLET | Refills: 3 | Status: SHIPPED | OUTPATIENT
Start: 2023-11-07

## 2023-11-07 RX ORDER — DEXTROAMPHETAMINE SACCHARATE, AMPHETAMINE ASPARTATE, DEXTROAMPHETAMINE SULFATE AND AMPHETAMINE SULFATE 5; 5; 5; 5 MG/1; MG/1; MG/1; MG/1
20 TABLET ORAL 2 TIMES DAILY
Qty: 60 TABLET | Refills: 0 | Status: SHIPPED | OUTPATIENT
Start: 2023-11-09 | End: 2023-12-11

## 2023-11-07 ASSESSMENT — ASTHMA QUESTIONNAIRES: ACT_TOTALSCORE: 12

## 2023-11-07 ASSESSMENT — PAIN SCALES - GENERAL: PAINLEVEL: NO PAIN (0)

## 2023-11-07 NOTE — PROGRESS NOTES
Assessment & Plan     Attention deficit disorder (ADD) without hyperactivity  Post concussion syndrome  Joanne has a lot going on, and certainly loss of job/financial stress/insurance has added to that. She continues to suffer from a fall last year though I note that she has expressed a lot of these similar concerns many years ago when first starting adderall. I still question her sleep habits playing a role. We can continue the current stimulant dosing for now. Ill place a referral to care coordination so she can get hooked up with some help in many facets of SDOH  - amphetamine-dextroamphetamine (ADDERALL) 20 MG tablet; Take 1 tablet (20 mg) by mouth 2 times daily  - Primary Care - Care Coordination Referral; Future  - Primary Care - Care Coordination Referral; Future    Mild intermittent asthma uncomplicated  stable  - albuterol (PROAIR HFA/PROVENTIL HFA/VENTOLIN HFA) 108 (90 Base) MCG/ACT inhaler; Inhale 2 puffs into the lungs every 6 hours as needed for shortness of breath or wheezing  - Primary Care - Care Coordination Referral; Future    Pulmonary nodules  She needs to update with her smoking hx and recent screen but will need insurance first  - Primary Care - Care Coordination Referral; Future  - CT Chest w Contrast; Future    Atherosclerosis of abdominal aorta (H24)  She should be taking below. Discussed smoking cessation  - Primary Care - Care Coordination Referral; Future  - aspirin 81 MG EC tablet; Take 1 tablet (81 mg) by mouth daily  - atorvastatin (LIPITOR) 20 MG tablet; Take 1 tablet (20 mg) by mouth daily    Screen for colon cancer  due  - Fecal colorectal cancer screen FIT - Future (S+30); Future       Nicotine/Tobacco Cessation:  She reports that she has been smoking cigarettes. She has a 7.50 pack-year smoking history. She quit smokeless tobacco use about 2 years ago.  Nicotine/Tobacco Cessation Plan:   Information offered: Patient not interested at this time      BMI:   Estimated body mass  "index is 30.62 kg/m  as calculated from the following:    Height as of this encounter: 1.715 m (5' 7.5\").    Weight as of this encounter: 90 kg (198 lb 6.4 oz).           Fermín Larios PA-C  [facility] SHALONDA Rubio is a 63 year old, presenting for the following health issues:  Recheck Medication        11/7/2023    10:21 AM   Additional Questions   Roomed by Luis Enrique GREY   Accompanied by No one         11/7/2023    10:21 AM   Patient Reported Additional Medications   Patient reports taking the following new medications none       History of Present Illness       Reason for visit:  Prescription refill    She eats 0-1 servings of fruits and vegetables daily.She consumes 0 sweetened beverage(s) daily.She exercises with enough effort to increase her heart rate 10 to 19 minutes per day.  She exercises with enough effort to increase her heart rate 4 days per week.   She is taking medications regularly.         Medication Followup of Adderall   Taking Medication as prescribed: yes  Side Effects:  None  Medication Helping Symptoms:  yes    Joanne Padilla is a 63 year old female who presents today for medication check  She lost her job in Fall of last year following performance limitations that she continues to attribute to her concussion  She had been there around 7 years before that  Has struggled to get insurance since then  Has been out of work for nearly a year  She continues to think the lingering concussion symptoms have caused a lot of the complications over the past year  \"Do not have my wits about me\"    Had been following with specialty for some time  Did provide some things to do physically to help with neck     Feels like sleep is currently restless, not refreshing and wakes     She has since stopped a majority of her medications 2/2 cost  Spending a lot of her money for son        Review of Systems   Constitutional, HEENT, cardiovascular, pulmonary, gi and gu systems " "are negative, except as otherwise noted.      Objective    /82   Pulse 96   Temp 98.6  F (37  C) (Oral)   Resp 18   Ht 1.715 m (5' 7.5\")   Wt 90 kg (198 lb 6.4 oz)   LMP  (LMP Unknown)   SpO2 98%   BMI 30.62 kg/m    Body mass index is 30.62 kg/m .  Physical Exam   GENERAL: alert and no distress, appears fatigued  EYES: Eyes grossly normal to inspection, PERRL and conjunctivae and sclerae normal  RESP: quiet, though clear throughout  CV: regular rates and rhythm without ectopy  NEURO: mentation intact and cranial nerves 2-12 intact                      "

## 2023-11-07 NOTE — COMMUNITY RESOURCES LIST (ENGLISH)
11/07/2023   [facility]  N/A  For questions about this resource list or additional care needs, please contact your primary care clinic or care manager.  Phone: [phone]   Email: N/A   Address: [address] [place] [address]   Hours: N/A        Financial Stability       Rent and mortgage payment assistance  1  [facility] - Rent payment assistance Distance: 3.44 miles      In-Person, Phone/Virtual   [address] [place] [address]  Language: English  Hours: Mon 8:00 AM - 4:00 PM , Tue 8:00 AM - 7:00 PM , Wed - Thu 8:00 AM - 4:00 PM  Fees: Free   Phone: (166) 125-2800 Email: info@[employer] Website: https://MJH/resources/resource-centers/     2  Community Action Partnership ([facility]) [facility] Her[place]th  Kd [facility] - Family Homeless Prevention Assistance Program (FHPAP) Distance: 4.7 miles      In-Person   [address] [place] [address]  Language: English, Kuwaiti  Hours: Mon - Fri 8:00 AM - 4:30 PM  Fees: Free   Phone: (509) 839-7563 Email: info@[employer] Website: http://www.[employer].org          Food and Nutrition       Food pantry  3  360 [facility] Food Shelf Distance: 2.04 miles      In-Person, Pickup   [address] [place] [address]  Language: English, Kuwaiti  Hours: Mon 12:00 PM - 6:00 PM , Tue 10:00 AM - 6:00 PM , Thu 10:00 AM - 6:00 PM , Sat 9:00 AM - 12:00 PM  Fees: Free   Phone: (275) 328-5267 Email: info@[employer] Website: https://www.MJH/     4  [address] [place] [address] Food Shelf - Coordinated entry food pantry Distance: 2.84 miles      Pickup   [address] [place] [address]  Language: English  Hours: Tue 10:00 AM - 4:00 PM , Thu 10:00 AM - 4:00 PM  Fees: Free   Phone: (593) 320-6180 Email: info@[employer] Website: https://www.MJH/     SNAP application  assistance  5  [address] [place] [address] Distance: 3.44 miles      In-Person   [address] [place] [address]  Language: English  Hours: Mon 8:00 AM - 4:00 PM , Tue 8:00 AM - 7:00 PM , Wed - Thu 8:00 AM - 4:00 PM  Fees: Free   Phone: (648) 311-8695 Email: info@[address] [place] [address].[facility] Website: https://[address] [place] [address].[facility]/resources/resource-centers/     6  Community Action Partnership (CAP) [facility]  [facility]d [facility] Distance: 4.7 miles      In-Person   [address] [place] [address]  Language: English, Northern Irish  Hours: Mon - Fri 8:00 AM - 8:00 PM  Fees: Free   Phone: (575) 550-5164 Email: info@[facility] Th[facility] RegionMicroPort (Shanghai).org Website: http://www.capPenny Auction Solutions.org     Soup kitchen or free meals  7  [place] by the [facility] - Loaves and Fishes Distance: 4.85 miles      Pickup   [address] [place] [address]  Language: English, Northern Irish  Hours: Mon - Thu 5:30 PM - 6:30 PM  Fees: Free   Phone: (614) 382-5827 Email: tico@easter.org Website: http://easter.org/wordpress/?page_id=5168     8  Deejay the [facility] - Loaves and Fishes Distance: 9.34 miles      Pickup   [address] [place] [address]  Language: English  Hours: Mon - Fri 5:00 PM - 6:00 PM  Fees: Free   Phone: (907) 927-3890 Email: contactus@[employer].org Website: https://www.[employer].org/          Hotlines and Helplines       Hotline - Housing crisis  9  [facility] (Main Office) - Emergency Services Distance: 9.39 miles      Phone/Virtual   [address] [place] [address]  Language: English  Hours: Mon - Sun Open 24 Hours   Phone: (105) 926-9843 Email: info@[employer]n.org Website: http://Whi.org     10  [facility] Distance: 16.3 miles      Phone/Virtual   [address] [place] [address]  Language: English  Hours: Mon - Sun Open 24 Hours   Phone: (931) 427-2573 Email: communications@oscs-mn.org Website:  https://oscs-mn.org/oursaviourshousing/          Housing       Coordinated Entry access point  11  [facility] - [facility] Distance: 16.07 miles      In-Person, Phone/Virtual   [address] [place] [address]  Language: English, Portuguese  Hours: Mon - Fri 8:30 AM - 4:30 PM  Fees: Free   Phone: (214) 338-6849 Email: info@[facility].org Website: https://www.[facility].org/locations/[facility]/     12  [facility] [employer] [facility] [place] ([facility] - Housing Services Distance: 16.22 miles      In-Person   [address] [place] [address]  Language: English  Hours: Mon - Fri 9:00 AM - 5:00 PM  Fees: Free   Phone: (390) 172-8690 Email: housing@[facility].org Website: http://www.[facility].org/housing     Drop-in center or day shelter  13  [facility] Distance: 16.64 miles      In-Person   [address] [place] [address]  Language: English  Hours: Mon - Fri 12:00 PM - 3:00 PM  Fees: Free   Phone: (135) 854-5524 Email: [employer] It@[employer] Website: http://Jump On It.org/     14  [facility] - Opportunity Center Distance: 16.73 miles      In-Person   [address] [place] [address]  Language: English, Cymraes, Portuguese  Hours: Mon - Sat 7:00 AM - 3:00 PM  Fees: Free, Self Pay   Phone: (378) 256-7637 Email: info@[employer] Website: https://www.[employer].org/locations/opportunity-center/     Housing search assistance  15  [facility] & Red[facility]velopment Authority - Rental Homes for Future Homebuyers Program Distance: 8.17 miles      Phone/Virtual   [address] [place] [address]  Language: English  Hours: Mon - Fri 8:00 AM - 4:30 PM  Fees: Free   Phone: (417) 626-7885 Email: hra@[facility].gov Website: https://www.[facility].gov/hra/[facility]-and-yoeifuxeqatav-vxporcegf-vzp     16  [facility] - Aging and Disability Services Distance: 12.41 miles      In-Person   [address] [place] [address]  Language: English  Hours: Mon - Fri 8:00 AM - 4:00 PM  Fees: Free, Insurance, Sliding Fee   Phone: (992) 773-3090 Email: kapil@coChina Auto Rental HoldingsTri-State Memorial Hospital. Website: https://www.[facility]./HealthFamily/Disabilities     Shelter for families  17  [facility] Family Shelter Distance: 8.08 miles      In-Person   [address] [place] [address]  Language: English  Hours: Mon - Sun Open 24 Hours  Fees: Free, Sliding Fee   Phone: (191) 354-4170 Ext.1 Email: info@[facility] Orange CountyChina Auto Rental HoldingsW[facility] Medical Center Website: http://www.[facility] Orange CountyKidaro     Shelter for individuals  18  Community Action Partnership ([facility])  Herberth Acosta  Kd [facility] Distance: 4.7 miles      In-Person   [address] [place] [address]  Language: English, Yoruba  Hours: Mon - Fri 8:00 AM - 4:30 PM  Fees: Free   Phone: (580) 456-5590 Email: info@capMomail.org Website: http://www.capMomail.org     19  [facility] - [facility] [place] [facility] - [facility] [place] [facility] Distance: 16.07 miles      In-Person   [address] [place] [address]  Language: English  Hours: Mon - Sun 5:00 PM - 10:00 AM  Fees: Free, Self Pay   Phone: (443) 200-1020 Email: info@[facility].org Website: https://www.[facility] HospitalStrikeForce Technologies.org/locations/[facility]-saint-paul/          Important Numbers & Websites       Emergency Services   911  City Services   311  Poison Control   (421) 749-3372  Suicide Prevention Lifeline   (970) 886-4603 (TALK)  Child Abuse Hotline   (114) 460-6853 (4-A-Child)  Sexual Assault Hotline   (157) 311-2645 (HOPE)  National Runaway Safeline   (910) 610-6472 (RUNAWAY)  All-Options Talkline   (556) 427-8913  Substance Abuse Referral   (248) 308-9675 (HELP)

## 2023-11-07 NOTE — COMMUNITY RESOURCES LIST (ENGLISH)
11/07/2023   [facility]  N/A  For questions about this resource list or additional care needs, please contact your primary care clinic or care manager.  Phone: [phone]   Email: N/A   Address: [address] [place] [address]   Hours: N/A        Financial Stability       Rent and mortgage payment assistance  1  [facility] - Rent payment assistance Distance: 3.44 miles      In-Person, Phone/Virtual   [address] [place] [address]  Language: English  Hours: Mon 8:00 AM - 4:00 PM , Tue 8:00 AM - 7:00 PM , Wed - Thu 8:00 AM - 4:00 PM  Fees: Free   Phone: (288) 602-7065 Email: info@[employer] Website: https://[employer] Foundation/resources/resource-centers/     2  Community Action Partnership ([facility]) [facility] Her[place]th  Kd [facility] - Family Homeless Prevention Assistance Program (FHPAP) Distance: 4.7 miles      In-Person   [address] [place] [address]  Language: English, Kuwaiti  Hours: Mon - Fri 8:00 AM - 4:30 PM  Fees: Free   Phone: (654) 382-7261 Email: info@[employer] International Website: http://www.[employer].org          Food and Nutrition       Food pantry  3  360 [facility] Food Shelf Distance: 2.04 miles      In-Person, Pickup   [address] [place] [address]  Language: English, Kuwaiti  Hours: Mon 12:00 PM - 6:00 PM , Tue 10:00 AM - 6:00 PM , Thu 10:00 AM - 6:00 PM , Sat 9:00 AM - 12:00 PM  Fees: Free   Phone: (832) 442-2219 Email: info@[employer] Website: https://www.Symbian Foundation/     4  [address] [place] [address] Food Shelf - Coordinated entry food pantry Distance: 2.84 miles      Pickup   [address] [place] [address]  Language: English  Hours: Tue 10:00 AM - 4:00 PM , Thu 10:00 AM - 4:00 PM  Fees: Free   Phone: (193) 821-4034 Email: info@[employer] Health International Website: https://www.Symbian Foundation/     SNAP application  assistance  5  [address] [place] [address] Distance: 3.44 miles      In-Person   [address] [place] [address]  Language: English  Hours: Mon 8:00 AM - 4:00 PM , Tue 8:00 AM - 7:00 PM , Wed - Thu 8:00 AM - 4:00 PM  Fees: Free   Phone: (594) 375-4826 Email: info@[address] [place] [address].[facility] Website: https://[address] [place] [address].[facility]/resources/resource-centers/     6  Community Action Partnership (CAP) [facility]  Kd [facility] Distance: 4.7 miles      In-Person   [address] [place] [address]  Language: English, Indian  Hours: Mon - Fri 8:00 AM - 8:00 PM  Fees: Free   Phone: (404) 751-4691 Email: info@[facility] HospitalMerchant View.org Website: http://www.capAtonometrics.org     Soup kitchen or free meals  7  [employer] by the [facility] - Loaves and Fishes Distance: 4.85 miles      Pickup   [address] [place] [address]  Language: English, Indian  Hours: Mon - Thu 5:30 PM - 6:30 PM  Fees: Free   Phone: (353) 449-7007 Email: tico@easter.org Website: http://easter.org/wordpress/?page_id=5168     8  Deejay the [facility] - Loaves and Fishes Distance: 9.34 miles      Pickup   [address] [place] [address]  Language: English  Hours: Mon - Fri 5:00 PM - 6:00 PM  Fees: Free   Phone: (634) 547-9278 Email: contactus@[facility].org Website: https://www.[facility].org/          Hotlines and Helplines       Hotline - Housing crisis  9  [facility] (Main Office) - Emergency Services Distance: 9.39 miles      Phone/Virtual   [address] [place] [address]  Language: English  Hours: Mon - Sun Open 24 Hours   Phone: (271) 552-2792 Email: info@[employer] Re[employer]uellen.org Website: http://Tagorize.org     10  [facility] Distance: 16.3 miles      Phone/Virtual   [address] [place] [address]  Language: English  Hours: Mon - Sun Open 24 Hours   Phone: (357) 557-5654 Email: communications@oscs-mn.org Website:  https://oscs-mn.org/oursaviourshousing/          Housing       Coordinated Entry access point  11  [facility] - [facility] Distance: 16.07 miles      In-Person, Phone/Virtual   [address] [place] [address]  Language: English, Italian  Hours: Mon - Fri 8:30 AM - 4:30 PM  Fees: Free   Phone: (657) 330-4005 Email: info@[facility].org Website: https://www.[facility].org/locations/[facility]/     12  [facility] [employer] [facility] [place] ([facility] - Housing Services Distance: 16.22 miles      In-Person   [address] [place] [address]  Language: English  Hours: Mon - Fri 9:00 AM - 5:00 PM  Fees: Free   Phone: (612) 159-9671 Email: housing@[facility].org Website: http://www.[facility].org/housing     Drop-in center or day shelter  13  [facility] Distance: 16.64 miles      In-Person   [address] [place] [address]  Language: English  Hours: Mon - Fri 12:00 PM - 3:00 PM  Fees: Free   Phone: (687) 361-5908 Email: Fluid@[employer] Website: http://Fluid.org/     14  [facility] - Opportunity Center Distance: 16.73 miles      In-Person   [address] [place] [address]  Language: English, Egyptian, Italian  Hours: Mon - Sat 7:00 AM - 3:00 PM  Fees: Free, Self Pay   Phone: (377) 831-9857 Email: info@[employer] Website: https://www.[employer].org/locations/opportunity-center/     Housing search assistance  15  [facility] & Redevelopment Authority - Rental Homes for Future Homebuyers Program Distance: 8.17 miles      Phone/Virtual   [address] [place] [place] [address]  Language: English  Hours: Mon - Fri 8:00 AM - 4:30 PM  Fees: Free   Phone: (568) 919-8471 Email: hra@[facility].gov Website: https://www.[facility].gov/hra/[facility]-and-yjvwcxocexpct-hpfqlbfcw-jjl     16  [facility] - Aging and Disability Services Distance: 12.41 miles      In-Person   [address] [place] [address]  Language: English  Hours: Mon - Fri 8:00 AM - 4:00 PM  Fees: Free, Insurance, Sliding Fee   Phone: (165) 391-6188 Email: kapil@coSecondHomeFormerly [facility]. Website: https://www.[facility]./HealthFamily/Disabilities     Shelter for families  17  [facility] Family [facility] Distance: 8.08 miles      In-Person   [address] [place] [address]  Language: English  Hours: Mon - Sun Open 24 Hours  Fees: Free, Sliding Fee   Phone: (859) 332-6053 Ext.1 Email: info@[facility] PointSecondHome[facility] Website: http://www.[facility] Crown PointRespicardia     Shelter for individuals  18  Community Action Partnership ([facility])  Herberth Acosta  Kd [facility] Distance: 4.7 miles      In-Person   [address] [place] [address]  Language: English, Icelandic  Hours: Mon - Fri 8:00 AM - 4:30 PM  Fees: Free   Phone: (115) 568-8485 Email: info@capFerric [employer]iconductor.org Website: http://www.capFerric Semiconductor.org     19  [facility] - [facility] [place] [facility] - [facility] [place] [facility] Distance: 16.07 miles      In-Person   [address] [place] [address]  Language: English  Hours: Mon - Sun 5:00 PM - 10:00 AM  Fees: Free, Self Pay   Phone: (813) 696-7086 Email: info@[employer].org Website: https://www.[facility] HospitalPearFunds.org/locations/[facility]-saint-paul/          Important Numbers & Websites       Emergency Services   911  City Services   311  Poison Control   (492) 745-1375  Suicide Prevention Lifeline   (672) 648-6612 (TALK)  Child Abuse Hotline   (154) 105-9669 (4-A-Child)  Sexual Assault Hotline   (612) 289-8550 (HOPE)  National Runaway Safeline   (198) 445-3553 (RUNAWAY)  All-Options Talkline   (562) 401-2766  Substance Abuse Referral   (695) 388-3730 (HELP)

## 2023-11-08 ENCOUNTER — PATIENT OUTREACH (OUTPATIENT)
Dept: CARE COORDINATION | Facility: CLINIC | Age: 63
End: 2023-11-08
Payer: MEDICAID

## 2023-11-08 NOTE — PROGRESS NOTES
Clinic Care Coordination Contact  [facility]    Clinical Data: Care Coordinator Outreach    Outreach Documentation Number of Outreach Attempt   11/8/2023   9:38 AM 1       Patient answers and states she is about to attend another appointment and asks for a call back later this morning.    Plan: Care Coordinator will call patient back at 10:30am.    LASHONDA Walton, B.S. [facility] Care Coordination  [facility] Clinics:  [place], [place] and [place]  [phone]  [email or web address]

## 2023-11-10 ENCOUNTER — PATIENT OUTREACH (OUTPATIENT)
Dept: NURSING | Facility: CLINIC | Age: 63
End: 2023-11-10
Payer: MEDICAID

## 2023-11-10 NOTE — LETTER
[facility]  Patient Centered Plan of Care  About Me:        Patient Name:  Joanne Padilla    YOB: 1960  Age:         63 year old   Mai MRN:    [id number] Telephone Information:  Home Phone [phone]   Mobile [phone]       Address:  [address] [facility] [address] Email address:  [email or web address]      Emergency Contact(s)    Name Relationship Lgl Grd Work Phone Home Phone Mobile Phone           Primary language:  English     needed? No   [place] [employer]:  [phone] op. 1  Other communication barriers:No data recorded  Preferred Method of Communication:  Mail  Current living arrangement: Other    Mobility Status/ Medical Equipment: No data recorded      Health Maintenance  Health Maintenance Reviewed:   Health Maintenance Due   Topic Date Due    ASTHMA ACTION PLAN  Never done    ZOSTER IMMUNIZATION (1 of 2) Never done    RSV VACCINE (Pregnancy & 60+) (1 - 1-dose 60+ series) Never done    YEARLY PREVENTIVE VISIT  12/10/2022    Pneumococcal Vaccine: Pediatrics (0 to 5 Years) and At-Risk Patients (6 to 64 Years) (2 - PCV) 12/10/2022    COLORECTAL CANCER SCREENING  12/12/2022    DTAP/TDAP/TD IMMUNIZATION (2 - Td or Tdap) 07/01/2023    INFLUENZA VACCINE (1) 09/01/2023    COVID-19 Vaccine (3 - 2023-24 season) 09/01/2023    LUNG CANCER SCREENING  09/21/2023    MAMMO SCREENING  12/10/2023         My Access Plan  Medical Emergency 911   Primary Clinic Line [facility] - [phone]   24 Hour Appointment Line [phone] or  [phone]) (toll-free)   24 Hour Nurse Line [phone] (toll-free)   Preferred Urgent Care No data recorded   Preferred Hospital [facility]  [phone]     Preferred Pharmacy WRITTEN PRESCRIPTION REQUESTED     Behavioral Health Crisis Line The National Suicide Prevention Lifeline at 1-818.762.8424 or Text/Call 428           My Care Team Members  Patient  Care Team         Relationship Specialty Notifications Start End    Fermín Larios PA-C PCP - General Physician Assistant  3/1/19     Phone: [phone] Fax: [phone] [address] [address] [facility] [address]    Fermín Larios PA-C Assigned PCP   12/16/18     Phone: [phone] Fax: [phone]         [address] [address] [facility] [address]    Oly Sheehan MD (Inactive) MD Ophthalmology  3/11/22     Phone: [phone] Fax: [phone]         [address] [facility] [address]    Magnolia Castillo MD MD Dermatology  4/15/22     Phone: [phone] Fax: [phone]         [address] [facility] [address] [facility] [address]    Veronica Nelson MD MD Neurology  9/19/22     Phone: [phone] Fax: [phone]         [address] [place] [address] [facility] [address]    Veronica Nelson MD Assigned Neuroscience Provider   10/15/22     Phone: [phone] Fax: [phone]         [address] [address]    Dominik Peter MD Assigned Surgical Provider   5/6/23     Phone: [phone] Fax: [phone]         [address] [facility] [address] [facility] [address]    You Bolaños MD Assigned Musculoskeletal Provider   5/6/23     Phone: [phone] Fax: [phone]         [address] [facility] [address] [facility] [address]    You Devries DO Physician Physical Medicine and Rehabilitation  7/28/23     Phone: [phone] Fax: [phone]         [address] [facility] [address] [facility] [address]    Ketan Desir LSW Lead Care Coordinator Primary Care - CC Admissions 11/8/23     Phone: [phone]         Monisha Wakefield, CHW Community Health Worker Primary Care - [facility] Admissions 11/10/23     Phone: [phone]                     My Care Plans  Self Management and Treatment Plan    Care Plan  Care Plan: Financial Wellbeing       Problem: Patient expresses financial resource strain       Goal: Create an action plan to increase financial stability       Start  Date: 11/10/2023 Expected End Date: 2/1/2024    Note:     Goal Statement: I will continue to take steps to further support my financial wellbeing over the next 3 month(s).  Barriers: No income.  Strengths: Accepting of Care Coordination and Financial Resoruce Worker referral.   Patient expressed understanding of goal: yes    Action steps to achieve this goal:  I will review resources and supports sent to me via [employer] and consider outreaching and establishing with one or more which interest me.  I will continue to work on the application(s) for: Medical Assistance/Insurance, Judith Care, and half-way/Disability.   I will use the clinic as a resource and I understand I can contact my clinic with 24/7 after hours services available.   I will continue to outreach to care coordination as needed for additional resources or supports.                                Care Plan: Housing Instability       Problem: SDOH LACK OF STABLE HOUSING       Goal: Establish Stable Housing       Start Date: 11/10/2023 Expected End Date: 2/1/2024    Note:     Goal Statement: I will continue to take steps over the next 3 months to secure safe, stable, and long term housing.  Barriers: no income.   Strengths: Living in a hotel. $3000/mo.   Patient expressed understanding of goal: yes    Action steps to achieve this goal:  [employer] resources sent 11/22/2023  I will continue to work on financial applications to hopefully get additional income.   I will review resources and supports sent to me via [employer] for housing, financial supports, and food shelves.  I will outreach to supports and consider establishing with ones I might find beneficial.   I will contact my care team with questions, concerns, support needs.   I will use the clinic as a resource and I understand I can contact my clinic with 24/7 after hours services available.   I will continue to outreach to care coordination as needed for additional resources or supports.                                            My Medical and Care Information  Problem List   Patient Active Problem List   Diagnosis    Chronic rhinitis    Atopic dermatitis    Migraines    Allergic rhinitis    Exhaustion due to excessive exertion(994.5)      Current Medications and Allergies:     Allergies   Allergen Reactions    Codeine Nausea and Vomiting    Dust Mites     Latex     Morphine And Related Nausea     Current Outpatient Medications   Medication    albuterol (PROAIR HFA/PROVENTIL HFA/VENTOLIN HFA) 108 (90 Base) MCG/ACT inhaler    amphetamine-dextroamphetamine (ADDERALL) 20 MG tablet    aspirin 81 MG EC tablet    atorvastatin (LIPITOR) 20 MG tablet     Current Facility-Administered Medications   Medication    Botulinum Toxin Type A (BOTOX) 200 units injection 200 Units       Care Coordination Start Date: 11/7/2023   Frequency of Care Coordination: Monthly   Form Last Updated: 11/22/2023

## 2023-11-10 NOTE — LETTER
[facility] CARE COORDINATION  [address]  [facility] [address]    November 22, 2023    Joanne Padilla  [address] [facility] [address]      Dear Joanne,    I am a clinic care coordinator who works with Fremín Larios PA-C with the [facility]. I wanted to thank you for spending the time to talk with me.  Below is a description of clinic care coordination and how I can further assist you.       The clinic care coordination team is made up of a registered nurse, , financial resource worker and community health worker who understand the health care system. The goal of clinic care coordination is to help you manage your health and improve access to the health care system. Our team works alongside your provider to assist you in determining your health and social needs. We can help you obtain health care and community resources, providing you with necessary information and education. We can work with you through any barriers and develop a care plan that helps coordinate and strengthen the communication between you and your care team.  Our services are voluntary and are offered without charge to you personally.    Please feel free to contact me with any questions or concerns regarding care coordination and what we can offer.      We are focused on providing you with the highest-quality healthcare experience possible.    Sincerely,     Ketan Desir [facility]  Clinic Care Coordinator  [facility]  [phone]  [email or web address]    Enclosed: I have enclosed a copy of the Patient Centered Plan of Care. This has helpful information and goals that we have talked about. Please keep this in an easy to access place to use as needed.

## 2023-11-10 NOTE — PROGRESS NOTES
Clinic Care Coordination Contact  Clinic Care Coordination Contact  OUTREACH    Referral Information:    Chief Complaint   Patient presents with    Clinic Care Coordination - Initial     Resources for Support: Financial/MA/Disability        Universal Utilization:   Utilization      No Show Count (past year)  6             ED Visits  0             Hospital Admissions  0                    Current as of: 11/19/2023  6:28 PM                Clinical Concerns:  Current Medical Concerns:    Patient Active Problem List   Diagnosis    Chronic rhinitis    Atopic dermatitis    Migraines    Allergic rhinitis    Exhaustion due to excessive exertion(994.5)       [facility] outreached to pt on this date to follow up on concerns. 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 Pt is in the process of starting the guardianship process for her son so she has more piece of mind should anything happen to her. 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 Pt is also waiting on [employer] assessment to be scheduled.     Pt claims to have applied for MA/SNAP/Cash assistance back in June. (Of note, CHW note indicates this was only 3-4 weeks ago). It's unclear at this point which application pt completed and the status of that application. Pt is considering applying for disability as well as death benefits from ex as well who was killed 1 year ago. Pt did apply for Judith Care 11/2/23. Living on jail account.     Completed Applications:  -MA/SNAP/Cash? (Unclear on completion date)  -Judith Care (11/2/23)    Pending Applications:  -Guardianship for son  -Disability/longterm for pt  -Ex Death Benefits      Current Behavioral Concerns: Life and health stressors.       Education Provided to patient: Resources sent via [employer]1health.      Health Maintenance Reviewed:   Health Maintenance Due   Topic Date Due    ASTHMA ACTION PLAN  Never done    ZOSTER IMMUNIZATION (1 of 2) Never done    RSV VACCINE  (Pregnancy & 60+) (1 - 1-dose 60+ series) Never done    YEARLY PREVENTIVE VISIT  12/10/2022    Pneumococcal Vaccine: Pediatrics (0 to 5 Years) and At-Risk Patients (6 to 64 Years) (2 - PCV) 12/10/2022    COLORECTAL CANCER SCREENING  12/12/2022    DTAP/TDAP/TD IMMUNIZATION (2 - Td or Tdap) 07/01/2023    INFLUENZA VACCINE (1) 09/01/2023    COVID-19 Vaccine (3 - 2023-24 season) 09/01/2023    LUNG CANCER SCREENING  09/21/2023    MAMMO SCREENING  12/10/2023     Clinical Pathway: None    Medication Management:  Medication review status: Medications reviewed and no changes reported per patient.          Living Situation:  Homeless- living in hotel     Lifestyle & Psychosocial Needs:    Social Determinants of Health     Food Insecurity: High Risk (11/7/2023)    Food Insecurity     Within the past 12 months, did you worry that your food would run out before you got money to buy more?: Yes     Within the past 12 months, did the food you bought just not last and you didn t have money to get more?: Yes   Depression: Not at risk (4/21/2023)    PHQ-2     PHQ-2 Score: 0   Housing Stability: High Risk (11/7/2023)    Housing Stability     Do you have housing? : No     Are you worried about losing your housing?: Yes   Tobacco Use: High Risk (11/7/2023)    Patient History     Smoking Tobacco Use: Every Day     Smokeless Tobacco Use: Former     Passive Exposure: Not on file   Financial Resource Strain: Low Risk  (11/7/2023)    Financial Resource Strain     Within the past 12 months, have you or your family members you live with been unable to get utilities (heat, electricity) when it was really needed?: No   Alcohol Use: Not on file   Transportation Needs: Low Risk  (11/7/2023)    Transportation Needs     Within the past 12 months, has lack of transportation kept you from medical appointments, getting your medicines, non-medical meetings or appointments, work, or from getting things that you need?: No   Physical Activity: Not on file    Interpersonal Safety: Low Risk  (11/7/2023)    Interpersonal Safety     Do you feel physically and emotionally safe where you currently live?: Yes     Within the past 12 months, have you been hit, slapped, kicked or otherwise physically hurt by someone?: No     Within the past 12 months, have you been humiliated or emotionally abused in other ways by your partner or ex-partner?: No   Stress: Not on file   Social Connections: Not on file         Care Coordinator has reviewed patient's Social Determinants of Health (SDoH) on this date. Upon review, changes were not  made.       Care Plan:  Care Plan: Financial Wellbeing       Problem: Patient expresses financial resource strain       Goal: Create an action plan to increase financial stability       Start Date: 11/10/2023 Expected End Date: 2/1/2024    Note:     Goal Statement: I will continue to take steps to further support my financial wellbeing over the next 3 month(s).  Barriers: No income.  Strengths: Accepting of Care Coordination and Financial Resoruce Worker referral.   Patient expressed understanding of goal: yes    Action steps to achieve this goal:  I will review resources and supports sent to me via EMU[employer] and consider outreaching and establishing with one or more which interest me.  I will continue to work on the application(s) for: Medical Assistance/Insurance, Judith Care, and long term/Disability.   I will use the clinic as a resource and I understand I can contact my clinic with 24/7 after hours services available.   I will continue to outreach to care coordination as needed for additional resources or supports.                                Care Plan: Housing Instability       Problem: SDOH LACK OF STABLE HOUSING       Goal: Establish Stable Housing       Start Date: 11/10/2023 Expected End Date: 2/1/2024    Note:     Goal Statement: I will continue to take steps over the next 3 months to secure safe, stable, and long term housing.  Barriers:  no income.   Strengths: Living in a hotel. $3000/mo.   Patient expressed understanding of goal: yes    Action steps to achieve this goal:  [employer] resources sent 11/22/2023  I will continue to work on financial applications to hopefully get additional income.   I will review resources and supports sent to me via [employer] for housing, financial supports, and food shelves.  I will outreach to supports and consider establishing with ones I might find beneficial.   I will contact my care team with questions, concerns, support needs.   I will use the clinic as a resource and I understand I can contact my clinic with 24/7 after hours services available.   I will continue to outreach to care coordination as needed for additional resources or supports.                                 Patient/Caregiver understanding: Pt reports understanding and denies any additional questions or concerns at this times. [facility] engaged in AIDET communication during encounter.         Future Appointments                In 1 week [facility], [facility]            Plan: Pt to review [employer] message with resources for Housing, financial, and disability resources. Pt to continue to work toward guardianship of son and follow up with county for benefits already applied. Care Coordination to follow up monthly.     Ketan Desir [facility]  Clinic Care Coordinator  [facility]  [phone]  [email or web address]

## 2023-11-24 ENCOUNTER — PATIENT OUTREACH (OUTPATIENT)
Dept: CARE COORDINATION | Facility: CLINIC | Age: 63
End: 2023-11-24
Payer: MEDICAID

## 2023-11-24 NOTE — TELEPHONE ENCOUNTER
Clinic Care Coordination Contact  Program: Angela  County: [facility] Case #:  [facility] Worker:   Angela #:   Subscriber #:   Renewal:  Date Applied:     FRW Outreach:   11/24/23- Outreach attempted x 1. Left message on voicemail with call back information and requested return call.  Plan: FRW will call again within one week.   Shira Dinero  Financial Resource Worker  [facility] [facility]  Clinic Care Coordination  [phone]         Health Insurance:      Referral/Screening:  FRW Screening      Row Name 11/22/23 1205       [facility] Benefits   Is patient requesting help applying for [facility] benefits? No       Insurance:   [place]? Yes       Is this an insurance renewal? No       Is this a new insurance application request? Yes       Have you recently applied for insurance? Yes       What date did you apply for insurance? June 2023       How many people in your household? 2       Do you file taxes? Yes       How many dependents do you claim? 1       What is the monthly gross income for the household (wages, social security, workers comp, and pension)? 0       OTHER   Is this a corina care application? No       Any other information for the FRW? Considering retirment/disability and Ex spouse death benefits. Pt has no income. Says she applied for MA back in June but something went wrong and it hasn't gone through. Would like to apply again.

## 2023-11-27 ENCOUNTER — PATIENT OUTREACH (OUTPATIENT)
Dept: CARE COORDINATION | Facility: CLINIC | Age: 63
End: 2023-11-27
Payer: MEDICAID

## 2023-11-27 NOTE — TELEPHONE ENCOUNTER
Clinic Care Coordination Contact  Program: Mnsure, corina care  County: [facility] Case #:  [facility] Worker:   Angela #:   Subscriber #:   Renewal:  Date Applied:     FRW Outreach:   11/27/23- Frw received a vm from pt so called her back. Pt stated that she needs help with Mnsure as well as corina care for her and her son Aris Alvarez. Frw called [facility] to make sure there is nothing active or pending and retrieved user/pass for pt mnsure account. Next, frw completed mnsure application online and pt and her son are both currently pending for MA starting 8/1/23 due wage from job. Pt state that her and son income are 0 for 2023 as well 2024. Frw informed to call the [facility] and clear it over the phone otherwise they will not get the card mailed. Pt state that she will call tomorrow. Frw then assisted pt in completing corina care for her and her son. Frw email the pre-filed application as well as mailing/email instruction and required verification. Frw will follow up within 30 days.  Shira Dinero  Financial Resource Worker  [facility] [facility]  Clinic Care Coordination  [phone]     11/24/23- Outreach attempted x 1. Left message on voicemail with call back information and requested return call.  Plan: FRW will call again within one week.   Shira Dinero  Financial Resource Worker  [facility] [facility] Care Coordination  [phone]         Health Insurance:      Referral/Screening:  FRW Screening      Row Name 11/22/23 1205       [facility] Benefits   Is patient requesting help applying for [facility] benefits? No       Insurance:   [place]? Yes       Is this an insurance renewal? No       Is this a new insurance application request? Yes       Have you recently applied for insurance? Yes       What date did you apply for insurance? June 2023       How many people in your household? 2       Do you file taxes? Yes       How many dependents do you claim? 1       What is the  monthly gross income for the household (wages, social security, workers comp, and pension)? 0       OTHER   Is this a corina care application? No       Any other information for the FRW? Considering retirment/disability and Ex spouse death benefits. Pt has no income. Says she applied for MA back in June but something went wrong and it hasn't gone through. Would like to apply again.

## 2023-12-05 ENCOUNTER — HOSPITAL ENCOUNTER (OUTPATIENT)
Dept: CT IMAGING | Facility: CLINIC | Age: 63
Discharge: HOME | End: 2023-12-05
Attending: PHYSICIAN ASSISTANT | Admitting: PHYSICIAN ASSISTANT
Payer: MEDICAID

## 2023-12-05 DIAGNOSIS — R91.8 PULMONARY NODULES: ICD-10-CM

## 2023-12-05 PROCEDURE — 71260 CT THORAX DX C+: CPT

## 2023-12-05 PROCEDURE — 250N000009 HC RX 250: Performed by: PHYSICIAN ASSISTANT

## 2023-12-05 PROCEDURE — 250N000011 HC RX IP 250 OP 636: Performed by: PHYSICIAN ASSISTANT

## 2023-12-05 RX ORDER — IOPAMIDOL 755 MG/ML
500 INJECTION, SOLUTION INTRAVASCULAR ONCE
Status: COMPLETED | OUTPATIENT
Start: 2023-12-05 | End: 2023-12-05

## 2023-12-05 RX ADMIN — IOPAMIDOL 100 ML: 755 INJECTION, SOLUTION INTRAVENOUS at 15:25

## 2023-12-05 RX ADMIN — SODIUM CHLORIDE 63 ML: 9 INJECTION, SOLUTION INTRAVENOUS at 15:25

## 2023-12-12 ENCOUNTER — PATIENT OUTREACH (OUTPATIENT)
Dept: CARE COORDINATION | Facility: CLINIC | Age: 63
End: 2023-12-12
Payer: MEDICAID

## 2023-12-12 NOTE — PROGRESS NOTES
Clinic Care Coordination Contact  [facility]/Voicemail    Clinical Data: Care Coordinator Outreach    Outreach Documentation Number of Outreach Attempt   11/8/2023   9:38 AM 1   12/12/2023  10:53 AM 1       Left message on patient's voicemail with call back information and requested return call.    Plan: Care Coordinator will try to reach patient again in 10 business days.    LASHONDA Walton, B.S. [facility] Care Coordination  [facility] Clinics:  [place]  [phone]  [email or web address]

## 2023-12-28 ENCOUNTER — PATIENT OUTREACH (OUTPATIENT)
Dept: CARE COORDINATION | Facility: CLINIC | Age: 63
End: 2023-12-28
Payer: MEDICAID

## 2023-12-28 NOTE — LETTER
[facility] CARE COORDINATION  [address]  [facility] [address]    January 12, 2024    Joanne Padilla  [address] [facility] [address]      Dear Joanne,    I have been unsuccessful in reaching you since our last contact. At this time the Care Coordination team will make no further attempts to reach you, however this does not change your ability to continue receiving care from your providers at your primary care clinic. If you need additional support from a care coordinator in the future please contact your clinic.    All of us at [facility] are invested in your health and are here to assist you in meeting your goals.     Sincerely,    Ketan Desir [facility]  Clinic Care Coordinator  [facility]  [phone]  [email or web address]

## 2023-12-28 NOTE — TELEPHONE ENCOUNTER
Clinic Care Coordination Contact  Program: Mnsure, corina care  County: [facility] Case #:  [facility] Worker:   Angela #:   Subscriber #:   Renewal:  Date Applied:     FRW Outreach:   12/28/23- Frw called pt to confirmed her MA approval. Pt didn't answer and couldn't leave a vm as vm was set up. Frw will follow within 30 days.  Shira Dinero  Financial Resource Worker  [facility] [facility] Care Coordination  [phone]     11/27/23- Frw received a vm from pt so called her back. Pt stated that she needs help with Mnsure as well as corina care for her and her son Aris Alvarez. Frw called [facility] to make sure there is nothing active or pending and retrieved user/pass for pt mnsure account. Next, frw completed mnsure application online and pt and her son are both currently pending for MA starting 8/1/23 due wage from job. Pt state that her and son income are 0 for 2023 as well 2024. Frw informed to call the [facility] and clear it over the phone otherwise they will not get the card mailed. Pt state that she will call tomorrow. Frw then assisted pt in completing corina care for her and her son. Frw email the pre-filed application as well as mailing/email instruction and required verification. Frw will follow up within 30 days.  Shira Dinero  Financial Resource Worker  [facility] [facility] Care Coordination  [phone]     11/24/23- Outreach attempted x 1. Left message on voicemail with call back information and requested return call.  Plan: FRW will call again within one week.   Shira Dinero  Financial Resource Worker  [facility] [facility] Care Coordination  [phone]         Health Insurance:      Referral/Screening:  FRW Screening      Row Name 11/22/23 1205       [facility] Benefits   Is patient requesting help applying for [facility] benefits? No       [place]:   [place]       Is this an insurance renewal? No       Is this a new insurance  application request? Yes       Have you recently applied for insurance? Yes       What date did you apply for insurance? June 2023       How many people in your household? 2       Do you file taxes? Yes       How many dependents do you claim? 1       What is the monthly gross income for the household (wages, social security, workers comp, and pension)? 0       OTHER   Is this a corina care application? No       Any other information for the FRW? Considering retirment/disability and Ex spouse death benefits. Pt has no income. Says she applied for MA back in June but something went wrong and it hasn't gone through. Would like to apply again.

## 2024-01-03 ENCOUNTER — PATIENT OUTREACH (OUTPATIENT)
Dept: CARE COORDINATION | Facility: CLINIC | Age: 64
End: 2024-01-03

## 2024-01-03 NOTE — PROGRESS NOTES
Clinic Care Coordination Contact    Situation: Patient chart reviewed by care coordinator.    Background: Pt is enrolled in Care Coordination and followed to assist with goal(s) progression.     Assessment: Per Chart Review- CHW is in the process of outreaching to pt.    Plan/Recommendations: [facility] will review chart for pt updates, goal progression, and/or needs after CHW has made contact with pt.     Ketan Desir DELFINA  Clinic Care Coordinator  [facility]  [facility]  [facility]  [phone]  [email or web address]

## 2024-01-04 ENCOUNTER — PATIENT OUTREACH (OUTPATIENT)
Dept: CARE COORDINATION | Facility: CLINIC | Age: 64
End: 2024-01-04

## 2024-01-04 NOTE — TELEPHONE ENCOUNTER
Clinic Care Coordination Contact  Program: Mnsure, corina care  County: [facility] Case #:  [facility] Worker:   Angela #:   Subscriber #:   Renewal:  Date Applied:     FRW Outreach:   1/4/24- Frw called pt to confirmed her MA approval. Pt didn't answer and couldn't leave a vm as vm was set up. Frw will follow within 30 days for last attempt.  Shira Dinero  Financial Resource Worker  [facility] [place] Care Coordination  [phone]     12/28/23- Frw called pt to confirmed her MA approval. Pt didn't answer and couldn't leave a vm as vm was set up. Frw will follow within 30 days.  Shira Dinero  Financial Resource Worker  [facility] [place] Care Coordination  [phone]     11/27/23- Frw received a vm from pt so called her back. Pt stated that she needs help with Mnsure as well as corina care for her and her son Aris Alvarez. Frw called [facility] to make sure there is nothing active or pending and retrieved user/pass for pt mnsure account. Next, frw completed mnsure application online and pt and her son are both currently pending for MA starting 8/1/23 due wage from job. Pt state that her and son income are 0 for 2023 as well 2024. Frw informed to call the county and clear it over the phone otherwise they will not get the card mailed. Pt state that she will call tomorrow. Frw then assisted pt in completing corina care for her and her son. Frw email the pre-filed application as well as mailing/email instruction and required verification. Frw will follow up within 30 days.  Shira Dinero  Financial Resource Worker  [facility] [place] Care Coordination  [phone]     11/24/23- Outreach attempted x 1. Left message on voicemail with call back information and requested return call.  Plan: FRW will call again within one week.   Shira Dinero  Financial Resource Worker  [employer] [facility] Care Coordination  [phone]         [facility]  Insurance:      Referral/Screening:  FRW Screening      Row Name 11/22/23 5963       [facility] Benefits   Is patient requesting help applying for [facility] benefits? No       [place]:   [place]       Is this an insurance renewal? No       Is this a new insurance application request? Yes       Have you recently applied for insurance? Yes       What date did you apply for insurance? June 2023       How many people in your household? 2       Do you file taxes? Yes       How many dependents do you claim? 1       What is the monthly gross income for the household (wages, social security, workers comp, and pension)? 0       OTHER   Is this a corina care application? No       Any other information for the FRW? Considering retirment/disability and Ex spouse death benefits. Pt has no income. Says she applied for MA back in June but something went wrong and it hasn't gone through. Would like to apply again.

## 2024-01-11 ENCOUNTER — PATIENT OUTREACH (OUTPATIENT)
Dept: CARE COORDINATION | Facility: CLINIC | Age: 64
End: 2024-01-11

## 2024-01-11 NOTE — TELEPHONE ENCOUNTER
Clinic Care Coordination Contact  Program: Mnsure, corina care  County: [facility] Case #:  [facility] Worker:   Angela #:   Subscriber #:   Renewal:  Date Applied:     FRW Outreach:   1/11/24- Frw called pt for the last attempt to follow up on her Mnsure application. Pt didn't answer so left a vm. Pt is approve for MA start 8/1/23. Frw will email [employer] to reprocess and outstanding claims and notify CHW/ SW. Frw will also close referral and remove pt from panel.   Shira Dinero  Financial Resource Worker  [facility] Care Coordination  [phone]     1/4/24- Frw called pt to confirmed her MA approval. Pt didn't answer and couldn't leave a vm as vm was set up. Frw will follow within 30 days for last attempt.  Shira Dinero  Financial Resource Worker  [facility] Care Coordination  [phone]     12/28/23- Frw called pt to confirmed her MA approval. Pt didn't answer and couldn't leave a vm as vm was set up. Frw will follow within 30 days.  Shira Dinero  Financial Resource Worker  [facility] Care Coordination  [phone]     11/27/23- Frw received a vm from pt so called her back. Pt stated that she needs help with Mnsure as well as corina care for her and her son Aris Alvarez. Frw called [facility] to make sure there is nothing active or pending and retrieved user/pass for pt mnsure account. Next, frw completed mnsure application online and pt and her son are both currently pending for MA starting 8/1/23 due wage from job. Pt state that her and son income are 0 for 2023 as well 2024. Frw informed to call the county and clear it over the phone otherwise they will not get the card mailed. Pt state that she will call tomorrow. Frw then assisted pt in completing corina care for her and her son. Frw email the pre-filed application as well as mailing/email instruction and required verification. Frw will follow up within 30 days.  Shira Dinero  [employer]  Worker  [facility] Care Coordination  [phone]     11/24/23- Outreach attempted x 1. Left message on voicemail with call back information and requested return call.  Plan: FRW will call again within one week.   Shira Dinero  Financial Resource Worker  [facility] Care Coordination  [phone]         Health Insurance:      Referral/Screening:  FRW Screening      Row Name 11/22/23 1206       [facility] Benefits   Is patient requesting help applying for [facility] benefits? No       [place]:   [place]       Is this an insurance renewal? No       Is this a new insurance application request? Yes       Have you recently applied for insurance? Yes       What date did you apply for insurance? June 2023       How many people in your household? 2       Do you file taxes? Yes       How many dependents do you claim? 1       What is the monthly gross income for the household (wages, social security, workers comp, and pension)? 0       OTHER   Is this a corina care application? No       Any other information for the FRW? Considering retirment/disability and Ex spouse death benefits. Pt has no income. Says she applied for MA back in June but something went wrong and it hasn't gone through. Would like to apply again.

## 2024-01-12 NOTE — PROGRESS NOTES
Clinic Care Coordination Contact    Situation: Patient chart reviewed by care coordinator.    Background: Pt is enrolled in care coordination and followed to assist with goal(s) progression. Chart routed to [facility] by W for review to determine eligibility of diserolling from Care Coordination per standard work.     Assessment: Per Chart Review pt has been unreachable for follow up x3 with no further engagement or return calls.     Plan/Recommendations: Per Care Coordination standard work pt will be disenrolled from Care Coordination. Care Coordinator will send disenrollment letter with care coordinator contact information via [employer]. Care Coordinator will remain available, however, will do no further outreaches at this time unless a new referral is made or a change it pt status occurs. Pt has been provided with this writer's contact information and has been encouraged to call with any questions.     Ketan Desir [facility]  Clinic Care Coordinator  [facility]  [facility]  [facility]  [phone]  [email or web address]

## 2024-01-29 ENCOUNTER — TELEPHONE (OUTPATIENT)
Dept: FAMILY MEDICINE | Facility: CLINIC | Age: 64
End: 2024-01-29

## 2024-01-29 NOTE — CONFIDENTIAL NOTE
Patient Quality Outreach    Patient is due for the following:   Colon Cancer Screening  Breast Cancer Screening - Mammogram  IVD  -  LDL (Fasting)  Physical Preventive Adult Physical    Next Steps:   Schedule a Adult Preventative    Type of outreach:    Sent [employer] message.      Questions for provider review:    None           Choco Wilhelm MA

## 2024-01-29 NOTE — LETTER
[facility]  [address] [address] [address] [phone]       May 13, 2024    Joanne Padilla  [address] [place] [address]    Dear Joanne,    We care about your health and have reviewed your health plan and are making recommendations based on this review, to optimize your health.    You are in particular need of attention regarding:  -Coronary Artery and/or Vascular Disease  -Breast Cancer Screening  -Colon Cancer Screening  -Wellness (Physical) Visit     We are recommending that you:  -schedule a WELLNESS (Physical) APPOINTMENT with me.   I will check fasting labs the same day - nothing to eat except water and meds for 8-10 hours prior.      -schedule a MAMMOGRAM which is due.    1 in 8 women will develop invasive breast cancer during her lifetime and it is the most common non-skin cancer in American women.  EARLY detection, new treatments, and a better understanding of the disease have increased survival rates - the 5 year survival rate in the 1960s was 63% and today it is close to 90%.    If you are under/uninsured, we recommend you contact the [facility]. They offer mammograms at no charge or on a sliding fee charge. You can schedule with them at [phone]. Please have them send us the results.      Please disregard this reminder if you have had this exam elsewhere within the last year.  It would be helpful for us to have a copy of your mammogram report in your file so that we can best coordinate your care - please contact us with when your test was done so we can update your record.             -schedule a COLONOSCOPY to look for colon cancer (due every 10 years or 5 years in higher risk situations.)        Colon cancer is now the second leading cause of cancer-related deaths in the [place] for both men and women and there are over 130,000 new cases and 50,000 deaths per year from colon cancer.  Colonoscopies can prevent 90-95% of these  deaths.  Problem lesions can be removed before they ever become cancer.  This test is not only looking for cancer, but also getting rid of precancerious lesions.    If you are under/uninsured, we recommend you contact the [employer] program. [employer] is a free colorectal cancer screening program that provides colonoscopies for eligible under/uninsured [place] men and women. If you are interested in receiving a free colonoscopy, please call [employer] at [phone] (mention code [employer]) to see if you re eligible.      If you do not wish to do a colonoscopy or cannot afford to do one, at this time, there is another option. It is called a FIT test or Fecal Immunochemical Occult Blood Test (take home stool sample kit).  It does not replace the colonoscopy for colorectal cancer screening, but it can detect hidden bleeding in the lower colon.  It does need to be repeated every year and if a positive result is obtained, you would be referred for a colonoscopy.          If you have completed either one of these tests at another facility, please call with the details of when and where the tests were done and if they were normal or not. Or have the records sent to our clinic so that we can best coordinate your care.    Also, if you are smoking still and would like some help, then please contact us or make an appointment to discuss your options ([email or web address] has very good free information.)                                                            In addition, here is a list of due or overdue Health Maintenance reminders.    Health Maintenance Due   Topic Date Due    Asthma Action Plan - yearly  Never done    Zoster (Shingles) Vaccine (1 of 2) Never done    RSV VACCINE (Pregnancy & 60+) (1 - 1-dose 60+ series) Never done    Cholesterol Lab  10/26/2022    Yearly Preventive Visit  12/10/2022    Pneumococcal Vaccine (2 of 2 - PCV) 12/10/2022    Colorectal Cancer Screening  12/12/2022    Diptheria Tetanus Pertussis  (DTAP/TDAP/TD) Vaccine (2 - Td or Tdap) 07/01/2023    COVID-19 Vaccine (3 - 2023-24 season) 09/01/2023    ANNUAL REVIEW OF HM ORDERS  12/08/2023    Mammogram  12/10/2023    PHQ-2 (once per calendar year)  01/01/2024    Asthma Control Test  05/07/2024       To address the above recommendations, we encourage you to contact us at [phone], via [employer] or by contacting Central Sched[facility] toll free at [phone] 24 hours a day. They will assist you with finding the most convenient time and location.    Thank you for trusting [facility] and we appreciate the opportunity to serve you.  We look forward to supporting your healthcare needs in the future.    Healthy Regards,    Your [facility] Team

## 2024-01-29 NOTE — LETTER
[facility]  [address] [address] [id number] [phone]       February 12, 2024    Joanne Padilla  [address] [place] [address]    Dear Joanne,    We care about your health and have reviewed your health plan and are making recommendations based on this review, to optimize your health.    You are in particular need of attention regarding:  -Coronary Artery and/or Vascular Disease  -Colon Cancer Screening  -Wellness (Physical) Visit     We are recommending that you:  -schedule a WELLNESS (Physical) APPOINTMENT with me.   I will check fasting labs the same day - nothing to eat except water and meds for 8-10 hours prior.      -schedule a COLONOSCOPY to look for colon cancer (due every 10 years or 5 years in higher risk situations.)        Colon cancer is now the second leading cause of cancer-related deaths in the [place] for both men and women and there are over 130,000 new cases and 50,000 deaths per year from colon cancer.  Colonoscopies can prevent 90-95% of these deaths.  Problem lesions can be removed before they ever become cancer.  This test is not only looking for cancer, but also getting rid of precancerious lesions.    If you are under/uninsured, we recommend you contact the [employer] program. [employer] is a free colorectal cancer screening program that provides colonoscopies for eligible under/uninsured [place] men and women. If you are interested in receiving a free colonoscopy, please call [employer] at [phone] (mention code [employer]) to see if you re eligible.      If you do not wish to do a colonoscopy or cannot afford to do one, at this time, there is another option. It is called a FIT test or Fecal Immunochemical Occult Blood Test (take home stool sample kit).  It does not replace the colonoscopy for colorectal cancer screening, but it can detect hidden bleeding in the lower colon.  It does need to be repeated every year and  if a positive result is obtained, you would be referred for a colonoscopy.          If you have completed either one of these tests at another facility, please call with the details of when and where the tests were done and if they were normal or not. Or have the records sent to our clinic so that we can best coordinate your care.    In addition, here is a list of due or overdue Health Maintenance reminders.    Health Maintenance Due   Topic Date Due    Asthma Action Plan - yearly  Never done    Zoster (Shingles) Vaccine (1 of 2) Never done    RSV VACCINE (Pregnancy & 60+) (1 - 1-dose 60+ series) Never done    Yearly Preventive Visit  12/10/2022    Pneumococcal Vaccine (2 of 2 - PCV) 12/10/2022    Colorectal Cancer Screening  12/12/2022    Diptheria Tetanus Pertussis (DTAP/TDAP/TD) Vaccine (2 - Td or Tdap) 07/01/2023    Flu Vaccine (1) 09/01/2023    COVID-19 Vaccine (3 - 2023-24 season) 09/01/2023    ANNUAL REVIEW OF HM ORDERS  12/08/2023    Mammogram  12/10/2023    PHQ-2 (once per calendar year)  01/01/2024       To address the above recommendations, we encourage you to contact us at [phone], via [employer] or by contacting Central Scheduling toll free at [phone] 24 hours a day. They will assist you with finding the most convenient time and location.    Thank you for trusting [facility] and we appreciate the opportunity to serve you.  We look forward to supporting your healthcare needs in the future.    Healthy Regards,    Your [facility] Team   08-Oct-2018 11:47

## 2024-02-09 ENCOUNTER — TELEPHONE (OUTPATIENT)
Dept: FAMILY MEDICINE | Facility: CLINIC | Age: 64
End: 2024-02-09

## 2024-02-09 NOTE — TELEPHONE ENCOUNTER
I tried the ICD 10 code given of F98.8 and that does not work.  I reached out to the [facility] and that is the correct ICD 10 code for the patient.     Thank you,  Paula Mercado PharmD  [facility] [place]

## 2024-02-12 NOTE — CONFIDENTIAL NOTE
Patient Quality Outreach    Patient is due for the following:   Colon Cancer Screening  IVD  -  IVD Follow-up Visit  Physical Preventive Adult Physical    Next Steps:   Schedule a Adult Preventative    Type of outreach:    Sent letter.    Next Steps:  Reach out within 90 days via Letter.    Max number of attempts reached: Yes. Will try again in 90 days if patient still on fail list.    Questions for provider review:    None           Choco Wilhelm MA

## 2024-02-22 NOTE — TELEPHONE ENCOUNTER
Prior Authorization Not Needed per Pharmacy They have a paid claim dated 02/09/2024.  They were not sure why this was sent to us.    Medication: AMPHETAMINE-DEXTROAMPHETAMINE 20 MG PO TABS  Insurance Company:    Expected CoPay: $    Pharmacy Filling the Rx: [facility] [place], [place] - [address] [address]  Pharmacy Notified: Yes  Patient Notified: Pharmacy picked up on 02/09/2024.

## 2024-03-05 ENCOUNTER — OFFICE VISIT (OUTPATIENT)
Dept: URGENT CARE | Facility: URGENT CARE | Age: 64
End: 2024-03-05
Payer: COMMERCIAL

## 2024-03-05 VITALS
DIASTOLIC BLOOD PRESSURE: 74 MMHG | SYSTOLIC BLOOD PRESSURE: 110 MMHG | HEART RATE: 101 BPM | RESPIRATION RATE: 16 BRPM | TEMPERATURE: 98.3 F | OXYGEN SATURATION: 98 %

## 2024-03-05 DIAGNOSIS — Z87.19 HX OF DIVERTICULITIS OF COLON: ICD-10-CM

## 2024-03-05 DIAGNOSIS — R10.32 ABDOMINAL PAIN, LEFT LOWER QUADRANT: Primary | ICD-10-CM

## 2024-03-05 LAB
ALBUMIN UR-MCNC: NEGATIVE MG/DL
APPEARANCE UR: CLEAR
BACTERIA #/AREA URNS HPF: ABNORMAL /HPF
BASOPHILS # BLD AUTO: 0 10E3/UL (ref 0–0.2)
BASOPHILS NFR BLD AUTO: 0 %
BILIRUB UR QL STRIP: NEGATIVE
COLOR UR AUTO: YELLOW
EOSINOPHIL # BLD AUTO: 0.1 10E3/UL (ref 0–0.7)
EOSINOPHIL NFR BLD AUTO: 1 %
ERYTHROCYTE [DISTWIDTH] IN BLOOD BY AUTOMATED COUNT: 13.5 % (ref 10–15)
GLUCOSE UR STRIP-MCNC: NEGATIVE MG/DL
HCT VFR BLD AUTO: 46.4 % (ref 35–47)
HGB BLD-MCNC: 15.3 G/DL (ref 11.7–15.7)
HGB UR QL STRIP: ABNORMAL
IMM GRANULOCYTES # BLD: 0 10E3/UL
IMM GRANULOCYTES NFR BLD: 0 %
KETONES UR STRIP-MCNC: NEGATIVE MG/DL
LEUKOCYTE ESTERASE UR QL STRIP: ABNORMAL
LYMPHOCYTES # BLD AUTO: 1.5 10E3/UL (ref 0.8–5.3)
LYMPHOCYTES NFR BLD AUTO: 16 %
MCH RBC QN AUTO: 30.7 PG (ref 26.5–33)
MCHC RBC AUTO-ENTMCNC: 33 G/DL (ref 31.5–36.5)
MCV RBC AUTO: 93 FL (ref 78–100)
MONOCYTES # BLD AUTO: 0.5 10E3/UL (ref 0–1.3)
MONOCYTES NFR BLD AUTO: 6 %
NEUTROPHILS # BLD AUTO: 7.4 10E3/UL (ref 1.6–8.3)
NEUTROPHILS NFR BLD AUTO: 77 %
NITRATE UR QL: NEGATIVE
PH UR STRIP: 5.5 [PH] (ref 5–7)
PLATELET # BLD AUTO: 301 10E3/UL (ref 150–450)
RBC # BLD AUTO: 4.98 10E6/UL (ref 3.8–5.2)
RBC #/AREA URNS AUTO: ABNORMAL /HPF
SP GR UR STRIP: 1.02 (ref 1–1.03)
SQUAMOUS #/AREA URNS AUTO: ABNORMAL /LPF
UROBILINOGEN UR STRIP-ACNC: 0.2 E.U./DL
WBC # BLD AUTO: 9.6 10E3/UL (ref 4–11)
WBC #/AREA URNS AUTO: ABNORMAL /HPF

## 2024-03-05 PROCEDURE — 80053 COMPREHEN METABOLIC PANEL: CPT | Performed by: PHYSICIAN ASSISTANT

## 2024-03-05 PROCEDURE — 99214 OFFICE O/P EST MOD 30 MIN: CPT | Performed by: PHYSICIAN ASSISTANT

## 2024-03-05 PROCEDURE — 85025 COMPLETE CBC W/AUTO DIFF WBC: CPT | Performed by: PHYSICIAN ASSISTANT

## 2024-03-05 PROCEDURE — 81001 URINALYSIS AUTO W/SCOPE: CPT | Performed by: PHYSICIAN ASSISTANT

## 2024-03-05 PROCEDURE — 36415 COLL VENOUS BLD VENIPUNCTURE: CPT | Performed by: PHYSICIAN ASSISTANT

## 2024-03-05 NOTE — PROGRESS NOTES
Assessment & Plan     Abdominal pain, left lower quadrant  Acute problem.  On exam patient is nontoxic-appearing.  Patient is in no acute distress.  Vitals are stable.  Patient is afebrile.  CBC today is unremarkable.  Urinalysis not suggestive of infection.  Patient with history of diverticulitis.  Findings on exam suggestive of diverticulitis flare.  We have elected to started treatment as outpatient today.  Augmentin is prescribed today.  Advised to keep monitoring symptoms.  Patient educational information provided regarding course of symptoms.  We discussed red flag symptoms and when to seek emergent care.  CMP is pending.  Patient will be notified if any abnormal results.  Follow-up as needed.  Patient agrees with the plan.  - CBC with platelets and differential  - amoxicillin-clavulanate (AUGMENTIN) 875-125 MG tablet  Dispense: 20 tablet; Refill: 0  - Comprehensive metabolic panel (BMP + Alb, Alk Phos, ALT, AST, Total. Bili, TP)  - UA Macroscopic with reflex to Microscopic and Culture - Lab Collect      Hx of diverticulitis of colon  - amoxicillin-clavulanate (AUGMENTIN) 875-125 MG tablet  Dispense: 20 tablet; Refill: 0       31 minutes spent on the date of the encounter doing chart review, history and exam, patient education, documentation, and further activities per the note.      Return in about 5 days (around 3/10/2024) for Symptoms failing to improve.    Philly Quintanilla PA-C  [facility] [place] is a 63 year old female who presents to clinic today for the following health issues:  Chief Complaint   Patient presents with    Abdominal Pain     Yesterday, lower left abdomen-constant pain, has diverticulitis before     HPI      Abdominal Pain    Location: LLQ   Radiation: None.    Pain character: sharp   Duration: 1 day(s)   Course of Illness: slowly progressive, pain started gradually yesterday, was intermittent now pain is constant  Exacerbated by:  movement/walking  Relieved by: nothing.  Associated Symptoms: nausea.  Denies: vomiting, diarrhea, melena, hematochezia, dysuria, frequency, hematuria, and chills, fever  Female : denies abnormal vaginal discharge  Surgical History: cholecystectomy  Patient notes bowel movements have been normal.      Review of Systems  Constitutional, HEENT, cardiovascular, pulmonary, GI, , musculoskeletal, neuro, skin, endocrine and psych systems are negative, except as otherwise noted.      Objective    /74   Pulse 101   Temp 98.3  F (36.8  C)   Resp 16   LMP  (LMP Unknown)   SpO2 98%   Physical Exam   GENERAL: alert and no distress  RESP: lungs clear to auscultation - no rales, rhonchi or wheezes  CV: regular rate and rhythm, normal S1 S2  ABDOMEN: soft, TTP LLQ, no hepatosplenomegaly, no masses and bowel sounds normal  MS: no gross musculoskeletal defects noted, no edema  SKIN: no suspicious lesions or rashes    Results for orders placed or performed in visit on 03/05/24 (from the past 24 hour(s))   CBC with platelets and differential    Narrative    The following orders were created for panel order CBC with platelets and differential.  Procedure                               Abnormality         Status                     ---------                               -----------         ------                     CBC with platelets and d...[[id number]]                      Final result                 Please view results for these tests on the individual orders.   UA Macroscopic with reflex to Microscopic and Culture - Lab Collect    Specimen: Urine, Clean Catch   Result Value Ref Range    Color Urine Yellow Colorless, Straw, Light Yellow, Yellow    Appearance Urine Clear Clear    Glucose Urine Negative Negative mg/dL    Bilirubin Urine Negative Negative    Ketones Urine Negative Negative mg/dL    Specific Gravity Urine 1.025 1.003 - 1.035    Blood Urine Moderate (A) Negative    pH Urine 5.5 5.0 - 7.0    Protein Albumin  Urine Negative Negative mg/dL    Urobilinogen Urine 0.2 0.2, 1.0 E.U./dL    Nitrite Urine Negative Negative    Leukocyte Esterase Urine Small (A) Negative   CBC with platelets and differential   Result Value Ref Range    WBC Count 9.6 4.0 - 11.0 10e3/uL    RBC Count 4.98 3.80 - 5.20 10e6/uL    Hemoglobin 15.3 11.7 - 15.7 g/dL    Hematocrit 46.4 35.0 - 47.0 %    MCV 93 78 - 100 fL    MCH 30.7 26.5 - 33.0 pg    MCHC 33.0 31.5 - 36.5 g/dL    RDW 13.5 10.0 - 15.0 %    Platelet Count 301 150 - 450 10e3/uL    % Neutrophils 77 %    % Lymphocytes 16 %    % Monocytes 6 %    % Eosinophils 1 %    % Basophils 0 %    % Immature Granulocytes 0 %    Absolute Neutrophils 7.4 1.6 - 8.3 10e3/uL    Absolute Lymphocytes 1.5 0.8 - 5.3 10e3/uL    Absolute Monocytes 0.5 0.0 - 1.3 10e3/uL    Absolute Eosinophils 0.1 0.0 - 0.7 10e3/uL    Absolute Basophils 0.0 0.0 - 0.2 10e3/uL    Absolute Immature Granulocytes 0.0 <=0.4 10e3/uL   UA Microscopic with Reflex to Culture   Result Value Ref Range    Bacteria Urine Few (A) None Seen /HPF    RBC Urine 5-10 (A) 0-2 /HPF /HPF    WBC Urine 0-5 0-5 /HPF /HPF    Squamous Epithelials Urine Few (A) None Seen /LPF    Narrative    Urine Culture not indicated

## 2024-03-06 LAB
ALBUMIN SERPL BCG-MCNC: 4.2 G/DL (ref 3.5–5.2)
ALP SERPL-CCNC: 85 U/L (ref 40–150)
ALT SERPL W P-5'-P-CCNC: 17 U/L (ref 0–50)
ANION GAP SERPL CALCULATED.3IONS-SCNC: 9 MMOL/L (ref 7–15)
AST SERPL W P-5'-P-CCNC: 22 U/L (ref 0–45)
BILIRUB SERPL-MCNC: 0.7 MG/DL
BUN SERPL-MCNC: 16.8 MG/DL (ref 8–23)
CALCIUM SERPL-MCNC: 9.1 MG/DL (ref 8.8–10.2)
CHLORIDE SERPL-SCNC: 104 MMOL/L (ref 98–107)
CREAT SERPL-MCNC: 0.67 MG/DL (ref 0.51–0.95)
DEPRECATED HCO3 PLAS-SCNC: 23 MMOL/L (ref 22–29)
EGFRCR SERPLBLD CKD-EPI 2021: >90 ML/MIN/1.73M2
GLUCOSE SERPL-MCNC: 97 MG/DL (ref 70–99)
POTASSIUM SERPL-SCNC: 4.9 MMOL/L (ref 3.4–5.3)
PROT SERPL-MCNC: 6.9 G/DL (ref 6.4–8.3)
SODIUM SERPL-SCNC: 136 MMOL/L (ref 135–145)

## 2024-03-10 DIAGNOSIS — F98.8 ATTENTION DEFICIT DISORDER (ADD) WITHOUT HYPERACTIVITY: ICD-10-CM

## 2024-03-10 RX ORDER — DEXTROAMPHETAMINE SACCHARATE, AMPHETAMINE ASPARTATE, DEXTROAMPHETAMINE SULFATE AND AMPHETAMINE SULFATE 5; 5; 5; 5 MG/1; MG/1; MG/1; MG/1
20 TABLET ORAL 2 TIMES DAILY
Qty: 60 TABLET | Refills: 0 | Status: SHIPPED | OUTPATIENT
Start: 2024-03-10 | End: 2024-04-04

## 2024-03-23 ENCOUNTER — HEALTH MAINTENANCE LETTER (OUTPATIENT)
Age: 64
End: 2024-03-23

## 2024-04-04 DIAGNOSIS — F98.8 ATTENTION DEFICIT DISORDER (ADD) WITHOUT HYPERACTIVITY: ICD-10-CM

## 2024-04-04 RX ORDER — DEXTROAMPHETAMINE SACCHARATE, AMPHETAMINE ASPARTATE, DEXTROAMPHETAMINE SULFATE AND AMPHETAMINE SULFATE 5; 5; 5; 5 MG/1; MG/1; MG/1; MG/1
20 TABLET ORAL 2 TIMES DAILY
Qty: 60 TABLET | Refills: 0 | Status: SHIPPED | OUTPATIENT
Start: 2024-04-09 | End: 2024-05-13

## 2024-05-08 ENCOUNTER — OFFICE VISIT (OUTPATIENT)
Dept: URGENT CARE | Facility: URGENT CARE | Age: 64
End: 2024-05-08
Payer: COMMERCIAL

## 2024-05-08 VITALS
DIASTOLIC BLOOD PRESSURE: 86 MMHG | OXYGEN SATURATION: 95 % | TEMPERATURE: 97.3 F | SYSTOLIC BLOOD PRESSURE: 122 MMHG | HEART RATE: 88 BPM

## 2024-05-08 DIAGNOSIS — J01.90 ACUTE SINUSITIS WITH SYMPTOMS > 10 DAYS: Primary | ICD-10-CM

## 2024-05-08 DIAGNOSIS — J02.9 ACUTE SORE THROAT: ICD-10-CM

## 2024-05-08 LAB
DEPRECATED S PYO AG THROAT QL EIA: NEGATIVE
GROUP A STREP BY PCR: NOT DETECTED

## 2024-05-08 PROCEDURE — 87651 STREP A DNA AMP PROBE: CPT | Performed by: PHYSICIAN ASSISTANT

## 2024-05-08 PROCEDURE — 99213 OFFICE O/P EST LOW 20 MIN: CPT | Performed by: PHYSICIAN ASSISTANT

## 2024-05-08 NOTE — PROGRESS NOTES
Assessment & Plan     Acute sinusitis with symptoms > 10 days  Augmentin Rx.  Okay to continue saline nasal rinses. Tylenol or motrin prn headache . Follow up if any worsening symptoms. Patient agrees.     - amoxicillin-clavulanate (AUGMENTIN) 875-125 MG tablet  Dispense: 14 tablet; Refill: 0    Acute sore throat  We discussed due to postnasal drip.  No evidence of peritonsillar abscess. Rapid strep is negative today.  Throat culture is pending.  Supportive care measures advised: Tylenol/motrin.  We will communicate any positive finding on the throat culture result.  Follow-up if any worsening symptoms.  Patient understands and agrees with the plan.    - Streptococcus A Rapid Screen w/Reflex to PCR - Clinic Collect  - Group A Streptococcus PCR Throat Swab       Return in about 1 week (around 5/15/2024) for Symptoms failing to improve.    Philly Quintanilla PA-C  [facility] URGENT CARE [place] Rubio is a 63 year old female who presents to clinic today for the following health issues:  Chief Complaint   Patient presents with    Urgent Care     Sore throat, cough and congestion x2 weeks - worse in the last 3 days     HPI    URI Adult    Onset of symptoms was 2 week(s) ago.  Course of illness is worsening.    Severity moderate  Current and Associated symptoms: facial pain/pressure, headache, post nasal drainage, cough, nasal congestion, ST  No fever  Treatment measures tried include nasal saline rinses.  Predisposing factors include None.      Review of Systems  Constitutional, HEENT, cardiovascular, pulmonary, GI, , musculoskeletal, neuro, skin, endocrine and psych systems are negative, except as otherwise noted.      Objective    /86   Pulse 88   Temp 97.3  F (36.3  C) (Tympanic)   LMP  (LMP Unknown)   SpO2 95%   Physical Exam   GENERAL: alert and no distress  HENT: ear canals and TM's normal, nose with boggy turbinates, scant amount of dried blood noted in the right nostril,  maxillary sinuses are tender to percussion and mouth without ulcers or lesions, throat is moist and pink, uvula is midline  RESP: lungs clear to auscultation - no rales, rhonchi or wheezes  CV: regular rate and rhythm, normal S1 S2  MS: no gross musculoskeletal defects noted, no edema  SKIN: no suspicious lesions or rashes    Results for orders placed or performed in visit on 05/08/24 (from the past 24 hour(s))   Streptococcus A Rapid Screen w/Reflex to PCR - Clinic Collect    Specimen: Throat; Swab   Result Value Ref Range    Group A Strep antigen Negative Negative

## 2024-05-10 DIAGNOSIS — F98.8 ATTENTION DEFICIT DISORDER (ADD) WITHOUT HYPERACTIVITY: ICD-10-CM

## 2024-05-13 RX ORDER — DEXTROAMPHETAMINE SACCHARATE, AMPHETAMINE ASPARTATE, DEXTROAMPHETAMINE SULFATE AND AMPHETAMINE SULFATE 5; 5; 5; 5 MG/1; MG/1; MG/1; MG/1
20 TABLET ORAL 2 TIMES DAILY
Qty: 60 TABLET | Refills: 0 | Status: SHIPPED | OUTPATIENT
Start: 2024-05-13 | End: 2024-06-11

## 2024-05-13 NOTE — CONFIDENTIAL NOTE
Patient Quality Outreach    Patient is due for the following:   Colon Cancer Screening  Breast Cancer Screening - Mammogram  IVD  -  IVD Follow-up Visit  Physical Preventive Adult Physical    Next Steps:   Schedule a Adult Preventative    Type of outreach:    Sent letter.      Questions for provider review:    None           Choco Wilhelm MA

## 2024-06-01 ENCOUNTER — HEALTH MAINTENANCE LETTER (OUTPATIENT)
Age: 64
End: 2024-06-01

## 2024-06-07 ENCOUNTER — PATIENT OUTREACH (OUTPATIENT)
Dept: CARE COORDINATION | Facility: CLINIC | Age: 64
End: 2024-06-07
Payer: COMMERCIAL

## 2024-06-10 DIAGNOSIS — F98.8 ATTENTION DEFICIT DISORDER (ADD) WITHOUT HYPERACTIVITY: ICD-10-CM

## 2024-06-10 NOTE — TELEPHONE ENCOUNTER
Joanne calls back, she is scheduled for a virtual visit 6/13/24 with Sacha.  Routing back to provider to send refill.      Sunita ROWAN, - Flex  [facility]

## 2024-06-10 NOTE — TELEPHONE ENCOUNTER
LVM x1- see provider message below.      Sunita ROWAN, - Flex  Primary Care- ADS, [facility]

## 2024-06-10 NOTE — TELEPHONE ENCOUNTER
Patient is overdue for 6 month med check to continue on adderall. Please help schedule and then I can fill (please route back to me when scheduled)

## 2024-06-11 RX ORDER — DEXTROAMPHETAMINE SACCHARATE, AMPHETAMINE ASPARTATE, DEXTROAMPHETAMINE SULFATE AND AMPHETAMINE SULFATE 5; 5; 5; 5 MG/1; MG/1; MG/1; MG/1
20 TABLET ORAL 2 TIMES DAILY
Qty: 60 TABLET | Refills: 0 | Status: SHIPPED | OUTPATIENT
Start: 2024-06-11 | End: 2024-07-09

## 2024-06-12 NOTE — TELEPHONE ENCOUNTER
Pt has called back about a message that was left about her medication.     Pt is scheduled w/ Ric Goncalves for tomorrow. Medication has been refilled as of 06/11/24.     Yu Sparks     [facility]

## 2024-06-13 ENCOUNTER — VIRTUAL VISIT (OUTPATIENT)
Dept: FAMILY MEDICINE | Facility: CLINIC | Age: 64
End: 2024-06-13
Payer: COMMERCIAL

## 2024-06-13 DIAGNOSIS — F51.01 PRIMARY INSOMNIA: ICD-10-CM

## 2024-06-13 DIAGNOSIS — I70.0 ATHEROSCLEROSIS OF ABDOMINAL AORTA (H): ICD-10-CM

## 2024-06-13 DIAGNOSIS — Z72.0 TOBACCO USE: ICD-10-CM

## 2024-06-13 DIAGNOSIS — Z87.820 HISTORY OF TRAUMATIC BRAIN INJURY: ICD-10-CM

## 2024-06-13 DIAGNOSIS — F98.8 ATTENTION DEFICIT DISORDER (ADD) WITHOUT HYPERACTIVITY: Primary | ICD-10-CM

## 2024-06-13 DIAGNOSIS — Z12.31 ENCOUNTER FOR SCREENING MAMMOGRAM FOR BREAST CANCER: ICD-10-CM

## 2024-06-13 DIAGNOSIS — Z71.6 ENCOUNTER FOR SMOKING CESSATION COUNSELING: ICD-10-CM

## 2024-06-13 PROCEDURE — 99214 OFFICE O/P EST MOD 30 MIN: CPT | Mod: 95 | Performed by: PHYSICIAN ASSISTANT

## 2024-06-13 RX ORDER — TRAZODONE HYDROCHLORIDE 50 MG/1
50-100 TABLET, FILM COATED ORAL AT BEDTIME
Qty: 90 TABLET | Refills: 0 | Status: SHIPPED | OUTPATIENT
Start: 2024-06-13

## 2024-06-13 ASSESSMENT — ASTHMA QUESTIONNAIRES
QUESTION_1 LAST FOUR WEEKS HOW MUCH OF THE TIME DID YOUR ASTHMA KEEP YOU FROM GETTING AS MUCH DONE AT WORK, SCHOOL OR AT HOME: A LITTLE OF THE TIME
QUESTION_4 LAST FOUR WEEKS HOW OFTEN HAVE YOU USED YOUR RESCUE INHALER OR NEBULIZER MEDICATION (SUCH AS ALBUTEROL): TWO OR THREE TIMES PER WEEK
QUESTION_2 LAST FOUR WEEKS HOW OFTEN HAVE YOU HAD SHORTNESS OF BREATH: ONCE OR TWICE A WEEK
QUESTION_3 LAST FOUR WEEKS HOW OFTEN DID YOUR ASTHMA SYMPTOMS (WHEEZING, COUGHING, SHORTNESS OF BREATH, CHEST TIGHTNESS OR PAIN) WAKE YOU UP AT NIGHT OR EARLIER THAN USUAL IN THE MORNING: ONCE OR TWICE
ACT_TOTALSCORE: 18
ACT_TOTALSCORE: 18
QUESTION_5 LAST FOUR WEEKS HOW WOULD YOU RATE YOUR ASTHMA CONTROL: SOMEWHAT CONTROLLED

## 2024-06-13 ASSESSMENT — PATIENT HEALTH QUESTIONNAIRE - PHQ9
SUM OF ALL RESPONSES TO PHQ QUESTIONS 1-9: 21
10. IF YOU CHECKED OFF ANY PROBLEMS, HOW DIFFICULT HAVE THESE PROBLEMS MADE IT FOR YOU TO DO YOUR WORK, TAKE CARE OF THINGS AT HOME, OR GET ALONG WITH OTHER PEOPLE: EXTREMELY DIFFICULT
SUM OF ALL RESPONSES TO PHQ QUESTIONS 1-9: 21

## 2024-06-13 NOTE — PROGRESS NOTES
"Joanne is a 63 year old who is being evaluated via a billable video visit.    What phone number would you like to be contacted at? mobile  How would you like to obtain your AVS? MyChart      Assessment & Plan     Attention deficit disorder (ADD) without hyperactivity  Continue present management. Adderall 20mg twice daily; still think likely she could come down on this dose if she were to get more quality sleep    Primary insomnia  I think lack of sleep is a major cause of a lot of Sheila symptoms. She is now open to medication trial. Should also focus on excellent night routine  - traZODone (DESYREL) 50 MG tablet; Take 1-2 tablets ( mg) by mouth at bedtime    Encounter for smoking cessation counseling  Tobacco use  She is motivated  - Quit Partner (Tobacco Cessation) Referral; Future    Encounter for screening mammogram for breast cancer  due  - MA Screen Bilateral w/Gonzalo; Future    History of traumatic brain injury      Atherosclerosis of abdominal aorta (H24)  She is on statin, asa. Needs to stop smoking and is motivated to quit.          BMI  Estimated body mass index is 30.62 kg/m  as calculated from the following:    Height as of 11/7/23: 1.715 m (5' 7.5\").    Weight as of 11/7/23: 90 kg (198 lb 6.4 oz).       Depression Screening Follow Up        6/13/2024     4:39 PM   PHQ   PHQ-9 Total Score 21   Q9: Thoughts of better off dead/self-harm past 2 weeks Not at all             Subjective   Joanne is a 63 year old, presenting for the following health issues:  No chief complaint on file.        11/7/2023    10:21 AM   Additional Questions   Roomed by Luis Enrique GREY   Accompanied by No one       Video Start Time: 5:09 PM    HPI     Joanne Padilla is a 63 year old female who presents today for ADHD follow up   At our last visit Joanne was really suffering from a lot of stressors with job/insurance etc  She worked with our care coordination to help with insurance, billing and job  Losing job was a " blessing because she could help her kids deal with loss of their father  Currently looking for work  Mentions that trying to find/seek help actually turns out to be a greater struggle than its worth it    She continues on adderall 20mg twice daily   This continues to be helpful to help with task completion    Still sleeping only a few hours nightly, max  Some nights none at all        Review of Systems  Constitutional, HEENT, cardiovascular, pulmonary, gi and gu systems are negative, except as otherwise noted.      Objective           Vitals:  No vitals were obtained today due to virtual visit.    Physical Exam   GENERAL: alert and no distress  EYES: Eyes grossly normal to inspection.  No discharge or erythema, or obvious scleral/conjunctival abnormalities.  RESP: No audible wheeze, cough, or visible cyanosis.    SKIN: Visible skin clear. No significant rash, abnormal pigmentation or lesions.  NEURO: Cranial nerves grossly intact.  Mentation and speech appropriate for age.  PSYCH: Appropriate affect, tone, and pace of words        Video-Visit Details    Type of service:  Video Visit   Video End Time:5: 35 PM  Originating Location (pt. Location): Home    Distant Location (provider location):  On-site  Platform used for Video Visit: Jãoo  Signed Electronically by: Fermín Larios PA-C

## 2024-07-09 DIAGNOSIS — F98.8 ATTENTION DEFICIT DISORDER (ADD) WITHOUT HYPERACTIVITY: ICD-10-CM

## 2024-07-09 RX ORDER — DEXTROAMPHETAMINE SACCHARATE, AMPHETAMINE ASPARTATE, DEXTROAMPHETAMINE SULFATE AND AMPHETAMINE SULFATE 5; 5; 5; 5 MG/1; MG/1; MG/1; MG/1
20 TABLET ORAL 2 TIMES DAILY
Qty: 60 TABLET | Refills: 0 | Status: SHIPPED | OUTPATIENT
Start: 2024-07-11 | End: 2024-08-09

## 2024-08-08 DIAGNOSIS — F98.8 ATTENTION DEFICIT DISORDER (ADD) WITHOUT HYPERACTIVITY: ICD-10-CM

## 2024-08-09 RX ORDER — DEXTROAMPHETAMINE SACCHARATE, AMPHETAMINE ASPARTATE, DEXTROAMPHETAMINE SULFATE AND AMPHETAMINE SULFATE 5; 5; 5; 5 MG/1; MG/1; MG/1; MG/1
20 TABLET ORAL 2 TIMES DAILY
Qty: 60 TABLET | Refills: 0 | Status: SHIPPED | OUTPATIENT
Start: 2024-08-10 | End: 2024-09-06

## 2024-09-06 DIAGNOSIS — F98.8 ATTENTION DEFICIT DISORDER (ADD) WITHOUT HYPERACTIVITY: ICD-10-CM

## 2024-09-06 RX ORDER — DEXTROAMPHETAMINE SACCHARATE, AMPHETAMINE ASPARTATE, DEXTROAMPHETAMINE SULFATE AND AMPHETAMINE SULFATE 5; 5; 5; 5 MG/1; MG/1; MG/1; MG/1
20 TABLET ORAL 2 TIMES DAILY
Qty: 60 TABLET | Refills: 0 | Status: SHIPPED | OUTPATIENT
Start: 2024-09-09

## 2024-10-08 DIAGNOSIS — F98.8 ATTENTION DEFICIT DISORDER (ADD) WITHOUT HYPERACTIVITY: ICD-10-CM

## 2024-10-08 RX ORDER — DEXTROAMPHETAMINE SACCHARATE, AMPHETAMINE ASPARTATE, DEXTROAMPHETAMINE SULFATE AND AMPHETAMINE SULFATE 5; 5; 5; 5 MG/1; MG/1; MG/1; MG/1
20 TABLET ORAL 2 TIMES DAILY
Qty: 60 TABLET | Refills: 0 | Status: SHIPPED | OUTPATIENT
Start: 2024-10-08 | End: 2024-11-12

## 2024-10-14 ENCOUNTER — OFFICE VISIT (OUTPATIENT)
Dept: URGENT CARE | Facility: URGENT CARE | Age: 64
End: 2024-10-14
Payer: COMMERCIAL

## 2024-10-14 VITALS
TEMPERATURE: 97 F | OXYGEN SATURATION: 97 % | SYSTOLIC BLOOD PRESSURE: 118 MMHG | HEART RATE: 74 BPM | RESPIRATION RATE: 20 BRPM | DIASTOLIC BLOOD PRESSURE: 76 MMHG

## 2024-10-14 DIAGNOSIS — Z87.19 HISTORY OF DIVERTICULITIS: ICD-10-CM

## 2024-10-14 DIAGNOSIS — R10.32 LLQ ABDOMINAL PAIN: Primary | ICD-10-CM

## 2024-10-14 LAB
ERYTHROCYTE [DISTWIDTH] IN BLOOD BY AUTOMATED COUNT: 14.1 % (ref 10–15)
HCT VFR BLD AUTO: 46.2 % (ref 35–47)
HGB BLD-MCNC: 14.9 G/DL (ref 11.7–15.7)
MCH RBC QN AUTO: 30.6 PG (ref 26.5–33)
MCHC RBC AUTO-ENTMCNC: 32.3 G/DL (ref 31.5–36.5)
MCV RBC AUTO: 95 FL (ref 78–100)
PLATELET # BLD AUTO: 260 10E3/UL (ref 150–450)
RBC # BLD AUTO: 4.87 10E6/UL (ref 3.8–5.2)
WBC # BLD AUTO: 4.8 10E3/UL (ref 4–11)

## 2024-10-14 PROCEDURE — 99213 OFFICE O/P EST LOW 20 MIN: CPT | Performed by: NURSE PRACTITIONER

## 2024-10-14 PROCEDURE — 85027 COMPLETE CBC AUTOMATED: CPT | Performed by: NURSE PRACTITIONER

## 2024-10-14 PROCEDURE — 36415 COLL VENOUS BLD VENIPUNCTURE: CPT | Performed by: NURSE PRACTITIONER

## 2024-10-14 NOTE — PROGRESS NOTES
Assessment & Plan      Diagnosis Comments   1. LLQ abdominal pain  CBC with platelets. Reassuring CBC. Patient will continue to follow a liquid diet for 3 days and transition to a regular diet as tolerated. Will follow up with provider if symptoms persist or develops a fever or had increased pain.     At the end of the encounter, I discussed results, diagnosis. Discussed red flags for immediate return to clinic/ER, as well as indications for follow up if no improvement. Patient understood and agreed to plan. Patient was stable for discharge      If not improving or if condition worsens, follow up with your Primary Care Provider           2. History of diverticulitis  CBC with platelets Discussed following a bland diet and the need for follow up with provider.      Possibly a mild diverticulitis case pain very mild on exam patient appears stable.   DDX: constipation, MSK pain.   Discussed need for physical may benefit from colonoscopy is due for colorectal preventative       Stella Acosta, MSN, FNP Student on 10/14/2024 at 12:30 PM     I saw and evaluated the patient and agree with the findings and plan of care as documented in the note.    [facility]na [facility]lass [facility] URGENT CARE [place]    Adam Rubio is a 64 year old female who presents to clinic today for the following health issues:  Chief Complaint   Patient presents with    Urgent Care     X 2 weeks, frequent urination, lower left abdominal pain, tired. Has had diverticulitis before and symptoms are similar. Has taken some Advil.        ISAIAS Rubio is a 64 year old patient that presents to urgent care today with LLQ pain urinary frequency over the last two weeks. She states that she has had similar pain in the past with diverticulitis. She is tolerating liquids and food but trying to do a liquid diet as much as possible. Denies diarrhea, mentions softer than normal stools, no blood. Nausea present but this is not new. Her urinary  frequency has improved, denies pain or burning. She feels this was due to her increase in fluids.      Review of Systems  Constitutional, HEENT, cardiovascular, pulmonary, gi and gu systems are negative, except as otherwise noted.      Objective    /76   Pulse 74   Temp 97  F (36.1  C)   Resp 20   LMP  (LMP Unknown)   SpO2 97%   Physical Exam   GENERAL: alert and no distress  NECK: no adenopathy, no asymmetry, masses, or scars  RESP: lungs clear to auscultation - no rales, rhonchi or wheezes  CV: regular rate and rhythm, normal S1 S2, no S3 or S4, no murmur, click or rub, no peripheral edema  ABDOMEN: soft, mild LLQ tenderness no guarding, no hepatosplenomegaly, no masses and bowel sounds normal  MS: no gross musculoskeletal defects noted, no edema    Results for orders placed or performed in visit on 10/14/24   CBC with platelets     Status: Normal   Result Value Ref Range    WBC Count 4.8 4.0 - 11.0 10e3/uL    RBC Count 4.87 3.80 - 5.20 10e6/uL    Hemoglobin 14.9 11.7 - 15.7 g/dL    Hematocrit 46.2 35.0 - 47.0 %    MCV 95 78 - 100 fL    MCH 30.6 26.5 - 33.0 pg    MCHC 32.3 31.5 - 36.5 g/dL    RDW 14.1 10.0 - 15.0 %    Platelet Count 260 150 - 450 10e3/uL

## 2024-10-14 NOTE — PATIENT INSTRUCTIONS
Recommend close follow up with you primary care provider for physical and colorectal cancer screening.     Continue liquid diet for 3 days then may start bland soft diet.     If symptoms of blood in stool, worsening pain, nausea recommend being seen through emergency

## 2024-11-07 ENCOUNTER — TELEPHONE (OUTPATIENT)
Dept: FAMILY MEDICINE | Facility: CLINIC | Age: 64
End: 2024-11-07

## 2024-11-07 NOTE — TELEPHONE ENCOUNTER
Patient Quality Outreach    Patient is due for the following:   Colon Cancer Screening  Breast Cancer Screening - Mammogram  Physical Preventive Adult Physical    Next Steps:   Schedule a Adult Preventative    Type of outreach:    Sent FunCaptcha message.      Questions for provider review:    None           Vilma Hernandez MA

## 2024-11-11 DIAGNOSIS — F98.8 ATTENTION DEFICIT DISORDER (ADD) WITHOUT HYPERACTIVITY: ICD-10-CM

## 2024-11-12 RX ORDER — DEXTROAMPHETAMINE SACCHARATE, AMPHETAMINE ASPARTATE, DEXTROAMPHETAMINE SULFATE AND AMPHETAMINE SULFATE 2.5; 2.5; 2.5; 2.5 MG/1; MG/1; MG/1; MG/1
20 TABLET ORAL 2 TIMES DAILY
Qty: 120 TABLET | Refills: 0 | Status: SHIPPED | OUTPATIENT
Start: 2024-11-12

## 2024-11-14 DIAGNOSIS — F90.8 ADHD, ADULT RESIDUAL TYPE: Primary | ICD-10-CM

## 2024-11-14 NOTE — TELEPHONE ENCOUNTER
The ICD 10 code on the prescription is not a covered ICD 10 code. If this is the only ICD 10 for the patient then we will need to do a prior auth.  Please let us know if you have a different ICD 10 code to use or if you will be initiating a prior auth      Thank you,  Paula ThomasD  [facility]

## 2024-11-15 NOTE — TELEPHONE ENCOUNTER
Patient calls stating pharmacy needs paperwork submitted to fill Adderall. Informed patient it appears this is being worked on.    Stella Ferrera RN on 11/15/2024 at 12:34 PM

## 2024-11-15 NOTE — TELEPHONE ENCOUNTER
Sounds like we need a prior auth then. This is the diagnosis that's been used chronically and weve never had issues? Can you help me submit?

## 2024-11-18 NOTE — TELEPHONE ENCOUNTER
Patient calling for an updated. Informed that message has been sent to PA team.    PA needed for: amphetamine-dextroamphetamine (ADDERALL) 10 MG tablet     Stella Ferrera RN on 11/18/2024 at 3:43 PM

## 2024-11-18 NOTE — TELEPHONE ENCOUNTER
Forms/Letter Request    Type of form/letter: OTHER: ABN       Do we have the form/letter: Yes: [place] DHS    Who is the form from? Pharmacy (if other please explain)    Where did/will the form come from? form was faxed in    When is form/letter needed by: ASAP    How would you like the form/letter returned: Fax : [phone]    **Placed in provider's basket for review and signature**    Jenny Allen  Lead   [facility]

## 2024-11-19 NOTE — TELEPHONE ENCOUNTER
After gathering more information on the form, It is believed that the form was faxed with just one page, no second page.     Yu Sparks   [facility]

## 2024-11-19 NOTE — TELEPHONE ENCOUNTER
"Did this come with another page? Under the \"prescriber signature\" section I have to check a box that says: I have personally reviewed the prescriber instructions on the second page...\"  If not, can we find the other pages?  "

## 2024-11-19 NOTE — TELEPHONE ENCOUNTER
Forms    Type of form/letter: OTHER: Medication    Do we have the form/letter: Yes: PA request form     Who is the form from? [facility] - PA department    Where did/will the form come from? form was faxed in    When is form/letter needed by: ASAP    How would you like the form/letter returned: Fax : [phone]    Patient Notified form requests are processed in 5-7 business days:No    Could we send this information to you in [employer] or would you prefer to receive a phone call?:   No preference     Okay to leave a detailed message?: N/A at Other phone number:  [phone]

## 2024-11-19 NOTE — TELEPHONE ENCOUNTER
Spoke with the pt and gave her the update on the clinic reaching out to gather more information about the form and that a new form needs to be filled out. Pt expressed understanding and will wait until the form has been completed.     Yu Sparks   [facility]

## 2024-11-19 NOTE — TELEPHONE ENCOUNTER
Reached out to the [facility] and asked if they knew any information about an additional page for this form; they stated they were not aware of any forms as they did not send them.     Following that, the PA department for - Advance member notice of non-covered prescriptions at [phone] was contacted and was informed that we were missing the second page. They confirmed that they sent the form but there was not a second form that was attached to this form we have received.     They will send over a single form that the provider will need to fill out. Once form has been received, we will place it in the provider basket for review.     Yu Sparks   [facility]

## 2024-11-19 NOTE — TELEPHONE ENCOUNTER
Form was faxed back to the location and number listed below.     Yu Sparks   [facility]

## 2024-11-19 NOTE — TELEPHONE ENCOUNTER
Received a call from patient. She is calling back regarding the Adderall. She states she is awaiting a form that allows her to pay out-of-pocket for the Adderall while awaiting the PA.    Routing to clinic team. Patient would like someone to follow-up with her regarding this form and when she will be able to pay out-of-pocket at her pharmacy.     Thanks!  Cari JIN RN, BSN  Clinic RN  [facility]

## 2024-11-20 NOTE — TELEPHONE ENCOUNTER
Flex from  Pharmacy called, requesting [place] fax number to send over fax and have provider fill out form.  Fax number provided.    Ghislaine Villagomez RN, BSN  [facility] [place]

## 2024-11-21 DIAGNOSIS — F98.8 ATTENTION DEFICIT DISORDER (ADD) WITHOUT HYPERACTIVITY: ICD-10-CM

## 2024-11-21 RX ORDER — DEXTROAMPHETAMINE SACCHARATE, AMPHETAMINE ASPARTATE, DEXTROAMPHETAMINE SULFATE AND AMPHETAMINE SULFATE 5; 5; 5; 5 MG/1; MG/1; MG/1; MG/1
20 TABLET ORAL 2 TIMES DAILY
Qty: 30 TABLET | Refills: 0 | Status: SHIPPED | OUTPATIENT
Start: 2024-11-21 | End: 2024-11-21

## 2024-11-21 RX ORDER — DEXTROAMPHETAMINE SACCHARATE, AMPHETAMINE ASPARTATE, DEXTROAMPHETAMINE SULFATE AND AMPHETAMINE SULFATE 5; 5; 5; 5 MG/1; MG/1; MG/1; MG/1
20 TABLET ORAL 2 TIMES DAILY
Qty: 60 TABLET | Refills: 0 | Status: SHIPPED | OUTPATIENT
Start: 2024-11-21

## 2024-11-21 NOTE — TELEPHONE ENCOUNTER
Perfect! According to Sophia, if you use the F90.8, she would need a new prescription for the Adderall 20mg tablets to take one tablet twice daily.    T'd up pharmacy and medication. Please see order and edit as necessary.    Abi Schroeder, HARPREETN, RN     [facility]    11/21/2024 at 2:34 PM

## 2024-11-21 NOTE — TELEPHONE ENCOUNTER
Called pt and left detailed message of update below per pt request. Told pt to call back if she has further questions.     HARPREET RussoN, RN     [facility]    11/21/2024 at 2:52 PM

## 2024-11-21 NOTE — TELEPHONE ENCOUNTER
"Pt called wanting update on form. Writer stated that the last update was that the form was sent back to pharmacy x2 and as off 11/20 pharmacy wanting [place] fax number to resend form. Told pt I would call pharmacy and see what I can do.    Called pharmacy and spoke with Sophia. She stated that pt is on PMAP plan which, from her knowledge, means that pt would not have to fill out extra form to pay our of pocket. Pharmacist stating that the reason the form has not been successful is because of the ICD-10 code. States that the F98.8 (attention deficit disorder (ADD) without hyperactivity) is for children/adolescents and cannot be diagnosed for a 64 year old pt. States that this is the reason that the form cannot be processed.    Pharmacist looked through ICD-10 diagnoses to find an ADD diagnoses for adults. Sophia found that the diagnoses F90.8 could work and is covered by insurance (pt will not need extra form or PA).  Sophia states that this diagnoses states \"Attention Deficit Hyperactivity Disorder - other types (adult residual, etc)\". Sophia states that \"adult residual\" and \"other types\" could mean ADD for pt.     Sophia states that she will double check that pt is on a PMAP plan and that this does not need an extra form. Will await her response.     Routing to Sacha Larios and left note at desk. Please let us know if F90.8 could be pt's diagnoses or if you are wanting to wait for Sophia's information.       Abi Schroeder, HARPREETN, RN     [facility]    11/21/2024 at 1:43 PM    "

## 2024-11-21 NOTE — TELEPHONE ENCOUNTER
Called pt and LM relaying that prescription was sent in      HARPREET RussoN, RN     [facility]    11/21/2024 at 4:05 PM

## 2024-12-18 DIAGNOSIS — F98.8 ATTENTION DEFICIT DISORDER (ADD) WITHOUT HYPERACTIVITY: ICD-10-CM

## 2024-12-18 RX ORDER — DEXTROAMPHETAMINE SACCHARATE, AMPHETAMINE ASPARTATE, DEXTROAMPHETAMINE SULFATE AND AMPHETAMINE SULFATE 5; 5; 5; 5 MG/1; MG/1; MG/1; MG/1
20 TABLET ORAL 2 TIMES DAILY
Qty: 60 TABLET | Refills: 0 | Status: SHIPPED | OUTPATIENT
Start: 2024-12-20

## 2024-12-26 ENCOUNTER — PATIENT OUTREACH (OUTPATIENT)
Dept: CARE COORDINATION | Facility: CLINIC | Age: 64
End: 2024-12-26
Payer: COMMERCIAL

## 2025-01-17 DIAGNOSIS — F98.8 ATTENTION DEFICIT DISORDER (ADD) WITHOUT HYPERACTIVITY: ICD-10-CM

## 2025-01-20 RX ORDER — DEXTROAMPHETAMINE SACCHARATE, AMPHETAMINE ASPARTATE, DEXTROAMPHETAMINE SULFATE AND AMPHETAMINE SULFATE 5; 5; 5; 5 MG/1; MG/1; MG/1; MG/1
20 TABLET ORAL 2 TIMES DAILY
Qty: 60 TABLET | Refills: 0 | Status: SHIPPED | OUTPATIENT
Start: 2025-01-20

## 2025-01-31 DIAGNOSIS — J45.20 MILD INTERMITTENT ASTHMA WITHOUT COMPLICATION: ICD-10-CM

## 2025-02-02 RX ORDER — ALBUTEROL SULFATE 90 UG/1
2 INHALANT RESPIRATORY (INHALATION) EVERY 6 HOURS PRN
Qty: 8.5 G | Refills: 1 | Status: SHIPPED | OUTPATIENT
Start: 2025-02-02

## 2025-02-19 ENCOUNTER — PATIENT OUTREACH (OUTPATIENT)
Dept: CARE COORDINATION | Facility: CLINIC | Age: 65
End: 2025-02-19

## 2025-02-19 ENCOUNTER — OFFICE VISIT (OUTPATIENT)
Dept: URGENT CARE | Facility: URGENT CARE | Age: 65
End: 2025-02-19

## 2025-02-19 VITALS
HEART RATE: 90 BPM | RESPIRATION RATE: 16 BRPM | WEIGHT: 189 LBS | OXYGEN SATURATION: 100 % | DIASTOLIC BLOOD PRESSURE: 83 MMHG | TEMPERATURE: 97.6 F | BODY MASS INDEX: 29.16 KG/M2 | SYSTOLIC BLOOD PRESSURE: 114 MMHG

## 2025-02-19 DIAGNOSIS — H00.015 HORDEOLUM EXTERNUM OF LEFT LOWER EYELID: ICD-10-CM

## 2025-02-19 DIAGNOSIS — F98.8 ATTENTION DEFICIT DISORDER (ADD) WITHOUT HYPERACTIVITY: ICD-10-CM

## 2025-02-19 DIAGNOSIS — H02.845 SWELLING OF LEFT LOWER EYELID: Primary | ICD-10-CM

## 2025-02-19 PROCEDURE — 99214 OFFICE O/P EST MOD 30 MIN: CPT | Performed by: PHYSICIAN ASSISTANT

## 2025-02-19 RX ORDER — DEXTROAMPHETAMINE SACCHARATE, AMPHETAMINE ASPARTATE, DEXTROAMPHETAMINE SULFATE AND AMPHETAMINE SULFATE 5; 5; 5; 5 MG/1; MG/1; MG/1; MG/1
20 TABLET ORAL 2 TIMES DAILY
Qty: 60 TABLET | Refills: 0 | Status: SHIPPED | OUTPATIENT
Start: 2025-02-19

## 2025-02-19 RX ORDER — ERYTHROMYCIN 5 MG/G
0.5 OINTMENT OPHTHALMIC 2 TIMES DAILY
Qty: 3.5 G | Refills: 0 | Status: SHIPPED | OUTPATIENT
Start: 2025-02-19

## 2025-02-19 NOTE — PROGRESS NOTES
Assessment & Plan     1. Swelling of left lower eyelid (Primary)  - amoxicillin-clavulanate (AUGMENTIN) 875-125 MG tablet; Take 1 tablet by mouth 2 times daily for 7 days.  Dispense: 14 tablet; Refill: 0  - erythromycin (ROMYCIN) 5 MG/GM ophthalmic ointment; Place 0.5 inches Into the left eye 2 times daily.  Dispense: 3.5 g; Refill: 0    2. Hordeolum externum of left lower eyelid    Patient presents to clinic for evaluation of left lower lid swelling.  This started 2 days ago and has been worsening.  An external hordeolum is noted, which is caused her lower lid to droop.  Additionally, in the past 24 hours she has had increasing eyelid swelling.  For this, I am going to treat with oral antibiotics to cover for an early preseptal/facial cellulitis.  I will have her use Romycin ointment in her eye twice daily.  She should also purchase Systane to use frequently, as her lid is drooping and is going to cause her eye to become dry.  If she does not see improvement in the drooping over the next several days, advised her to follow-up with an optometrist/ophthalmologist for further evaluation of this.  Low threshold for follow-up if having increasing swelling, development of fever, chills, worsening symptoms, eye pain, oculomotor pain etc.  Patient verbalizes understanding. All questions were addressed and answered.     AZAEL Dickinson [facility]    CHIEF COMPLAINT:   Chief Complaint   Patient presents with    Urgent Care     Pt presents with redness and  swelling of the left lower eye lid onset Monday     Adam Rubio is a 64 year old female who presents to clinic today for evaluation of left eyelid swelling.  On Monday, 2 days ago, patient noticed some swelling of her left lip along with pain and redness.  She used warm compress, which somewhat improved the swelling, but has now seen more swelling of the lower lid and pain.  No fever or chills.  No change in vision.  No  Spoke to mother.  Speaks polish primarily. Dr YAS bahena.  13 mo was at party w/others.  8 kids sick there.  Pt not eating and vomitted ? 15x so she brought him to ER.  Negative eval. Pt was not given ivf.     They gave zofran for home use  States he is also teething. Gums swollen    Does not want ot eat . Only pretzels? Palechki; and water. Will not drink Pedialyte.  Pt is voiding. No fever,no cold sx.     Could you please call mom for advise    thanks   foreign body sensation.  No trauma to her eye.      Past Medical History:   Diagnosis Date    Allergic rhinitis, cause unspecified     Anxiety state, unspecified     Atopic dermatitis 10/10/2011    Cardiac dysrhythmia, unspecified     Chronic rhinitis 10/10/2011    Colitis     Exhaustion due to excessive exertion(994.5)     Gallstones     Hypercholesterolemia     Insomnia, unspecified     Major depressive disorder, single episode, unspecified     Migraines 2012    Osteoarthritis of CMC joint of thumb     Other acne     Other malaise and fatigue     Palpitations     PONV (postoperative nausea and vomiting)     Uncomplicated asthma     Occasional     Past Surgical History:   Procedure Laterality Date    BIOPSY      breast lumps     SECTION      COLONOSCOPY      DAVINCI LAPAROSCOPIC CHOLECYSTECTOMY WITHOUT GRAMS  2013    Procedure: DAVINCI LAPAROSCOPIC CHOLECYSTECTOMY WITHOUT GRAMS;  DAVINCI CHOLECYSTECTOMY;  Surgeon: Jac Stallings MD;  Location: [facility]    ENT SURGERY      deviated septum, blocked nasal passage    GYN SURGERY      repair of fallopian tubes and ovaries    LUMPECTOMY BREAST      LUMPECTOMY BREAST      NOSE SURGERY       Social History     Tobacco Use    Smoking status: Every Day     Current packs/day: 0.50     Average packs/day: 0.5 packs/day for 15.0 years (7.5 ttl pk-yrs)     Types: Cigarettes    Smokeless tobacco: Former     Quit date: 10/1/2021    Tobacco comments:     has her chantix   Substance Use Topics    Alcohol use: No     Current Outpatient Medications   Medication Sig Dispense Refill    albuterol (PROAIR HFA/PROVENTIL HFA/VENTOLIN HFA) 108 (90 Base) MCG/ACT inhaler Inhale 2 puffs into the lungs every 6 hours as needed for shortness of breath or wheezing. 8.5 g 1    amoxicillin-clavulanate (AUGMENTIN) 875-125 MG tablet Take 1 tablet by mouth 2 times daily for 7 days. 14 tablet 0    amphetamine-dextroamphetamine (ADDERALL) 20 MG tablet TAKE ONE TABLET BY MOUTH TWICE  A DAY 60 tablet 0    erythromycin (ROMYCIN) 5 MG/GM ophthalmic ointment Place 0.5 inches Into the left eye 2 times daily. 3.5 g 0    aspirin 81 MG EC tablet Take 1 tablet (81 mg) by mouth daily (Patient not taking: Reported on 2/19/2025) 90 tablet 3    atorvastatin (LIPITOR) 20 MG tablet Take 1 tablet (20 mg) by mouth daily (Patient not taking: Reported on 2/19/2025) 90 tablet 3    traZODone (DESYREL) 50 MG tablet Take 1-2 tablets ( mg) by mouth at bedtime (Patient not taking: Reported on 2/19/2025) 90 tablet 0     No current facility-administered medications for this visit.     Allergies   Allergen Reactions    Codeine Nausea and Vomiting    Dust Mites     Latex     Morphine And Codeine Nausea       10 point ROS of systems were all negative except for pertinent positives noted in my HPI.      Exam:   /83   Pulse 90   Temp 97.6  F (36.4  C) (Tympanic)   Resp 16   Wt 85.7 kg (189 lb)   LMP  (LMP Unknown)   SpO2 100%   BMI 29.16 kg/m    Constitutional: healthy, alert and no distress  Eyes: L Lower lid has swelling and external hordeolum. Lower lid is drooping. She has swelling of the left lower lid.   EOMI. PERRL.  No fluorescein uptake noted.  No foreign body noted.  ENT: TMs clear and shiny airam, nasal mucosa pink and moist, throat without tonsillar hypertrophy or erythema  Neck: neck is supple, no cervical lymphadenopathy or nuchal rigidity  Cardiovascular: RRR  Respiratory: CTA bilaterally, no rhonchi or rales  Skin: no rashes      No results found for any visits on 02/19/25.

## 2025-02-19 NOTE — PROGRESS NOTES
Assessment & Plan     There are no diagnoses linked to this encounter.      No follow-ups on file.    Diagnosis and treatment plan was reviewed with patient and/or family.   We went over any labs or imaging. Discussed worsening symptoms or little to no relief despite treatment plan to follow-up with PCP or return to clinic.  Patient verbalizes understanding. All questions were addressed and answered.     Sunita Manuel PA-C  [facility]    CHIEF COMPLAINT:   Chief Complaint   Patient presents with    Urgent Care     Pt presents with redness and  swelling of the left lower eye lid onset Monday     Subjective     Joanne is a 64 year old female who presents to clinic today for evaluation ***      Past Medical History:   Diagnosis Date    Allergic rhinitis, cause unspecified     Anxiety state, unspecified     Atopic dermatitis 10/10/2011    Cardiac dysrhythmia, unspecified     Chronic rhinitis 10/10/2011    Colitis     Exhaustion due to excessive exertion(994.5)     Gallstones     Hypercholesterolemia     Insomnia, unspecified     Major depressive disorder, single episode, unspecified     Migraines 2012    Osteoarthritis of CMC joint of thumb     Other acne     Other malaise and fatigue     Palpitations     PONV (postoperative nausea and vomiting)     Uncomplicated asthma     Occasional     Past Surgical History:   Procedure Laterality Date    BIOPSY      breast lumps     SECTION      COLONOSCOPY      DAVINCI LAPAROSCOPIC CHOLECYSTECTOMY WITHOUT GRAMS  2013    Procedure: DAVINCI LAPAROSCOPIC CHOLECYSTECTOMY WITHOUT GRAMS;  DAVINCI CHOLECYSTECTOMY;  Surgeon: Jac Stallings MD;  Location: [facility]    ENT SURGERY      deviated septum, blocked nasal passage    GYN SURGERY      repair of fallopian tubes and ovaries    LUMPECTOMY BREAST      LUMPECTOMY BREAST      NOSE SURGERY       Social History     Tobacco Use    Smoking status: Every Day     Current packs/day: 0.50      Average packs/day: 0.5 packs/day for 15.0 years (7.5 ttl pk-yrs)     Types: Cigarettes    Smokeless tobacco: Former     Quit date: 10/1/2021    Tobacco comments:     has her chantix   Substance Use Topics    Alcohol use: No     Current Outpatient Medications   Medication Sig Dispense Refill    albuterol (PROAIR HFA/PROVENTIL HFA/VENTOLIN HFA) 108 (90 Base) MCG/ACT inhaler Inhale 2 puffs into the lungs every 6 hours as needed for shortness of breath or wheezing. 8.5 g 1    amphetamine-dextroamphetamine (ADDERALL) 20 MG tablet TAKE ONE TABLET BY MOUTH TWICE A DAY 60 tablet 0    aspirin 81 MG EC tablet Take 1 tablet (81 mg) by mouth daily (Patient not taking: Reported on 2/19/2025) 90 tablet 3    atorvastatin (LIPITOR) 20 MG tablet Take 1 tablet (20 mg) by mouth daily (Patient not taking: Reported on 2/19/2025) 90 tablet 3    traZODone (DESYREL) 50 MG tablet Take 1-2 tablets ( mg) by mouth at bedtime (Patient not taking: Reported on 2/19/2025) 90 tablet 0     No current facility-administered medications for this visit.     Allergies   Allergen Reactions    Codeine Nausea and Vomiting    Dust Mites     Latex     Morphine And Codeine Nausea       10 point ROS of systems were all negative except for pertinent positives noted in my HPI.      Exam: ***  /83   Pulse 90   Temp 97.6  F (36.4  C) (Tympanic)   Resp 16   Wt 85.7 kg (189 lb)   LMP  (LMP Unknown)   SpO2 100%   BMI 29.16 kg/m    Constitutional: healthy, alert and no distress  Head: Normocephalic, atraumatic.  Eyes: conjunctiva clear, no drainage  ENT: TMs clear and shiny airam, nasal mucosa pink and moist, throat without tonsillar hypertrophy or erythema  Neck: neck is supple, no cervical lymphadenopathy or nuchal rigidity  Cardiovascular: RRR  Respiratory: CTA bilaterally, no rhonchi or rales  Gastrointestinal: soft and nontender  Skin: no rashes  Neurologic: Speech clear, gait normal. Moves all extremities.    No results found for any visits on  02/19/25.

## 2025-02-19 NOTE — PATIENT INSTRUCTIONS
Start taking your antibiotics as prescribed two times per day for one week  Use the ophthalmic two times per day  Use a lubricating drop like systane every couple hours in your eye     IF you have worsening swelling, fever, chills, pain with moving your eye etc please go to the ER    If the lower lid continues to droop, please follow-up with an Eye doctor. I recommend [facility] or [facility].

## 2025-02-19 NOTE — LETTER
[facility] CARE COORDIN[facility]  [id number] [place] [address]    February 19, 2025    Joanne Padilla  [address]  [place] [facility] [address]      Dear Joanne,    I am a clinic community health worker who works with Fermín Larios PA-C with the [facility]. I wanted to thank you for spending the time to talk with me.  Below is a description of clinic care coordination and how I can further assist you.       The clinic care coordination team is made up of a registered nurse, , financial resource worker and community health worker who understand the health care system. The goal of clinic care coordination is to help you manage your health and improve access to the health care system. Our team works alongside your provider to assist you in determining your health and social needs. We can help you obtain health care and community resources, providing you with necessary information and education. We can work with you through any barriers and develop a care plan that helps coordinate and strengthen the communication between you and your care team.  Our services are voluntary and are offered without charge to you personally.    Please feel free to contact me with any questions or concerns regarding care coordination and what we can offer.      We are focused on providing you with the highest-quality healthcare experience possible.    Sincerely,     Monisha Altman CHW, B.S. Public Health  Clinic Care Coordination  [facility]:  [place]  [phone]  [email or web address]

## 2025-02-19 NOTE — PROGRESS NOTES
Clinic Care Coordination Contact  Community Health Worker Initial Outreach    Referral initially from patients sons PCP who is the same as patients. CHW opened CC program in patients chart as the conversation was mainly around her and her needs and concerns.   Patient has been staying at a shelter through [facility], helped get there from a housing worker through a program that is for kids 24 and under? She took her son here to get help as he is 21 and autistic.   They have been staying in extended stay hotel, in car for some time, and hotel shelters for the last few years. Patient has not had any income and found it difficult to find any stable housing. She has been living off savings.   Now, patient receives social security survivor benefits $1500/month.   Was told that they make too much and was denied MA?   However, CHW looked in [place]-its pt and son have active MA through [employer]. Given [employer] # to request new insurance cards: [phone]. She will call the billing dept to update insurance info in both charts.   She receives $536/month in SNAP.   Pt states that she has been trying to get a CADI waiver for two years. She talked to the county about the waiver this morning, they said that for the waiver is for a household of 1 so she has a spend down of $300. Everything else they're listed as a household of 2. She was SMRT'd last August and was only told this in Jan.  They do have issues with getting mail when always in hotel shelters/ extended stays/ vehicles. Encouraged getting PO box.    Patient is on CDA waitlist. Son can't live on his own, has been paying for storage units.   CHW encouraged them to work with  at shelter as they are more of the experts in finding stable housing and can work with him 1:1 in person to fill out applications.   She feels confident with direction CC given her, has a plan going forward and will reach back out if any further roadblocks arise.     Patient accepts  CC: No, patient has no further needs for CC at this time. Patient will be sent Care Coordination introduction letter for future reference.     Monisha Altman CHW, B.S. [facility] Care Coordination  [facility]:  [place]  [phone]  [email or web address]

## 2025-03-27 ENCOUNTER — OFFICE VISIT (OUTPATIENT)
Dept: URGENT CARE | Facility: URGENT CARE | Age: 65
End: 2025-03-27
Payer: COMMERCIAL

## 2025-03-27 VITALS
SYSTOLIC BLOOD PRESSURE: 118 MMHG | TEMPERATURE: 97.9 F | RESPIRATION RATE: 18 BRPM | WEIGHT: 184.4 LBS | OXYGEN SATURATION: 97 % | BODY MASS INDEX: 28.45 KG/M2 | DIASTOLIC BLOOD PRESSURE: 80 MMHG | HEART RATE: 93 BPM

## 2025-03-27 DIAGNOSIS — L03.012 PARONYCHIA OF LEFT MIDDLE FINGER: Primary | ICD-10-CM

## 2025-03-27 RX ORDER — CEPHALEXIN 500 MG/1
500 CAPSULE ORAL 3 TIMES DAILY
Qty: 30 CAPSULE | Refills: 0 | Status: SHIPPED | OUTPATIENT
Start: 2025-03-27 | End: 2025-04-06

## 2025-03-27 NOTE — PROGRESS NOTES
ASSESSMENT/  PLAN:  Paronychia of left middle finger     - cephALEXin (KEFLEX) 500 MG capsule; Take 1 capsule (500 mg) by mouth 3 times daily for 10 days.      patient was advised to perform warm water soaks twice daily to help resolve the infection.  She may develop purulent drainage in the coming days-  keep clean and bandaged if draining      ---------------------------------------------------------------------------------------------------------------------------------------------------------------------    SUBJECTIVE:  Chief Complaint   Patient presents with    Urgent Care     Patient presents with middle finger on left hand that is swelling, red, throbbing on end of finger x 4 days. Area feels hot to touch. No known injury and symptoms have gradually gotten worse each day     Joanne Padilla is a 64 year old female who presents complaining of left  hand  third digit pain.  She has noted some redness and swelling along the cuticle margin.  Symptoms began yesterday.   Severity: moderate.  She denies any trauma to the area.  No fevers or chills noted.  No migration of redness or swelling proximally.  She thinks she had some cracks in the cuticle that allowed infection around the fingernail    Past Medical History:   Diagnosis Date    Allergic rhinitis, cause unspecified     Anxiety state, unspecified     Atopic dermatitis 10/10/2011    Cardiac dysrhythmia, unspecified     Chronic rhinitis 10/10/2011    Colitis     Exhaustion due to excessive exertion(994.5)     Gallstones     Hypercholesterolemia     Insomnia, unspecified     Major depressive disorder, single episode, unspecified     Migraines 12/17/2012    Osteoarthritis of CMC joint of thumb     Other acne     Other malaise and fatigue     Palpitations     PONV (postoperative nausea and vomiting)     Uncomplicated asthma     Occasional     Patient Active Problem List   Diagnosis    Chronic rhinitis    Atopic dermatitis    Migraines    Allergic rhinitis     Exhaustion due to excessive exertion(994.5)    Atherosclerosis of abdominal aorta    History of traumatic brain injury    Attention deficit disorder (ADD) without hyperactivity    Primary insomnia    Tobacco use       ALLERGIES:  Codeine, Dust mites, Latex, and Morphine and codeine    Current Outpatient Medications   Medication Sig Dispense Refill    albuterol (PROAIR HFA/PROVENTIL HFA/VENTOLIN HFA) 108 (90 Base) MCG/ACT inhaler Inhale 2 puffs into the lungs every 6 hours as needed for shortness of breath or wheezing. 8.5 g 1    amphetamine-dextroamphetamine (ADDERALL) 20 MG tablet TAKE ONE TABLET BY MOUTH TWICE A DAY 60 tablet 0    aspirin 81 MG EC tablet Take 1 tablet (81 mg) by mouth daily 90 tablet 3    cephALEXin (KEFLEX) 500 MG capsule Take 1 capsule (500 mg) by mouth 3 times daily for 10 days. 30 capsule 0    atorvastatin (LIPITOR) 20 MG tablet Take 1 tablet (20 mg) by mouth daily (Patient not taking: Reported on 3/27/2025) 90 tablet 3    erythromycin (ROMYCIN) 5 MG/GM ophthalmic ointment Place 0.5 inches Into the left eye 2 times daily. 3.5 g 0    traZODone (DESYREL) 50 MG tablet Take 1-2 tablets ( mg) by mouth at bedtime (Patient not taking: Reported on 3/27/2025) 90 tablet 0     No current facility-administered medications for this visit.       Social History     Tobacco Use    Smoking status: Every Day     Current packs/day: 0.50     Average packs/day: 0.5 packs/day for 15.0 years (7.5 ttl pk-yrs)     Types: Cigarettes    Smokeless tobacco: Former     Quit date: 10/1/2021    Tobacco comments:     has her chantix   Substance Use Topics    Alcohol use: No       Family History   Problem Relation Age of Onset    C.A.D. Mother         MI early 50s;  59 heart problems    Breast Cancer Mother         Cancer Unknown Primary    Other Cancer Mother     Anesthesia Reaction Mother     C.A.D. Father     Diabetes Father     Breast Cancer Maternal Grandmother         Cancer Unknown Primary    Anxiety Disorder  Sister     Thyroid Disease Paternal Grandmother        ROS:  CONSTITUTIONAL:NEGATIVE for fever, chills,    EYES: NEGATIVE for vision changes or irritation  ENT/MOUTH: NEGATIVE for ear, mouth and throat problems  RESP:NEGATIVE for significant cough or SOB    OBJECTIVE:  /80   Pulse 93   Temp 97.9  F (36.6  C) (Tympanic)   Resp 18   Wt 83.6 kg (184 lb 6.4 oz)   LMP  (LMP Unknown)   SpO2 97%   BMI 28.45 kg/m    left Hand/ exam:  examination of third digit reveals paronychia with redness, tenderness and swelling along the distal digit at the nail margin.      GENERAL APPEARANCE: healthy, alert and no distress  MS:extremities normal- no gross deformities noted,  FROM noted in all extremities  NEURO: Normal strength and tone, sensory exam grossly normal,  normal speech and mentation  SKIN: no suspicious lesions or rashes

## 2025-03-31 ENCOUNTER — OFFICE VISIT (OUTPATIENT)
Dept: URGENT CARE | Facility: URGENT CARE | Age: 65
End: 2025-03-31
Payer: COMMERCIAL

## 2025-03-31 VITALS
OXYGEN SATURATION: 98 % | TEMPERATURE: 97.6 F | SYSTOLIC BLOOD PRESSURE: 108 MMHG | HEART RATE: 84 BPM | WEIGHT: 184 LBS | BODY MASS INDEX: 28.39 KG/M2 | RESPIRATION RATE: 18 BRPM | DIASTOLIC BLOOD PRESSURE: 76 MMHG

## 2025-03-31 DIAGNOSIS — L03.012 PARONYCHIA OF FINGER OF LEFT HAND: Primary | ICD-10-CM

## 2025-03-31 PROCEDURE — 87070 CULTURE OTHR SPECIMN AEROBIC: CPT | Performed by: NURSE PRACTITIONER

## 2025-03-31 PROCEDURE — 3078F DIAST BP <80 MM HG: CPT | Performed by: NURSE PRACTITIONER

## 2025-03-31 PROCEDURE — 10060 I&D ABSCESS SIMPLE/SINGLE: CPT | Performed by: NURSE PRACTITIONER

## 2025-03-31 PROCEDURE — 87186 SC STD MICRODIL/AGAR DIL: CPT | Performed by: NURSE PRACTITIONER

## 2025-03-31 PROCEDURE — 99214 OFFICE O/P EST MOD 30 MIN: CPT | Mod: 25 | Performed by: NURSE PRACTITIONER

## 2025-03-31 PROCEDURE — 3074F SYST BP LT 130 MM HG: CPT | Performed by: NURSE PRACTITIONER

## 2025-03-31 PROCEDURE — 87077 CULTURE AEROBIC IDENTIFY: CPT | Mod: 59 | Performed by: NURSE PRACTITIONER

## 2025-03-31 NOTE — PATIENT INSTRUCTIONS
I&D done in clinic  Start epsom salt warm soapy water soaks  Stay on keflex  Culture pending, we will call if additional antibiotic is needed  Should see improvement in 2-3 days  Otherwise, reevaluation  ER if fevers rapid worsening vomiting cannot bend finger  numbness pallor cool sensation

## 2025-03-31 NOTE — PROGRESS NOTES
Chief Complaint   Patient presents with    Urgent Care     Seen about 4-5 days ago for her middle finger on left side, was put on antibiotics- Keflex on day 4 or 5 of it currently, more red now- still swollen, was starting to get red down the finger but that got better- hurts when she bumps it.      SUBJECTIVE:  Joanne Padilla is a 64 year old female who presents to the clinic today with L middle finger infection over the past week. Has been on keflex for 5 days with new white pustular cuticle fluctuance. Redness has improved at the proximal aspect. No fevers, vomiting, lost ROM. Started after cleaning floors. Not diabetic. No history of this.    Past Medical History:   Diagnosis Date    Allergic rhinitis, cause unspecified     Anxiety state, unspecified     Atopic dermatitis 10/10/2011    Cardiac dysrhythmia, unspecified     Chronic rhinitis 10/10/2011    Colitis     Exhaustion due to excessive exertion(994.5)     Gallstones     Hypercholesterolemia     Insomnia, unspecified     Major depressive disorder, single episode, unspecified     Migraines 12/17/2012    Osteoarthritis of CMC joint of thumb     Other acne     Other malaise and fatigue     Palpitations     PONV (postoperative nausea and vomiting)     Uncomplicated asthma     Occasional     Current Outpatient Medications   Medication Sig Dispense Refill    albuterol (PROAIR HFA/PROVENTIL HFA/VENTOLIN HFA) 108 (90 Base) MCG/ACT inhaler Inhale 2 puffs into the lungs every 6 hours as needed for shortness of breath or wheezing. 8.5 g 1    amphetamine-dextroamphetamine (ADDERALL) 20 MG tablet TAKE ONE TABLET BY MOUTH TWICE A DAY 60 tablet 0    aspirin 81 MG EC tablet Take 1 tablet (81 mg) by mouth daily 90 tablet 3    cephALEXin (KEFLEX) 500 MG capsule Take 1 capsule (500 mg) by mouth 3 times daily for 10 days. 30 capsule 0    erythromycin (ROMYCIN) 5 MG/GM ophthalmic ointment Place 0.5 inches Into the left eye 2 times daily. 3.5 g 0    atorvastatin (LIPITOR) 20  MG tablet Take 1 tablet (20 mg) by mouth daily (Patient not taking: Reported on 3/27/2025) 90 tablet 3    traZODone (DESYREL) 50 MG tablet Take 1-2 tablets ( mg) by mouth at bedtime (Patient not taking: Reported on 3/27/2025) 90 tablet 0     No current facility-administered medications for this visit.     Social History     Tobacco Use    Smoking status: Every Day     Current packs/day: 0.50     Average packs/day: 0.5 packs/day for 15.0 years (7.5 ttl pk-yrs)     Types: Cigarettes    Smokeless tobacco: Former     Quit date: 10/1/2021    Tobacco comments:     has her chantix   Substance Use Topics    Alcohol use: No     Allergies   Allergen Reactions    Codeine Nausea and Vomiting    Dust Mites     Latex     Morphine And Codeine Nausea     Review of Systems  ROS: 10 point ROS neg other than the symptoms noted above in the HPI.    EXAM:   /76   Pulse 84   Temp 97.6  F (36.4  C)   Resp 18   Wt 83.5 kg (184 lb)   LMP  (LMP Unknown)   SpO2 98%   BMI 28.39 kg/m      Physical Exam  Vitals reviewed.   Constitutional:       General: She is not in acute distress.     Appearance: Normal appearance. She is not ill-appearing, toxic-appearing or diaphoretic.   HENT:      Head: Normocephalic and atraumatic.   Cardiovascular:      Rate and Rhythm: Normal rate.      Pulses: Normal pulses.   Pulmonary:      Effort: Pulmonary effort is normal.   Musculoskeletal:         General: Swelling and tenderness present. Normal range of motion.   Skin:     General: Skin is warm and dry.      Coloration: Skin is not pale.      Findings: Erythema and lesion present. No rash.      Comments: L middle finger paroncyhia with moderate erythema fluctuant tender edema to DIP and white purulence at cuticle   Neurological:      General: No focal deficit present.      Mental Status: She is alert and oriented to person, place, and time.      Sensory: No sensory deficit.   Psychiatric:         Mood and Affect: Mood normal.          Behavior: Behavior normal.       ASSESSMENT:    ICD-10-CM    1. Paronychia of finger of left hand  L03.012 Swab Aerobic Bacterial Culture Routine Without Gram Stain     DRAIN SKIN ABSCESS SIMPLE/SINGLE        PLAN:    Procedure area cleansed with Betadine and alcohol prep pads. Ethyl chloride used for good anesthesia.  An 11 blade was used to incise and lift the cuticle at the area of greatest fluctuance.  White purulent discharge removed and cultured.  Area cleansed and bandaged.  Patient tolerated well.    I&D done in clinic  Start epsom salt warm soapy water soaks  Stay on keflex  Culture pending, we will call if additional antibiotic is needed  Should see improvement in 2-3 days  Otherwise, reevaluation  ER if fevers rapid worsening vomiting cannot bend finger  numbness pallor cool sensation    Follow up with primary care provider with any problems, questions or concerns or if symptoms worsen or fail to improve. Patient agreed to plan and verbalized understanding.    Sheela Carreon, KAROLP-BC  [facility]

## 2025-04-02 LAB
BACTERIA WND CULT: ABNORMAL
BACTERIA WND CULT: ABNORMAL

## 2025-04-03 ENCOUNTER — OFFICE VISIT (OUTPATIENT)
Dept: URGENT CARE | Facility: URGENT CARE | Age: 65
End: 2025-04-03
Payer: COMMERCIAL

## 2025-04-03 VITALS
HEART RATE: 89 BPM | DIASTOLIC BLOOD PRESSURE: 68 MMHG | BODY MASS INDEX: 27.89 KG/M2 | TEMPERATURE: 96.9 F | HEIGHT: 68 IN | SYSTOLIC BLOOD PRESSURE: 102 MMHG | RESPIRATION RATE: 16 BRPM | WEIGHT: 184 LBS | OXYGEN SATURATION: 97 %

## 2025-04-03 DIAGNOSIS — B37.31 YEAST INFECTION OF THE VAGINA: ICD-10-CM

## 2025-04-03 DIAGNOSIS — N89.8 VAGINAL ITCHING: Primary | ICD-10-CM

## 2025-04-03 DIAGNOSIS — L03.012 PARONYCHIA OF FINGER OF LEFT HAND: ICD-10-CM

## 2025-04-03 LAB
CLUE CELLS: ABNORMAL
TRICHOMONAS, WET PREP: ABNORMAL
WBC'S/HIGH POWER FIELD, WET PREP: ABNORMAL
YEAST, WET PREP: PRESENT

## 2025-04-03 RX ORDER — FLUCONAZOLE 150 MG/1
150 TABLET ORAL
Qty: 4 TABLET | Refills: 0 | Status: SHIPPED | OUTPATIENT
Start: 2025-04-03 | End: 2025-04-13

## 2025-04-03 RX ORDER — SULFAMETHOXAZOLE AND TRIMETHOPRIM 800; 160 MG/1; MG/1
1 TABLET ORAL 2 TIMES DAILY
Qty: 10 TABLET | Refills: 0 | Status: SHIPPED | OUTPATIENT
Start: 2025-04-03 | End: 2025-04-08

## 2025-04-03 NOTE — PATIENT INSTRUCTIONS
Results for orders placed or performed in visit on 04/03/25   Wet prep - Clinic Collect     Status: Abnormal    Specimen: Vagina; Swab   Result Value Ref Range    Trichomonas Absent Absent    Yeast Present (A) Absent    Clue Cells Absent Absent    WBCs/high power field 3+ (A) None     Yeast positive  Start diflucan x 4 dose.     Stop keflex, start bactrim twice a day for 5 days.  .

## 2025-04-03 NOTE — PROGRESS NOTES
Assessment & Plan     Vaginal itching  - Wet prep - Clinic Collect    Paronychia of finger of left hand  - sulfamethoxazole-trimethoprim (BACTRIM DS) 800-160 MG tablet  Dispense: 10 tablet; Refill: 0    Yeast infection of the vagina  - fluconazole (DIFLUCAN) 150 MG tablet  Dispense: 4 tablet; Refill: 0     Patient Instructions     Results for orders placed or performed in visit on 04/03/25   Wet prep - Clinic Collect     Status: Abnormal    Specimen: Vagina; Swab   Result Value Ref Range    Trichomonas Absent Absent    Yeast Present (A) Absent    Clue Cells Absent Absent    WBCs/high power field 3+ (A) None     Yeast positive  Start diflucan x 4 dose.     Stop keflex, start bactrim twice a day for 5 days.  .      Return in about 1 week (around 4/10/2025) for with regular provider if symptoms persist.    [facility]ALD Choudhury [facility] is a 64 year old female who presents to clinic today for the following health issues:  Chief Complaint   Patient presents with    Finger     Patient presents with ongoing finger infection from 3-27-25 and 3-31-25 urgent care visit.  Finger is still really sore and not improving.  She noticed the results show a bad bacteria and is wondering if antibiotic is not working.    Vaginal Problem     Vaginal itching x 3 days.  She is currently on antibiotic.         4/3/2025     2:08 PM   Additional Questions   Roomed by Nithya     HPI      Rash    Onset of rash was 2 week(s) ago.   Course of illness is worsening.  Severity moderate  Current and Associated symptoms: painful, discharge, red, and blistering   Location of the rash: finger.  Previous history of a similar rash? No  Recent exposure history: none known  Denies exposure to: none known  Associated symptoms include: nothing.  Treatment measures tried include: keflex which was not on the susceptibilities testing    GYN Complaint    Onset of symptoms was 2 day(s) ago.  Course of  "illness is worsening.    Severity moderate  Current and Associated symptoms: vulvar itching  Treatment measures tried include:  None  Sexually active: no  Predisposing factors: antibiotics  Hx of previous symptom: rare    Review of Systems  Constitutional, HEENT, cardiovascular, pulmonary, GI, , musculoskeletal, neuro, skin, endocrine and psych systems are negative, except as otherwise noted.      Objective    /68   Pulse 89   Temp 96.9  F (36.1  C) (Tympanic)   Resp 16   Ht 1.715 m (5' 7.5\")   Wt 83.5 kg (184 lb)   LMP  (LMP Unknown)   SpO2 97%   BMI 28.39 kg/m    Physical Exam   GENERAL: alert and no distress  RESP: lungs clear to auscultation - no rales, rhonchi or wheezes  CV: regular rate and rhythm, normal S1 S2, no S3 or S4, no murmur, click or rub, no peripheral edema  MS: no gross musculoskeletal defects noted, no edema  SKIN: erythema with edema and purulent discharge to left middle fingertip.          "

## 2025-04-19 DIAGNOSIS — F98.8 ATTENTION DEFICIT DISORDER (ADD) WITHOUT HYPERACTIVITY: ICD-10-CM

## 2025-04-20 RX ORDER — DEXTROAMPHETAMINE SACCHARATE, AMPHETAMINE ASPARTATE, DEXTROAMPHETAMINE SULFATE AND AMPHETAMINE SULFATE 5; 5; 5; 5 MG/1; MG/1; MG/1; MG/1
20 TABLET ORAL 2 TIMES DAILY
Qty: 60 TABLET | Refills: 0 | Status: SHIPPED | OUTPATIENT
Start: 2025-04-20

## 2025-04-20 RX ORDER — DEXTROAMPHETAMINE SACCHARATE, AMPHETAMINE ASPARTATE, DEXTROAMPHETAMINE SULFATE AND AMPHETAMINE SULFATE 2.5; 2.5; 2.5; 2.5 MG/1; MG/1; MG/1; MG/1
20 TABLET ORAL 2 TIMES DAILY
Qty: 120 TABLET | Refills: 0 | OUTPATIENT
Start: 2025-04-20

## 2025-04-20 NOTE — TELEPHONE ENCOUNTER
Joanne is due for a 6 month med check. I will fill one time but please help her schedule in person

## 2025-05-15 ENCOUNTER — OFFICE VISIT (OUTPATIENT)
Dept: URGENT CARE | Facility: URGENT CARE | Age: 65
End: 2025-05-15
Payer: MEDICAID

## 2025-05-15 VITALS
HEART RATE: 70 BPM | DIASTOLIC BLOOD PRESSURE: 83 MMHG | OXYGEN SATURATION: 99 % | WEIGHT: 166.1 LBS | TEMPERATURE: 96.8 F | BODY MASS INDEX: 25.17 KG/M2 | SYSTOLIC BLOOD PRESSURE: 124 MMHG | HEIGHT: 68 IN | RESPIRATION RATE: 16 BRPM

## 2025-05-15 DIAGNOSIS — G44.209 TENSION HEADACHE: Primary | ICD-10-CM

## 2025-05-15 DIAGNOSIS — M62.838 MUSCLE SPASM: ICD-10-CM

## 2025-05-15 RX ORDER — CYCLOBENZAPRINE HCL 5 MG
5-10 TABLET ORAL 3 TIMES DAILY PRN
Qty: 20 TABLET | Refills: 0 | Status: SHIPPED | OUTPATIENT
Start: 2025-05-15

## 2025-05-15 RX ORDER — KETOROLAC TROMETHAMINE 30 MG/ML
60 INJECTION, SOLUTION INTRAMUSCULAR; INTRAVENOUS ONCE
Status: COMPLETED | OUTPATIENT
Start: 2025-05-15 | End: 2025-05-15

## 2025-05-15 RX ADMIN — KETOROLAC TROMETHAMINE 60 MG: 30 INJECTION, SOLUTION INTRAMUSCULAR; INTRAVENOUS at 17:10

## 2025-05-15 NOTE — PROGRESS NOTES
SUBJECTIVE:  Joanne Padilla is a 64 year old female with concerns for headache for the past 4 days.  Patient does have a history of migraines.  States that this headache is coming from her neck as she has tight muscles on the right side that is radiating to the back of her head and up behind her eye.  She has taken some over-the-counter medications with minimal relief.  States that the muscles feel tight.  Denies neurological symptoms.  Vision is normal.  She has no focal weakness.  She otherwise at baseline health.      Past Medical History:   Diagnosis Date    Allergic rhinitis, cause unspecified     Anxiety state, unspecified     Atopic dermatitis 10/10/2011    Cardiac dysrhythmia, unspecified     Chronic rhinitis 10/10/2011    Colitis     Exhaustion due to excessive exertion(994.5)     Gallstones     Hypercholesterolemia     Insomnia, unspecified     Major depressive disorder, single episode, unspecified     Migraines 12/17/2012    Osteoarthritis of CMC joint of thumb     Other acne     Other malaise and fatigue     Palpitations     PONV (postoperative nausea and vomiting)     Uncomplicated asthma     Occasional     Patient Active Problem List   Diagnosis    Chronic rhinitis    Atopic dermatitis    Migraines    Allergic rhinitis    Exhaustion due to excessive exertion(994.5)    Atherosclerosis of abdominal aorta    History of traumatic brain injury    Attention deficit disorder (ADD) without hyperactivity    Primary insomnia    Tobacco use     Current Outpatient Medications   Medication Sig Dispense Refill    albuterol (PROAIR HFA/PROVENTIL HFA/VENTOLIN HFA) 108 (90 Base) MCG/ACT inhaler Inhale 2 puffs into the lungs every 6 hours as needed for shortness of breath or wheezing. 8.5 g 1    amphetamine-dextroamphetamine (ADDERALL) 20 MG tablet Take 1 tablet (20 mg) by mouth 2 times daily. 60 tablet 0    aspirin 81 MG EC tablet Take 1 tablet (81 mg) by mouth daily (Patient not taking: Reported on 5/15/2025) 90  tablet 3    atorvastatin (LIPITOR) 20 MG tablet Take 1 tablet (20 mg) by mouth daily (Patient not taking: Reported on 5/15/2025) 90 tablet 3    erythromycin (ROMYCIN) 5 MG/GM ophthalmic ointment Place 0.5 inches Into the left eye 2 times daily. (Patient not taking: Reported on 5/15/2025) 3.5 g 0    traZODone (DESYREL) 50 MG tablet Take 1-2 tablets ( mg) by mouth at bedtime (Patient not taking: Reported on 5/15/2025) 90 tablet 0     No current facility-administered medications for this visit.     Social History     Socioeconomic History    Marital status:      Spouse name: Not on file    Number of children: Not on file    Years of education: Not on file    Highest education level: Not on file   Occupational History    Not on file   Tobacco Use    Smoking status: Every Day     Current packs/day: 0.50     Average packs/day: 0.5 packs/day for 15.0 years (7.5 ttl pk-yrs)     Types: Cigarettes    Smokeless tobacco: Former     Quit date: 10/1/2021    Tobacco comments:     has her chantix   Vaping Use    Vaping status: Never Used   Substance and Sexual Activity    Alcohol use: No    Drug use: No    Sexual activity: Not Currently     Partners: Male     Birth control/protection: Pill, None   Other Topics Concern    Parent/sibling w/ CABG, MI or angioplasty before 65F 55M? No   Social History Narrative    Not on file     Social Drivers of Health     Financial Resource Strain: Low Risk  (11/7/2023)    Financial Resource Strain     Within the past 12 months, have you or your family members you live with been unable to get utilities (heat, electricity) when it was really needed?: No   Food Insecurity: High Risk (11/7/2023)    Food Insecurity     Within the past 12 months, did you worry that your food would run out before you got money to buy more?: Yes     Within the past 12 months, did the food you bought just not last and you didn t have money to get more?: Yes   Transportation Needs: Low Risk  (11/7/2023)     Transportation Needs     Within the past 12 months, has lack of transportation kept you from medical appointments, getting your medicines, non-medical meetings or appointments, work, or from getting things that you need?: No   Physical Activity: Not on file   Stress: Not on file   Social Connections: Not on file   Interpersonal Safety: Low Risk  (11/7/2023)    Interpersonal Safety     Do you feel physically and emotionally safe where you currently live?: Yes     Within the past 12 months, have you been hit, slapped, kicked or otherwise physically hurt by someone?: No     Within the past 12 months, have you been humiliated or emotionally abused in other ways by your partner or ex-partner?: No   Housing Stability: High Risk (11/7/2023)    Housing Stability     Do you have housing? : No     Are you worried about losing your housing?: Yes     ROS  negative other than stated above    Exam:  GENERAL APPEARANCE: healthy, alert and no distress  EYES: EOMI,  PERRL  HENT: ear canals and TM's normal and nose and mouth without ulcers or lesions  NECK: Tenderness right side of her neck with muscle spasms noted on the right.  RESP: lungs clear to auscultation - no rales, rhonchi or wheezes  CV: regular rates and rhythm, normal S1 S2, no S3 or S4 and no murmur, click or rub -  MS: Tight and tender trapezius on the right side that radiates up the right side of her neck and into her scalp.  SKIN: no suspicious lesions or rashes  NEURO: Normal strength and tone, sensory exam grossly normal, mentation intact and speech normal    assessment/plan:  (G44.209) Tension headache  (primary encounter diagnosis)  Comment:   Plan: ketorolac (TORADOL) injection 60 mg        Patient with right-sided muscle spasms along the trapezius that extend to the neck causing a headache.  There is no neurological symptoms.  Injection of Toradol was given in the clinic for comfort measure.  Ice along with gentle stretching.  May consider acupuncture.  Muscle  spasms present and Flexeril was given.  May continue with over-the-counter meds as needed for pain with no Advil or anti-inflammatories until tomorrow.  Red flag signs were discussed return as needed.    (Q79.930) Muscle spasm  Comment:   Plan: cyclobenzaprine (FLEXERIL) 5 MG tablet

## 2025-05-15 NOTE — PROGRESS NOTES
Urgent Care Clinic Visit    Chief Complaint   Patient presents with    Headache     65 yo F presents with the following migraine sx's sensitivity to light, nausea, dizzyness, right side neck pain  onset Monday tx-  advil Pt prone to migraines.                 5/15/2025     4:39 PM   Additional Questions   Roomed by Azucena Lowery   Accompanied by self

## 2025-05-18 DIAGNOSIS — F98.8 ATTENTION DEFICIT DISORDER (ADD) WITHOUT HYPERACTIVITY: ICD-10-CM

## 2025-05-19 NOTE — TELEPHONE ENCOUNTER
I noticed that Joanne recently had a establish care visit with [facility] (early April) and has no showed/cancelled with me the two recent visits. I havent had an appt with her in nearly one year. Not sure who she is planning to see regularly but I would need a visit prior to sending these controlled medication refills in

## 2025-05-20 ENCOUNTER — OFFICE VISIT (OUTPATIENT)
Dept: FAMILY MEDICINE | Facility: CLINIC | Age: 65
End: 2025-05-20
Payer: MEDICAID

## 2025-05-20 ENCOUNTER — ORDERS ONLY (AUTO-RELEASED) (OUTPATIENT)
Dept: FAMILY MEDICINE | Facility: CLINIC | Age: 65
End: 2025-05-20

## 2025-05-20 VITALS
SYSTOLIC BLOOD PRESSURE: 112 MMHG | BODY MASS INDEX: 25.16 KG/M2 | TEMPERATURE: 97.8 F | WEIGHT: 166 LBS | DIASTOLIC BLOOD PRESSURE: 76 MMHG | HEIGHT: 68 IN | OXYGEN SATURATION: 99 % | RESPIRATION RATE: 16 BRPM | HEART RATE: 88 BPM

## 2025-05-20 DIAGNOSIS — Z12.31 ENCOUNTER FOR SCREENING MAMMOGRAM FOR BREAST CANCER: ICD-10-CM

## 2025-05-20 DIAGNOSIS — F51.01 PRIMARY INSOMNIA: ICD-10-CM

## 2025-05-20 DIAGNOSIS — I70.0 ATHEROSCLEROSIS OF ABDOMINAL AORTA: ICD-10-CM

## 2025-05-20 DIAGNOSIS — Z12.11 SCREENING FOR COLON CANCER: ICD-10-CM

## 2025-05-20 DIAGNOSIS — F98.8 ATTENTION DEFICIT DISORDER (ADD) WITHOUT HYPERACTIVITY: Primary | ICD-10-CM

## 2025-05-20 DIAGNOSIS — J45.20 MILD INTERMITTENT ASTHMA WITHOUT COMPLICATION: ICD-10-CM

## 2025-05-20 PROCEDURE — 3074F SYST BP LT 130 MM HG: CPT | Performed by: PHYSICIAN ASSISTANT

## 2025-05-20 PROCEDURE — 36415 COLL VENOUS BLD VENIPUNCTURE: CPT | Performed by: PHYSICIAN ASSISTANT

## 2025-05-20 PROCEDURE — 99214 OFFICE O/P EST MOD 30 MIN: CPT | Performed by: PHYSICIAN ASSISTANT

## 2025-05-20 PROCEDURE — 80053 COMPREHEN METABOLIC PANEL: CPT | Performed by: PHYSICIAN ASSISTANT

## 2025-05-20 PROCEDURE — 1126F AMNT PAIN NOTED NONE PRSNT: CPT | Performed by: PHYSICIAN ASSISTANT

## 2025-05-20 PROCEDURE — 3078F DIAST BP <80 MM HG: CPT | Performed by: PHYSICIAN ASSISTANT

## 2025-05-20 PROCEDURE — G2211 COMPLEX E/M VISIT ADD ON: HCPCS | Performed by: PHYSICIAN ASSISTANT

## 2025-05-20 PROCEDURE — 80061 LIPID PANEL: CPT | Performed by: PHYSICIAN ASSISTANT

## 2025-05-20 RX ORDER — TRAZODONE HYDROCHLORIDE 50 MG/1
50-100 TABLET ORAL
Qty: 90 TABLET | Refills: 1 | Status: SHIPPED | OUTPATIENT
Start: 2025-05-20

## 2025-05-20 RX ORDER — BUDESONIDE AND FORMOTEROL FUMARATE DIHYDRATE 160; 4.5 UG/1; UG/1
1-2 AEROSOL RESPIRATORY (INHALATION) PRN
Qty: 10.2 G | Refills: 2 | Status: SHIPPED | OUTPATIENT
Start: 2025-05-20

## 2025-05-20 RX ORDER — ATORVASTATIN CALCIUM 20 MG/1
20 TABLET, FILM COATED ORAL DAILY
Qty: 90 TABLET | Refills: 3 | Status: SHIPPED | OUTPATIENT
Start: 2025-05-20

## 2025-05-20 RX ORDER — DEXTROAMPHETAMINE SACCHARATE, AMPHETAMINE ASPARTATE, DEXTROAMPHETAMINE SULFATE AND AMPHETAMINE SULFATE 5; 5; 5; 5 MG/1; MG/1; MG/1; MG/1
20 TABLET ORAL 2 TIMES DAILY
Qty: 60 TABLET | Refills: 0 | Status: SHIPPED | OUTPATIENT
Start: 2025-05-20

## 2025-05-20 RX ORDER — DEXTROAMPHETAMINE SACCHARATE, AMPHETAMINE ASPARTATE, DEXTROAMPHETAMINE SULFATE AND AMPHETAMINE SULFATE 5; 5; 5; 5 MG/1; MG/1; MG/1; MG/1
20 TABLET ORAL 2 TIMES DAILY
Qty: 60 TABLET | Refills: 0 | OUTPATIENT
Start: 2025-05-20

## 2025-05-20 RX ORDER — ASPIRIN 81 MG/1
81 TABLET ORAL DAILY
Qty: 90 TABLET | Refills: 3 | Status: SHIPPED | OUTPATIENT
Start: 2025-05-20

## 2025-05-20 ASSESSMENT — ASTHMA QUESTIONNAIRES
QUESTION_3 LAST FOUR WEEKS HOW OFTEN DID YOUR ASTHMA SYMPTOMS (WHEEZING, COUGHING, SHORTNESS OF BREATH, CHEST TIGHTNESS OR PAIN) WAKE YOU UP AT NIGHT OR EARLIER THAN USUAL IN THE MORNING: ONCE OR TWICE
QUESTION_2 LAST FOUR WEEKS HOW OFTEN HAVE YOU HAD SHORTNESS OF BREATH: ONCE OR TWICE A WEEK
QUESTION_4 LAST FOUR WEEKS HOW OFTEN HAVE YOU USED YOUR RESCUE INHALER OR NEBULIZER MEDICATION (SUCH AS ALBUTEROL): ONCE A WEEK OR LESS
ACT_TOTALSCORE: 20
QUESTION_1 LAST FOUR WEEKS HOW MUCH OF THE TIME DID YOUR ASTHMA KEEP YOU FROM GETTING AS MUCH DONE AT WORK, SCHOOL OR AT HOME: NONE OF THE TIME
QUESTION_5 LAST FOUR WEEKS HOW WOULD YOU RATE YOUR ASTHMA CONTROL: SOMEWHAT CONTROLLED

## 2025-05-20 ASSESSMENT — PATIENT HEALTH QUESTIONNAIRE - PHQ9
SUM OF ALL RESPONSES TO PHQ QUESTIONS 1-9: 21
10. IF YOU CHECKED OFF ANY PROBLEMS, HOW DIFFICULT HAVE THESE PROBLEMS MADE IT FOR YOU TO DO YOUR WORK, TAKE CARE OF THINGS AT HOME, OR GET ALONG WITH OTHER PEOPLE: SOMEWHAT DIFFICULT
SUM OF ALL RESPONSES TO PHQ QUESTIONS 1-9: 21

## 2025-05-20 ASSESSMENT — PAIN SCALES - GENERAL: PAINLEVEL_OUTOF10: NO PAIN (0)

## 2025-05-20 NOTE — PROGRESS NOTES
Assessment & Plan     Attention deficit disorder (ADD) without hyperactivity  Effective and tolerated  - amphetamine-dextroamphetamine (ADDERALL) 20 MG tablet; Take 1 tablet (20 mg) by mouth 2 times daily.    Atherosclerosis of abdominal aorta  Reviewed importance once again re taking statin/wto37ts especially with smoking hx and likely CAD. She will restart and re-enroll in the quit program  - aspirin 81 MG EC tablet; Take 1 tablet (81 mg) by mouth daily.  - atorvastatin (LIPITOR) 20 MG tablet; Take 1 tablet (20 mg) by mouth daily.  - Lipid panel reflex to direct LDL Fasting; Future  - Comprehensive metabolic panel (BMP + Alb, Alk Phos, ALT, AST, Total. Bili, TP); Future  - Lipid panel reflex to direct LDL Fasting  - Comprehensive metabolic panel (BMP + Alb, Alk Phos, ALT, AST, Total. Bili, TP)    Primary insomnia  Resending. Effective and tolerated. Consider melatonin to reset sleep/wake cycle  - traZODone (DESYREL) 50 MG tablet; Take 1-2 tablets ( mg) by mouth nightly as needed for sleep.    Encounter for screening mammogram for breast cancer  - MA Screen Bilateral w/Gonzalo; Future    Screening for colon cancer  - COLOGUARD([employer]); Future    Mild intermittent asthma without complication  Adding below to align with AIR therapy  - budesonide-formoterol (SYMBICORT/BREYNA) 160-4.5 MCG/ACT Inhaler; Inhale 1-2 puffs into the lungs as needed (for shortness of breath/symptoms. (max 12 puffs per day)).      The longitudinal plan of care for the diagnosis(es)/condition(s) as documented were addressed during this visit. Due to the added complexity in care, I will continue to support Joanne in the subsequent management and with ongoing continuity of care.      Nicotine/Tobacco Cessation  She reports that she has been smoking cigarettes. She has a 7.5 pack-year smoking history. She quit smokeless tobacco use about 3 years ago.  Nicotine/Tobacco Cessation Plan  She is enrolled in quit  "program      BMI  Estimated body mass index is 25.24 kg/m  as calculated from the following:    Height as of this encounter: 1.727 m (5' 8\").    Weight as of this encounter: 75.3 kg (166 lb).       Depression Screening Follow Up        5/20/2025     2:16 AM   PHQ   PHQ-9 Total Score 21    Q9: Thoughts of better off dead/self-harm past 2 weeks Not at all       Patient-reported         Subjective   Joanne is a 64 year old, presenting for the following health issues:  Recheck Medication      5/20/2025    10:29 AM   Additional Questions   Roomed by AA     History of Present Illness       Reason for visit:  Med check   She is taking medications regularly.        Joanne Padilla is a 64 year old female who presents today for medication check  She has been OFF trazodone, rosuvastatin and aspirin; stopped on her own but no specific reason  She would like to restart all of these   -did tolerate  Needs to re-enroll in tobacco quit program; she is planning to do so  Breathing overall controlled; spring and fall are the tougher times  Has albuterol at home     Review of Systems  Constitutional, HEENT, cardiovascular, pulmonary, gi and gu systems are negative, except as otherwise noted.      Objective    /76 (BP Location: Right arm, Patient Position: Sitting, Cuff Size: Adult Large)   Pulse 88   Temp 97.8  F (36.6  C)   Resp 16   Ht 1.727 m (5' 8\")   Wt 75.3 kg (166 lb)   LMP  (LMP Unknown)   SpO2 99%   BMI 25.24 kg/m    Body mass index is 25.24 kg/m .  Physical Exam   GENERAL: alert and no distress  EYES: Eyes grossly normal to inspection, PERRL and conjunctivae and sclerae normal  HENT: ear canals and TM's normal, nose and mouth without ulcers or lesions  RESP: lungs clear to auscultation - no rales, rhonchi or wheezes  CV: regular rates and rhythm  MS: No peripheral edema   PSYCH: mentation appears normal, affect normal/bright          Signed Electronically by: Fermín Larios PA-C    "

## 2025-05-21 ENCOUNTER — PATIENT OUTREACH (OUTPATIENT)
Dept: CARE COORDINATION | Facility: CLINIC | Age: 65
End: 2025-05-21
Payer: MEDICAID

## 2025-05-21 ENCOUNTER — RESULTS FOLLOW-UP (OUTPATIENT)
Dept: FAMILY MEDICINE | Facility: CLINIC | Age: 65
End: 2025-05-21

## 2025-05-21 DIAGNOSIS — E87.5 HYPERKALEMIA: Primary | ICD-10-CM

## 2025-05-21 LAB
ALBUMIN SERPL BCG-MCNC: 4.1 G/DL (ref 3.5–5.2)
ALP SERPL-CCNC: 82 U/L (ref 40–150)
ALT SERPL W P-5'-P-CCNC: 19 U/L (ref 0–50)
ANION GAP SERPL CALCULATED.3IONS-SCNC: 9 MMOL/L (ref 7–15)
AST SERPL W P-5'-P-CCNC: 25 U/L (ref 0–45)
BILIRUB SERPL-MCNC: 0.5 MG/DL
BUN SERPL-MCNC: 11.1 MG/DL (ref 8–23)
CALCIUM SERPL-MCNC: 9.6 MG/DL (ref 8.8–10.4)
CHLORIDE SERPL-SCNC: 105 MMOL/L (ref 98–107)
CHOLEST SERPL-MCNC: 261 MG/DL
CREAT SERPL-MCNC: 0.72 MG/DL (ref 0.51–0.95)
EGFRCR SERPLBLD CKD-EPI 2021: >90 ML/MIN/1.73M2
FASTING STATUS PATIENT QL REPORTED: YES
FASTING STATUS PATIENT QL REPORTED: YES
GLUCOSE SERPL-MCNC: 110 MG/DL (ref 70–99)
HCO3 SERPL-SCNC: 27 MMOL/L (ref 22–29)
HDLC SERPL-MCNC: 58 MG/DL
LDLC SERPL CALC-MCNC: 182 MG/DL
NONHDLC SERPL-MCNC: 203 MG/DL
POTASSIUM SERPL-SCNC: 5.5 MMOL/L (ref 3.4–5.3)
PROT SERPL-MCNC: 6.7 G/DL (ref 6.4–8.3)
SODIUM SERPL-SCNC: 141 MMOL/L (ref 135–145)
TRIGL SERPL-MCNC: 103 MG/DL

## 2025-05-29 ENCOUNTER — HOSPITAL ENCOUNTER (EMERGENCY)
Facility: CLINIC | Age: 65
Discharge: HOME | End: 2025-05-29
Admitting: EMERGENCY MEDICINE
Payer: MEDICAID

## 2025-05-29 VITALS
SYSTOLIC BLOOD PRESSURE: 123 MMHG | HEIGHT: 68 IN | HEART RATE: 89 BPM | TEMPERATURE: 97.2 F | RESPIRATION RATE: 20 BRPM | BODY MASS INDEX: 25.09 KG/M2 | OXYGEN SATURATION: 99 % | DIASTOLIC BLOOD PRESSURE: 84 MMHG | WEIGHT: 165.57 LBS

## 2025-05-29 PROCEDURE — 99281 EMR DPT VST MAYX REQ PHY/QHP: CPT

## 2025-05-29 ASSESSMENT — COLUMBIA-SUICIDE SEVERITY RATING SCALE - C-SSRS
6. HAVE YOU EVER DONE ANYTHING, STARTED TO DO ANYTHING, OR PREPARED TO DO ANYTHING TO END YOUR LIFE?: NO
1. IN THE PAST MONTH, HAVE YOU WISHED YOU WERE DEAD OR WISHED YOU COULD GO TO SLEEP AND NOT WAKE UP?: NO
2. HAVE YOU ACTUALLY HAD ANY THOUGHTS OF KILLING YOURSELF IN THE PAST MONTH?: NO

## 2025-05-30 NOTE — ED TRIAGE NOTES
Pt c/o headache for past month- states it hasn't gotten better. Pt states she has taken nothing for pain. VSS in triage.      Triage Assessment (Adult)       Row Name 05/29/25 2036          Triage Assessment    Airway WDL WDL        Respiratory WDL    Respiratory WDL WDL        Cardiac WDL    Cardiac WDL WDL        Cognitive/Neuro/Behavioral WDL    Cognitive/Neuro/Behavioral WDL WDL

## 2025-06-02 ENCOUNTER — TELEPHONE (OUTPATIENT)
Dept: FAMILY MEDICINE | Facility: CLINIC | Age: 65
End: 2025-06-02
Payer: MEDICAID

## 2025-06-02 NOTE — TELEPHONE ENCOUNTER
Patient calling due to headache. Denies completing a triage at this time.     Patient went to the ER last week (5/29/25) and left due to the wait. She reports she is not able to wait hours in an ER with the sound and lights. She asked if  gives IV medications or fluids. Reviewed that they do not and she would need to be seen in the ER. Patient asked what other options she has. Discussed that the [facility] in [place] may be able to help, but she would have to call them to see what they offer and wait times.     Patient verbalized understanding and denies questions.     Jory Foote RN   [facility]

## 2025-06-09 LAB — NONINV COLON CA DNA+OCC BLD SCRN STL QL: NEGATIVE

## 2025-06-14 ENCOUNTER — HEALTH MAINTENANCE LETTER (OUTPATIENT)
Age: 65
End: 2025-06-14

## 2025-06-19 DIAGNOSIS — F98.8 ATTENTION DEFICIT DISORDER (ADD) WITHOUT HYPERACTIVITY: ICD-10-CM

## 2025-06-19 RX ORDER — DEXTROAMPHETAMINE SACCHARATE, AMPHETAMINE ASPARTATE, DEXTROAMPHETAMINE SULFATE AND AMPHETAMINE SULFATE 5; 5; 5; 5 MG/1; MG/1; MG/1; MG/1
20 TABLET ORAL 2 TIMES DAILY
Qty: 60 TABLET | Refills: 0 | Status: SHIPPED | OUTPATIENT
Start: 2025-06-19

## 2025-07-24 ENCOUNTER — HOSPITAL ENCOUNTER (EMERGENCY)
Facility: CLINIC | Age: 65
Discharge: HOME | End: 2025-07-24
Attending: EMERGENCY MEDICINE | Admitting: EMERGENCY MEDICINE
Payer: MEDICAID

## 2025-07-24 VITALS
WEIGHT: 156.97 LBS | BODY MASS INDEX: 23.87 KG/M2 | HEART RATE: 68 BPM | SYSTOLIC BLOOD PRESSURE: 122 MMHG | OXYGEN SATURATION: 99 % | TEMPERATURE: 97.5 F | RESPIRATION RATE: 18 BRPM | DIASTOLIC BLOOD PRESSURE: 84 MMHG

## 2025-07-24 DIAGNOSIS — R51.9 ACUTE NONINTRACTABLE HEADACHE, UNSPECIFIED HEADACHE TYPE: Primary | ICD-10-CM

## 2025-07-24 PROCEDURE — 99284 EMERGENCY DEPT VISIT MOD MDM: CPT | Mod: 25 | Performed by: EMERGENCY MEDICINE

## 2025-07-24 PROCEDURE — 96374 THER/PROPH/DIAG INJ IV PUSH: CPT

## 2025-07-24 PROCEDURE — 250N000011 HC RX IP 250 OP 636: Performed by: EMERGENCY MEDICINE

## 2025-07-24 PROCEDURE — 96375 TX/PRO/DX INJ NEW DRUG ADDON: CPT

## 2025-07-24 RX ORDER — KETOROLAC TROMETHAMINE 15 MG/ML
15 INJECTION, SOLUTION INTRAMUSCULAR; INTRAVENOUS ONCE
Status: COMPLETED | OUTPATIENT
Start: 2025-07-24 | End: 2025-07-24

## 2025-07-24 RX ORDER — DEXAMETHASONE SODIUM PHOSPHATE 10 MG/ML
10 INJECTION, SOLUTION INTRAMUSCULAR; INTRAVENOUS ONCE
Status: COMPLETED | OUTPATIENT
Start: 2025-07-24 | End: 2025-07-24

## 2025-07-24 RX ORDER — METOCLOPRAMIDE HYDROCHLORIDE 5 MG/ML
10 INJECTION INTRAMUSCULAR; INTRAVENOUS ONCE
Status: COMPLETED | OUTPATIENT
Start: 2025-07-24 | End: 2025-07-24

## 2025-07-24 RX ORDER — DIPHENHYDRAMINE HYDROCHLORIDE 50 MG/ML
25 INJECTION, SOLUTION INTRAMUSCULAR; INTRAVENOUS ONCE
Status: COMPLETED | OUTPATIENT
Start: 2025-07-24 | End: 2025-07-24

## 2025-07-24 RX ADMIN — METOCLOPRAMIDE HYDROCHLORIDE 10 MG: 5 INJECTION INTRAMUSCULAR; INTRAVENOUS at 14:34

## 2025-07-24 RX ADMIN — KETOROLAC TROMETHAMINE 15 MG: 15 INJECTION, SOLUTION INTRAMUSCULAR; INTRAVENOUS at 14:35

## 2025-07-24 RX ADMIN — DIPHENHYDRAMINE HYDROCHLORIDE 25 MG: 50 INJECTION, SOLUTION INTRAMUSCULAR; INTRAVENOUS at 14:34

## 2025-07-24 RX ADMIN — DEXAMETHASONE SODIUM PHOSPHATE 10 MG: 10 INJECTION, SOLUTION INTRAMUSCULAR; INTRAVENOUS at 14:34

## 2025-07-24 ASSESSMENT — COLUMBIA-SUICIDE SEVERITY RATING SCALE - C-SSRS
1. IN THE PAST MONTH, HAVE YOU WISHED YOU WERE DEAD OR WISHED YOU COULD GO TO SLEEP AND NOT WAKE UP?: NO
2. HAVE YOU ACTUALLY HAD ANY THOUGHTS OF KILLING YOURSELF IN THE PAST MONTH?: NO
6. HAVE YOU EVER DONE ANYTHING, STARTED TO DO ANYTHING, OR PREPARED TO DO ANYTHING TO END YOUR LIFE?: NO

## 2025-07-24 ASSESSMENT — ACTIVITIES OF DAILY LIVING (ADL)
ADLS_ACUITY_SCORE: 41
ADLS_ACUITY_SCORE: 41

## 2025-07-24 NOTE — ED TRIAGE NOTES
Arrives ambulatory from the community. States she developed a migraine this morning.   States did not attempt any medications prior to arrival.

## 2025-07-24 NOTE — DISCHARGE INSTRUCTIONS
It was a pleasure taking care of you today. I hope you feel much better soon.  Your evaluation was reassuring.  Please follow-up with your primary care doctor in 5-7 days. Please follow up with neurology as planned.  Return immediately for worse pain, fever, or any other concerns.

## 2025-07-24 NOTE — ED PROVIDER NOTES
History     Chief Complaint:  Headache       HPI     Joanne Padilla is a 65-year-old female with a history of migraines since age 15, who presents with severe headache, photophobia, and nausea. Three and a half years ago, she fell on ice and snow, landing on her hands and knees, resulting in a concussion. Initial evaluation at urgent care did not diagnose concussion; diagnosis was made approximately one year later. She began treatments for vestibular symptoms and occupational therapy after diagnosis, with significant improvement following therapy. Since the concussion, she has experienced occasional headaches that sometimes spike in severity, with a recent increase in frequency of flare-ups. The current headache is described as severe, radiating around the head, associated with light sensitivity and nausea but without vomiting. She reports this headache today is typical of these kinds of headaches.   She denies recent injury, dizziness, fever, vision changes, or numbness/tingling.      Independent Historian:   None    Review of External Notes:   Reviewed outside health system ED visit from 6/2/2025 for headache    ROS:  Review of Systems    Allergies:  Codeine  Dust Mites  Latex     Medications:    albuterol (PROAIR HFA/PROVENTIL HFA/VENTOLIN HFA) 108 (90 Base) MCG/ACT inhaler  amphetamine-dextroamphetamine (ADDERALL) 20 MG tablet  aspirin 81 MG EC tablet  atorvastatin (LIPITOR) 20 MG tablet  budesonide-formoterol (SYMBICORT/BREYNA) 160-4.5 MCG/ACT Inhaler  traZODone (DESYREL) 50 MG tablet        Past History:    Past Medical History:   Diagnosis Date    Allergic rhinitis, cause unspecified     Anxiety state, unspecified     Atopic dermatitis 10/10/2011    Cardiac dysrhythmia, unspecified     Chronic rhinitis 10/10/2011    Colitis     Diabetes (H) 2003    Exhaustion due to excessive exertion(994.5)     Gallstones     Hypercholesterolemia     Insomnia, unspecified     Major depressive disorder, single episode,  unspecified     Migraines 12/17/2012    Osteoarthritis of CMC joint of thumb     Other acne     Other malaise and fatigue     Palpitations     PONV (postoperative nausea and vomiting)     Uncomplicated asthma          Physical Exam     Patient Vitals for the past 24 hrs:  Vitals:    07/24/25 1307 07/24/25 1525   BP: (!) 121/96 122/84   Pulse: 83 68   Resp: 18 18   Temp: 97.5  F (36.4  C)    TempSrc: Temporal    SpO2: 98% 99%   Weight: 71.2 kg (156 lb 15.5 oz)          Physical Exam  Constitutional: Alert, attentive  HENT:    Nose: Nose normal.    Mouth/Throat: Oropharynx is clear, mucous membranes are moist  Eyes: EOM are normal. Pupils are equal, round, and reactive to light.   CV: Regular rate and rhythm, no murmurs, rubs or gallops.  Chest: Effort normal and breath sounds normal.   GI: No distension. There is no tenderness  MSK: Normal range of motion.   Neurological:   A/Ox3;   Cranial nerves 2-12 intact;   5/5 strength throughout the upper and lower extremities;   sensation intact to light touch throughout the upper and lower extremities;   1+ DTRs to the bilateral upper and lower extremities (biceps, BRs, patellar, achilles);   normal gait   No meningismus   Skin: Skin is warm and dry.      Emergency Department Course     Emergency Department Course & Assessments:             Interventions:  Medications   diphenhydrAMINE (BENADRYL) injection 25 mg (25 mg Intravenous $Given 7/24/25 1434)   metoclopramide (REGLAN) injection 10 mg (10 mg Intravenous $Given 7/24/25 1434)   ketorolac (TORADOL) injection 15 mg (15 mg Intravenous $Given 7/24/25 1435)   dexAMETHasone PF (DECADRON) injection 10 mg (10 mg Intravenous $Given 7/24/25 1434)          Disposition:  The patient was discharged to home.     Impression & Plan      Medical Decision Making:  This is a 65-year-old female with history of migraine headaches as described above who presents for further evaluation of the same.  She describes her typical headache today  without clear precipitant.  She has no fever, dizziness, stroke symptoms, or other red flag characteristics.  She has a normal neurologic exam.No thunderclap or worst at onset nor fever to indicate LP.  No indication for brain imaging at this time.  Symptoms are significant improved on recheck.  Plan neurology follow-up for further discussion of possible medication management and return precautions for worsened pain, stroke symptoms, or any other concerns.        Diagnosis:  Visit Diagnosis, Associated Orders, and Comments     ICD-10-CM    1. Acute nonintractable headache, unspecified headache type  R51.9                Efren Ireland MD  07/24/25 1554

## 2025-07-28 DIAGNOSIS — F98.8 ATTENTION DEFICIT DISORDER (ADD) WITHOUT HYPERACTIVITY: ICD-10-CM

## 2025-07-28 RX ORDER — DEXTROAMPHETAMINE SACCHARATE, AMPHETAMINE ASPARTATE, DEXTROAMPHETAMINE SULFATE AND AMPHETAMINE SULFATE 5; 5; 5; 5 MG/1; MG/1; MG/1; MG/1
20 TABLET ORAL 2 TIMES DAILY
Qty: 60 TABLET | Refills: 0 | Status: SHIPPED | OUTPATIENT
Start: 2025-07-28 | End: 2025-08-27

## 2025-07-28 NOTE — TELEPHONE ENCOUNTER
RN received call from patient. Patient is requesting script for amphetamine-dextroamphetamine (ADDERALL) 20 MG tablet be sent to [facility] in [place] on [address]. Patient states she has a mix-up on her insurance. The county said they would pay for her rx, but they will only pay for it through [facility] pharmacy. Patient states once her insurance gets straightened out, she would like her script to go back to being paid through [facility] pharmacy (after this refill).      Jose IGNACIO RN 7/28/2025 at 4:23 PM

## 2025-08-01 ENCOUNTER — TELEPHONE (OUTPATIENT)
Dept: FAMILY MEDICINE | Facility: CLINIC | Age: 65
End: 2025-08-01

## 2025-08-04 ENCOUNTER — TELEPHONE (OUTPATIENT)
Dept: NEUROLOGY | Facility: CLINIC | Age: 65
End: 2025-08-04
Payer: MEDICAID

## 2025-08-04 ENCOUNTER — TELEPHONE (OUTPATIENT)
Dept: PHYSICAL MEDICINE AND REHAB | Facility: CLINIC | Age: 65
End: 2025-08-04
Payer: MEDICAID

## 2025-08-22 ENCOUNTER — OFFICE VISIT (OUTPATIENT)
Dept: PHYSICAL MEDICINE AND REHAB | Facility: CLINIC | Age: 65
End: 2025-08-22
Payer: MEDICAID

## 2025-08-22 VITALS — HEART RATE: 65 BPM | SYSTOLIC BLOOD PRESSURE: 106 MMHG | DIASTOLIC BLOOD PRESSURE: 72 MMHG

## 2025-08-22 DIAGNOSIS — F07.81 POST CONCUSSION SYNDROME: Primary | ICD-10-CM

## 2025-08-22 DIAGNOSIS — G24.3 CERVICAL DYSTONIA: ICD-10-CM

## 2025-08-22 PROCEDURE — 99215 OFFICE O/P EST HI 40 MIN: CPT | Performed by: PHYSICAL MEDICINE & REHABILITATION

## 2025-08-22 PROCEDURE — 3078F DIAST BP <80 MM HG: CPT | Performed by: PHYSICAL MEDICINE & REHABILITATION

## 2025-08-22 PROCEDURE — 3074F SYST BP LT 130 MM HG: CPT | Performed by: PHYSICAL MEDICINE & REHABILITATION

## 2025-08-25 ENCOUNTER — PATIENT OUTREACH (OUTPATIENT)
Dept: CARE COORDINATION | Facility: CLINIC | Age: 65
End: 2025-08-25
Payer: MEDICAID

## 2025-08-25 DIAGNOSIS — F98.8 ATTENTION DEFICIT DISORDER (ADD) WITHOUT HYPERACTIVITY: ICD-10-CM

## 2025-08-26 RX ORDER — DEXTROAMPHETAMINE SACCHARATE, AMPHETAMINE ASPARTATE, DEXTROAMPHETAMINE SULFATE AND AMPHETAMINE SULFATE 5; 5; 5; 5 MG/1; MG/1; MG/1; MG/1
20 TABLET ORAL 2 TIMES DAILY
Qty: 60 TABLET | Refills: 0 | OUTPATIENT
Start: 2025-08-26

## 2025-08-26 RX ORDER — DEXTROAMPHETAMINE SACCHARATE, AMPHETAMINE ASPARTATE, DEXTROAMPHETAMINE SULFATE AND AMPHETAMINE SULFATE 5; 5; 5; 5 MG/1; MG/1; MG/1; MG/1
20 TABLET ORAL 2 TIMES DAILY
Qty: 60 TABLET | Refills: 0 | Status: SHIPPED | OUTPATIENT
Start: 2025-08-26 | End: 2025-09-25

## 2025-08-26 RX ORDER — DEXTROAMPHETAMINE SACCHARATE, AMPHETAMINE ASPARTATE, DEXTROAMPHETAMINE SULFATE AND AMPHETAMINE SULFATE 5; 5; 5; 5 MG/1; MG/1; MG/1; MG/1
20 TABLET ORAL 2 TIMES DAILY
Qty: 60 TABLET | Refills: 0 | Status: SHIPPED | OUTPATIENT
Start: 2025-10-25 | End: 2025-11-24

## 2025-08-26 RX ORDER — DEXTROAMPHETAMINE SACCHARATE, AMPHETAMINE ASPARTATE, DEXTROAMPHETAMINE SULFATE AND AMPHETAMINE SULFATE 5; 5; 5; 5 MG/1; MG/1; MG/1; MG/1
20 TABLET ORAL 2 TIMES DAILY
Qty: 60 TABLET | Refills: 0 | Status: SHIPPED | OUTPATIENT
Start: 2025-09-25 | End: 2025-10-25